# Patient Record
Sex: FEMALE | Race: BLACK OR AFRICAN AMERICAN | Employment: OTHER | URBAN - METROPOLITAN AREA
[De-identification: names, ages, dates, MRNs, and addresses within clinical notes are randomized per-mention and may not be internally consistent; named-entity substitution may affect disease eponyms.]

---

## 2017-02-14 ENCOUNTER — OFFICE VISIT (OUTPATIENT)
Dept: RHEUMATOLOGY | Age: 77
End: 2017-02-14

## 2017-02-14 VITALS
WEIGHT: 157.2 LBS | SYSTOLIC BLOOD PRESSURE: 141 MMHG | DIASTOLIC BLOOD PRESSURE: 88 MMHG | OXYGEN SATURATION: 98 % | HEIGHT: 63 IN | HEART RATE: 69 BPM | TEMPERATURE: 97.7 F | BODY MASS INDEX: 27.85 KG/M2

## 2017-02-14 DIAGNOSIS — I31.39 PERICARDIAL EFFUSION: ICD-10-CM

## 2017-02-14 DIAGNOSIS — Z79.60 LONG-TERM USE OF IMMUNOSUPPRESSANT MEDICATION: ICD-10-CM

## 2017-02-14 DIAGNOSIS — R91.8 PULMONARY NODULES: ICD-10-CM

## 2017-02-14 DIAGNOSIS — M05.9 SEROPOSITIVE RHEUMATOID ARTHRITIS (HCC): Primary | Chronic | ICD-10-CM

## 2017-02-14 DIAGNOSIS — M70.62 TROCHANTERIC BURSITIS OF BOTH HIPS: ICD-10-CM

## 2017-02-14 DIAGNOSIS — M85.80 OSTEOPENIA: ICD-10-CM

## 2017-02-14 DIAGNOSIS — M70.61 TROCHANTERIC BURSITIS OF BOTH HIPS: ICD-10-CM

## 2017-02-14 DIAGNOSIS — R91.1 LUNG NODULE: ICD-10-CM

## 2017-02-14 DIAGNOSIS — M17.0 PRIMARY OSTEOARTHRITIS OF BOTH KNEES: ICD-10-CM

## 2017-02-14 RX ORDER — TRIAMCINOLONE ACETONIDE 40 MG/ML
40 INJECTION, SUSPENSION INTRA-ARTICULAR; INTRAMUSCULAR ONCE
Qty: 1 ML | Refills: 0
Start: 2017-02-14 | End: 2017-02-14

## 2017-02-14 RX ORDER — METHOTREXATE 2.5 MG/1
12.5 TABLET ORAL
Qty: 60 TAB | Refills: 0 | Status: SHIPPED | OUTPATIENT
Start: 2017-02-19 | End: 2017-06-01 | Stop reason: SDUPTHER

## 2017-02-14 RX ORDER — LIDOCAINE HYDROCHLORIDE 10 MG/ML
1 INJECTION, SOLUTION EPIDURAL; INFILTRATION; INTRACAUDAL; PERINEURAL ONCE
Qty: 1 ML | Refills: 0
Start: 2017-02-14 | End: 2017-02-14

## 2017-02-14 NOTE — PATIENT INSTRUCTIONS
Labs today    I injected both your bursas    Please increase your methotrexate to 5 tablets every Sunday.

## 2017-02-14 NOTE — PROGRESS NOTES
REASON FOR VISIT    This is a follow-up visit for Ms. Verlin Koyanagi for Seropositive Rheumatoid Arthritis and Osteopenia. Inflammatory arthritis phenotype includes:  Anti-CCP positive: no  Rheumatoid factor positive: yes (low titer 19.3)  Erosive disease: no  Extra-articular manifestations include: pericardial effusion, pulmonary nodules and GGO    Quantiferon TB: negative  PPD:  Not performed    Therapy History includes:    Current DMARD therapy include: methotrexate 10 mg every Sunday  Prior DMARD therapy include: none  Discontinued DMARDs because of inefficacy: None  Discontinued DMARDs because of side effects: None    Immunizations:   Immunization History   Administered Date(s) Administered    Influenza High Dose Vaccine PF 10/10/2016    Pneumococcal Polysaccharide (PPSV-23) 10/10/2016    Pneumococcal Vaccine (Pcv) 10/31/2007    Tdap 04/21/2014       Active problems include:    Patient Active Problem List   Diagnosis Code    DJD (degenerative joint disease) M19.90    Eczema L30.9    Cyst     Osteopenia M85.80    Hx of mammogram Z92.89    Glucose intolerance (impaired glucose tolerance) R73.02    Lung nodule R91.1    Pericardial effusion I31.3    Seropositive rheumatoid arthritis (HCC) M05.9    Primary osteoarthritis of both hands M19.041, M19.042    Primary osteoarthritis of both knees M17.0    Trochanteric bursitis of both hips M70.61, M70.62    Chronic midline low back pain with right-sided sciatica M54.41, G89.29    Pulmonary nodules R91.8    Abnormal CT scan, chest R93.8    Long-term use of immunosuppressant medication Z79.899       HISTORY OF PRESENT ILLNESS    Ms. Verlin Koyanagi returns for a follow-up visit. On her last visit, I continued methotrexate 10 mg every Sunday and she is tolerating it well. I also injected her knees with good relief. She stumbled and fell since her last visit trying to fix chair. She has has bilateral trochanter pain.  She has morning stiffness in her trochanteric for 45 minutes. She denies pain, swelling, or stiffness in her hands. She has been doing exercise three times per week (MWF) which has helped her feel better. Last blood work was done on 11/16/2016 and did not reveal any significant adverse effects, except for creatinine 0.91 mg/dL, eGFR 72 (previously 1.04 mg/dL, eGFR 62). Most recent inflammatory markers from 11/16/2016 revealed a normal ESR 4 mm/hr (previously 3, 10 mm/hr) and CRP 0.8 mg/L (previously 1.3, 0.7 mg/dL). The patient has not had any interval hospital admissions, infections, or surgeries. REVIEW OF SYSTEMS    A comprehensive review of systems was performed and pertinent results are documented in the HPI, review of systems is otherwise non-contributory. PAST MEDICAL HISTORY    She has a past medical history of Carpal tunnel syndrome; DJD (degenerative joint disease); DJD (degenerative joint disease) of knees (4/9/2013); Eczema; GERD (gastroesophageal reflux disease); Neuropathy; Osteoarthritis; Osteopenia; and Post-menopausal. She also has no past medical history of Difficult intubation; Essential hypertension; Nausea & vomiting; or Unspecified adverse effect of anesthesia. FAMILY HISTORY    Her family history includes Arthritis-osteo in her brother, father, sister, and sister; Elevated Lipids in her sister; Heart Disease (age of onset: 72) in her mother; Hypertension in her brother, maternal aunt, and maternal uncle; Stroke in her maternal aunt and maternal uncle; Thyroid Disease in her sister. SOCIAL HISTORY    She reports that she quit smoking about 2 years ago. Her smoking use included Cigarettes. She smoked 0.10 packs per day. She has never used smokeless tobacco. She reports that she does not drink alcohol or use illicit drugs.     MEDICATIONS    Current Outpatient Prescriptions   Medication Sig Dispense Refill    triamcinolone acetonide (KENALOG) 40 mg/mL injection 1 mL by IntraBURSal route once for 1 dose. 1 mL 0    lidocaine, PF, (XYLOCAINE) 10 mg/mL (1 %) injection 1 mL by IntraBURSal route once for 1 dose. 1 mL 0    triamcinolone acetonide (KENALOG) 40 mg/mL injection 1 mL by IntraBURSal route once for 1 dose. 1 mL 0    lidocaine, PF, (XYLOCAINE) 10 mg/mL (1 %) injection 1 mL by IntraBURSal route once for 1 dose. 1 mL 0    [START ON 2/19/2017] methotrexate (RHEUMATREX) 2.5 mg tablet Take 5 Tabs by mouth every Sunday for 90 days. 60 Tab 0    folic acid (FOLVITE) 1 mg tablet Take  by mouth daily.  ibuprofen (MOTRIN) 400 mg tablet Take  by mouth every six (6) hours as needed for Pain.      B.infantis-B.ani-B.long-B.bifi (PROBIOTIC 4X) 10-15 mg TbEC Take  by mouth.  vitamin E (AQUA GEMS) 400 unit capsule Take  by mouth daily.  methylsulfonylmethane (MSM) 1,000 mg tab Take  by mouth. Indications: Joint Health      OTHER Indications: Calcium and Vitamin D3 1200 mg total      ascorbic acid, vitamin C, (VITAMIN C) 500 mg tablet Take  by mouth. Takes 1 tab daily. ALLERGIES    No Known Allergies    PHYSICAL EXAMINATION    Visit Vitals    /88 (BP 1 Location: Left arm, BP Patient Position: Sitting)    Pulse 69    Temp 97.7 °F (36.5 °C) (Oral)    Ht 5' 3\" (1.6 m)    Wt 157 lb 3.2 oz (71.3 kg)    SpO2 98%    BMI 27.85 kg/m2     Body mass index is 27.85 kg/(m^2). General: Patient is alert, oriented x 3, not in acute distress    HEENT:   Sclerae are not injected and appear moist.  Oral mucous membranes are moist, there are no ulcers present. There is no alopecia. Neck is supple, there is no lymphadenopathy. Cardiovascular:  Heart is regular rate and rhythm, no murmurs. Chest:  Lungs are clear to auscultation bilaterally. No rhonchi, wheezes, or crackles. Abdomen:  Soft, non-tender.     Extremities:  Free of clubbing, cyanosis, edema    Neurological exam:  No focal sensory deficits, muscle strength is full in upper and lower extremities     Skin exam:  There are no rashes, no alopecia, no discoid lesions, no active Raynaud's, no livedo reticularis, no periungual erythema. Musculoskeletal exam:  A comprehensive musculoskeletal exam was performed for all joints of each upper and lower extremity and assessed for swelling, tenderness and range of motion.  Positive results are documented as below:     Bilateral Michael and Heberden nodes  Bilateral knee crepitus without effusion   Bilateral hallux valgus  Hammer toes  Bilateral trochanteric buristis    Joint Count 2/14/2017 11/16/2016 8/12/2016 6/15/2016 5/27/2016   Patient pain (0-100) 0 0 5 10 10   MHAQ 0 0 0 0 0   Left elbow - Tender - - 1 - -   Left elbow - Swollen - - 1 - -   Left wrist- Tender 0 - - - 1   Left wrist- Swollen 1 1 1 1 1   Left 1st MCP - Tender - - - 1 -   Left 1st MCP - Swollen - - 1 1 -   Left 2nd MCP - Tender - - - 1 1   Left 2nd MCP - Swollen - - 1 1 1   Left 3rd MCP - Tender - - - - 1   Left 3rd MCP - Swollen - - - 1 1   Left 5th MCP - Swollen - 1 - - -   Right elbow - Tender - - 1 - -   Right elbow - Swollen - - 1 - -   Right wrist- Tender - - - 1 1   Right wrist- Swollen 1 1 1 1 1   Right 1st MCP - Tender - - 1 1 -   Right 1st MCP - Swollen 1 - 1 1 -   Right 2nd MCP - Tender - - - - 1   Right 2nd MCP - Swollen - 1 - 1 1   Right 3rd MCP - Tender - - 1 - 1   Right 3rd MCP - Swollen - - 1 1 1   Right 5th MCP - Swollen 1 1 - - -   Right 2nd PIP - Tender - - - - 1   Right 2nd PIP - Swollen 1 - - - -   Right 3rd PIP - Tender - - - - 1   Right 4th PIP - Tender - - - - 1   Right 5th PIP - Tender - - - - 1   Tender Joint Count (Total) 0 - - - -   Swollen Joint Count (Total) 5 - - - -   Physician Assessment (0-10) 2 2 2 20 20   Patient Assessment (0-10) 0.5 1 2 25 10   CDAI Total (calculated) 7.5 - - - -     DATA REVIEW    Laboratory     The following laboratory results were reviewed and discussed with the patient:    Office Visit on 11/16/2016   Component Date Value    Color (UA POC) 11/16/2016 Yellow     Clarity (UA POC) 11/16/2016 Clear     Glucose (UA POC) 11/16/2016 Negative     Bilirubin (UA POC) 11/16/2016 Negative     Ketones (UA POC) 11/16/2016 Negative     Specific gravity (UA POC) 11/16/2016 1.025     Blood (UA POC) 11/16/2016 Trace     pH (UA POC) 11/16/2016 6.5     Protein (UA POC) 11/16/2016 Negative     Urobilinogen (UA POC) 11/16/2016 0.2 mg/dL     Nitrites (UA POC) 11/16/2016 Negative     Leukocyte esterase (UA P* 11/16/2016 Negative    Office Visit on 11/16/2016   Component Date Value    WBC 11/16/2016 7.3     RBC 11/16/2016 4.57     HGB 11/16/2016 14.3     HCT 11/16/2016 44.5     MCV 11/16/2016 97     MCH 11/16/2016 31.3     MCHC 11/16/2016 32.1     RDW 11/16/2016 14.7     PLATELET 46/46/7792 868     NEUTROPHILS 11/16/2016 65     Lymphocytes 11/16/2016 24     MONOCYTES 11/16/2016 8     EOSINOPHILS 11/16/2016 2     BASOPHILS 11/16/2016 1     ABS. NEUTROPHILS 11/16/2016 4.7     Abs Lymphocytes 11/16/2016 1.7     ABS. MONOCYTES 11/16/2016 0.6     ABS. EOSINOPHILS 11/16/2016 0.2     ABS. BASOPHILS 11/16/2016 0.0     IMMATURE GRANULOCYTES 11/16/2016 0     ABS. IMM. GRANS. 11/16/2016 0.0     Glucose 11/16/2016 79     BUN 11/16/2016 10     Creatinine 11/16/2016 0.91     GFR est non-AA 11/16/2016 62     GFR est AA 11/16/2016 72     BUN/Creatinine ratio 11/16/2016 11     Sodium 11/16/2016 143     Potassium 11/16/2016 5.0     Chloride 11/16/2016 103     CO2 11/16/2016 27     Calcium 11/16/2016 9.5     Protein, total 11/16/2016 6.6     Albumin 11/16/2016 3.9     GLOBULIN, TOTAL 11/16/2016 2.7     A-G Ratio 11/16/2016 1.4     Bilirubin, total 11/16/2016 0.6     Alk.  phosphatase 11/16/2016 65     AST (SGOT) 11/16/2016 21     ALT (SGPT) 11/16/2016 13     Sed rate (ESR) 11/16/2016 4     C-Reactive Protein, Qt 11/16/2016 0.8      Imaging    Musculoskeletal Ultrasound    Ultrasound of the right wrist. Indication: joint swelling. (5/27/2016)  Using a [employer] with 18 Mhz probe, limited views of the right wrist were obtained. This revealed hypoechoic collection without doppler within the midcarpal joint space. The tendons were normal. Bony contours were regular without erosions seen. There were no soft tissue masses noted. Impression: synovial hypertrophy    Radiographs    Bilateral Hand 5/27/2016: Moderate bilateral triscaphe joint osteoarthritis. Moderate left and mild right first MC joint osteoarthritis. Moderate bilateral first MCP joint osteoarthritis. Multifocal IP joint osteoarthritis is bilateral. There are central erosions in the right first through fourth DIP joints and left third and fifth MP joints. Subtle marginal erosions in the left second DIP joint. No fracture or dislocation on plain film. Alignment is within normal limits. Bone mineralization is decreased. No soft tissue calcification. Bilateral Shoulder 5/27/2016: LEFT: demonstrate mild osteopenia. No acute fracture or dislocation. Age-appropriate AC joint osteoarthritis with marginal osteophytes. Glenohumeral joint is within normal limits for age. No evidence of erosion. RIGHT: demonstrate osteopenia. No acute fracture or dislocation. AC joint osteoarthritis includes marginal osteophytes. Glenohumeral joint osteoarthritis is age-appropriate. Cortical irregularity of the greater tuberosity of the humerus indicates underlying rotator cuff pathology    Left Knee 12/29/2015: no fracture. Joint effusion and prepatellar soft tissue contusion versus bursitis. Increased osteoarthritis. Lumbar Spine 12/29/2015: stable dextroconvex scoliosis without evidence of acute fracture or subluxation. Significant multilevel degenerative disc disease is again noted from L2-S1. The patient is status post laminectomy from L1 through to the sacrum. Multilevel facet degenerative arthritis again noted.     Left Knee 8/03/2010: sclerosis and deformity of the lateral tibial plateau with associated lateral compartment osteoarthritis is suggestive of old injury. No acute fracture is seen. There is moderate to severe patellofemoral     CT Imaging    CT Chest without contrast 7/22/2016: Small lingular and right middle lobe nodules and left lower lobe groundglass opacity all unchanged. This represents a 16 month follow-up examination. The small middle lobe nodule and groundglass opacity in the left lower lobe are essentially unchanged in comparison with 3/9/2011. CT Chest with contrast 1/27/2016: nodules in both lungs including a soft more groundglass appearance nodule in the left lower lobe are stable when compared to the previous examination. No new acute finding or new nodule. Small pericardial effusion. Hiatal hernia. CT Chest without contrast 6/15/2015: stable semisolid nodule anteriorly in left lower lobe. The other nodules are stable. Decreasing pericardial effusion. Stable adenopathy. She underwent a fine needle biopsy which showed abundant benign and reactive bronchial epithelial cells and no malignancy. CT Chest without contrast 5/13/2015: a suspicious semisolid nodule in the anterior segment of the left lower lobe just posterior to the fissure with some retraction of the fissure. This measures 1.2 x 1 x 1.4 cm. PET/CT negative for an 4/10/2015. Moderate stable pericardial effusion. Stable enlarged lymph nodes as described. Several other small pulmonary nodules also stable. PET/CT Scan 4/10/2015: showed the vague semisolid nodule in the left lower lobe demonstrates no increased metabolic activity on PET suggesting benign etiology but is nonspecific and neoplasm is not entirely excluded. There are 2 lymph nodes in the right neck which are normal in size and demonstrate slight increased metabolic activity nonspecific most likely reactive. CT Heart without contrast with calcium 3/12/2015: total calcium score of 0.  A low score suggests a low likelihood of coronary disease but does not exclude the possibility of significant coronary artery narrowing. 3 mm right middle lobe lung nodule unchanged. Pericardial effusion. Hiatal hernia. A CT chest without contrast showed a suspicious semisolid nodule in the anterior segment of the left lower lobe just posterior to the fissure with some retraction of the fissure. This measures 1.2 x 1 x 1.4 cm. This could represent a small bronchogenic carcinoma. Moderate pericardial effusion clinical correlation is recommended. 2 enlarged lymph nodes as described. Several other small pulmonary nodules can be evaluated on followup 23X    CT Heart without contrast with calcium 3/09/2011: total calcium score of 0. A low score suggests a low likelihood of coronary disease but does not exclude the possibility of significant coronary artery narrowing. Small pericardial effusion. 3 mm pulmonary nodule right middle lobe. 7 mm density left lower lobe. MR Imaging    None    DXA    DXA 5/21/2015: lumbar spine L1-L4 T score 1.7 (BMD 1.389 g/cm2), left femoral neck T score: -2.2 (0.728 g/cm2), right femoral neck  T score: -1.4 (0.825 g/cm2), total hip T score: -2.2 (0.728 g/cm2). Echocardiogram    Echocardiogram 6/03/2015: LEFT VENTRICLE: Size was normal. Systolic function was normal. Ejection fraction was estimated in the range of 60 % to 65 %. There were no regional wall motion abnormalities. Wall thickness was mildly increased. DOPPLER: Doppler parameters were consistent with abnormal left ventricular relaxation (grade 1 diastolic dysfunction). RIGHT VENTRICLE: The size was normal. Systolic function was normal. LEFT ATRIUM: Size was normal. LA volume index was 23 ml/m squared. ATRIAL SEPTUM: There was a small atrial septal aneurysm. There was no evidence of shunt on color flow imaging. RIGHT ATRIUM: Size was normal. MITRAL VALVE: Normal valve structure. DOPPLER: There was trivial regurgitation. AORTIC VALVE: Normal valve structure. DOPPLER: Transaortic velocity was within the normal range.  There was no stenosis. There was no significant regurgitation. TRICUSPID VALVE: Normal valve structure. DOPPLER: There was mild regurgitation. Right atrial pressure estimate: 5 mmHg. Pulmonary artery systolic pressure: 25 mmHg. There was no pulmonary hypertension. PULMONIC VALVE: Not well visualized. DOPPLER: The transpulmonic velocity was within the normal range. There was trivial regurgitation. AORTA: The root exhibited normal size. SYSTEMIC VEINS: IVC: The inferior vena cava was normal in size. PERICARDIUM: A small pericardial effusion was identified along the right atrial free wall. There was no evidence of hemodynamic compromise. PATHOLOGY    Fine Need Aspirate of LLL 6/15/2015: Abundant benign and reactive bronchial epithelial cells. No cells diagnostic for malignancy    PROCEDURE     Bilateral Knee Kenalog 40 mg IA (November 16, 2016)      Indications:   Symptom relief from bilateral trochanteric bursitis. (02/14/17)     Procedure:   After consent was obtained, using sterile technique the left trochanteric bursa was prepped using alcohol. Local anesthetic used: Ethyl Chloride. The gluteus was entered and 0 ml's of fluid was withdrawn. Kenalog 40 mg was mixed with 1% lidocaine 1 ml and injected into the trochanteric bursa with a spinal needle withdrawn. The procedure was well tolerated. After consent was obtained, using sterile technique the right trochanteric bursa was prepped using alcohol. Local anesthetic used: Ethyl Chloride. The gluteus was entered and 0 ml's of fluid was withdrawn. Kenalog 40 mg was mixed with 1% lidocaine 1 ml and injected into the trochanteric bursa with a spinal needle withdrawn. The procedure was well tolerated. The patient was asked to continue to rest the bursa for a few more days before resuming regular activities. It may be more painful for the first 1-2 days. Watch for fever, or increased swelling or persistent pain in the joint.  Call or return to clinic prn if such symptoms occur or there is failure to improve as anticipated. ASSESSMENT AND PLAN    This is a follow-up visit for Ms. Dana Connelly. 1) Seropositive Rheumatoid Arthritis. Today, her CDAI 7.5  (previously 8, 16, 15.5, 19), with 0 tender and 5 swollen. She has chronic synovial thickening. She is tolerating methotrexate 10 mg every Sunday. I increased her methotrexate to 12.5 mg every Sunday I ordered labs today and will have her follow up in 4 weeks. 2) Bilateral Hands and Knee Osteoarthritis. I injected both knees last visit with good tolerance and persistent relief. 3) Long Term Use of Immunosuppressants. The patient remains on immunomodulatory medications (methotrexate) and requires frequent toxicity monitoring by blood work. Respective labs were ordered (CBC and CMP). 4) Bilateral Trochanteric Bursitis. This is an active issue today. I injected both bursa with good tolerance. 5) Osteopenia. Her DXA in 5/21/2015 showed lumbar spine L1-L4 T score 1.7 (BMD 1.389 g/cm2), left femoral neck T score: -2.2 (0.728 g/cm2), right femoral neck T score: -1.4 (0.825 g/cm2), total hip T score: -2.2 (0.728 g/cm2). I recommend at least 1200 mg of elemental calcium daily (one 600 mg tablet in the morning and one in the evening) and/or consume dietary calcium in addition to oral to at least 1200 mg. I recommend at least 800 IU of vitamin D daily. 6) Pericardial Effusion. This is chronic. 7) Elevated HCT. Her HCT was 43.8. (previously 48.5%). 8) Chronic Lower Back Pain. She has a spinal cord stimulator. 9) Pulmonary Nodule and Ground Glass Opacities. This has been monitored serially without malignancy seen on tissue sampling. It may be due to #1. I will consider a follow up CT. The patient voiced understanding of the aforementioned assessment and plan. Summary of plan was provided in the After Visit Summary patient instructions.      TODAY'S ORDERS    Orders Placed This Encounter    CBC WITH AUTOMATED DIFF    METABOLIC PANEL, COMPREHENSIVE    C REACTIVE PROTEIN, QT    SED RATE (ESR)    VITAMIN D, 25 HYDROXY    TRIAMCINOLONE ACETONIDE INJ    09739 - DRAIN/INJECT LARGE JOINT/BURSA    TRIAMCINOLONE ACETONIDE INJ    triamcinolone acetonide (KENALOG) 40 mg/mL injection    lidocaine, PF, (XYLOCAINE) 10 mg/mL (1 %) injection    triamcinolone acetonide (KENALOG) 40 mg/mL injection    lidocaine, PF, (XYLOCAINE) 10 mg/mL (1 %) injection    methotrexate (RHEUMATREX) 2.5 mg tablet     Future Appointments  Date Time Provider Franny Alba   3/14/2017 10:00 AM Velma Alfonso MD [address] [facility]   4/24/2017 9:00 AM Jed Peabody, NP PAFP DOREEN Flowers MD, [address] [facility]    Adult Rheumatology   Musculoskeletal Ultrasound Certified  [address] [place] [address]   [id number] [place] [address], [address]   Phone [phone]  Fax [phone]

## 2017-02-14 NOTE — MR AVS SNAPSHOT
Visit Information Date & Time Provider Department Dept. Phone Encounter #  
 2/14/2017 10:00 AM Cely Monique MD [facility] [id number] Follow-up Instructions Return in about 4 weeks (around 3/14/2017). Your Appointments 4/24/2017  9:00 AM  
COMPLETE PHYSICAL with Aftab Son  [facility] ([facility]) Appt Note: CPE  
 [address] [phone]  
[phone]  
  
   
 [address] [address] Upcoming Health Maintenance Date Due  
 MEDICARE YEARLY EXAM 4/22/2017 COLONOSCOPY 2/7/2018 GLAUCOMA SCREENING Q2Y 5/9/2018 DTaP/Tdap/Td series (2 - Td) 4/21/2024 Allergies as of 2/14/2017  Review Complete On: 2/14/2017 By: Cely Monique MD  
 No Known Allergies Current Immunizations  Reviewed on 11/16/2016 Name Date Influenza High Dose Vaccine PF 10/10/2016 Pneumococcal Polysaccharide (PPSV-23) 10/10/2016 Pneumococcal Vaccine (Pcv) 10/31/2007 Tdap 4/21/2014 Not reviewed this visit You Were Diagnosed With   
  
 Codes Comments Seropositive [facility]    -  Primary ICD-10-CM: M05.9 ICD-9-CM: 714.0 Long-term use of immunosuppressant medication     ICD-10-CM: Z79.899 ICD-9-CM: V58.69 Trochanteric bursitis of both hips     ICD-10-CM: M70.61, M70.62 ICD-9-CM: 726.5 Primary osteoarthritis of both knees     ICD-10-CM: M17.0 ICD-9-CM: 715.16 Osteopenia     ICD-10-CM: M85.80 ICD-9-CM: 733.90 Lung nodule     ICD-10-CM: R91.1 ICD-9-CM: 793.11 Vitals BP Pulse Temp Height(growth percentile) Weight(growth percentile) SpO2  
 141/88 (BP 1 Location: Left arm, BP Patient Position: Sitting) 69 97.7 °F (36.5 °C) (Oral) 5' 3\" (1.6 m) 157 lb 3.2 oz (71.3 kg) 98% BMI OB Status Smoking Status 27.85 kg/m2 Hysterectomy Former Smoker BMI and BSA Data  Body Mass Index Body Surface Area  
 27.85 kg/m 2 1.78 m 2 Preferred Pharmacy Pharmacy Name Phone [facility] #[id number]- [place], [address] [facility] [phone] Your Updated Medication List  
  
   
This list is accurate as of: 2/14/17 10:05 AM.  Always use your most recent med list.  
  
  
  
  
 folic acid 1 mg tablet Commonly known as:  Google Take  by mouth daily. ibuprofen 400 mg tablet Commonly known as:  MOTRIN Take  by mouth every six (6) hours as needed for Pain. * lidocaine (PF) 10 mg/mL (1 %) injection Commonly known as:  XYLOCAINE  
1 mL by IntraBURSal route once for 1 dose. * lidocaine (PF) 10 mg/mL (1 %) injection Commonly known as:  XYLOCAINE  
1 mL by IntraBURSal route once for 1 dose. methotrexate 2.5 mg tablet Commonly known as:  Marielos Esquivel Take 5 Tabs by mouth every Sunday for 90 days. Start taking on:  2/19/2017 MSM 1,000 mg Tab Generic drug:  methylsulfonylmethane Take  by mouth. Indications: Joint Health OTHER Indications: Calcium and Vitamin D3 1200 mg total  
  
 PROBIOTIC 4X 10-15 mg Tbec Generic drug:  B.infantis-B.ani-B.long-B.bifi Take  by mouth. * triamcinolone acetonide 40 mg/mL injection Commonly known as:  KENALOG  
1 mL by IntraBURSal route once for 1 dose. * triamcinolone acetonide 40 mg/mL injection Commonly known as:  KENALOG  
1 mL by IntraBURSal route once for 1 dose. VITAMIN C 500 mg tablet Generic drug:  ascorbic acid (vitamin C) Take  by mouth. Takes 1 tab daily. vitamin E 400 unit capsule Commonly known as:  [address] Take  by mouth daily. * Notice: This list has 4 medication(s) that are the same as other medications prescribed for you. Read the directions carefully, and ask your doctor or other care provider to review them with you. Prescriptions Sent to Pharmacy  Refills  
 methotrexate (RHEUMATREX) 2.5 mg tablet 0 Starting on: 2/19/2017 Sig: Take 5 Tabs by mouth every Sunday for 90 days. Class: Normal  
 Pharmacy: [facility]/pharmacy #[id number]- [place], [address] [place] [address] #: [phone] Route: Oral  
  
We Performed the Following C REACTIVE PROTEIN, QT [79239 CPT(R)] CBC WITH AUTOMATED DIFF [22441 CPT(R)] METABOLIC PANEL, COMPREHENSIVE [35189 CPT(R)] WA DRAIN/INJECT LARGE JOINT/BURSA [id number] CPT(R)] SED RATE (ESR) [id number] CPT(R)] TRIAMCINOLONE ACETONIDE INJ [ [facility]] TRIAMCINOLONE ACETONIDE INJ [ [facility]] VITAMIN D, 25 HYDROXY [id number] CPT(R)] Follow-up Instructions Return in about 4 weeks (around 3/14/2017). Patient Instructions Labs today I injected both your bursas Please increase your methotrexate to 5 tablets every Sunday. Introducing [facility]! Mannie Baez introduces The [employer]DEX patient portal. Now you can access parts of your medical record, email your doctor's office, and request medication refills online. 1. In your internet browser, go to [email or web address] 2. Click on the First Time User? Click Here link in the Sign In box. You will see the New Member Sign Up page. 3. Enter your The ADEX Access Code exactly as it appears below. You will not need to use this code after youve completed the sign-up process. If you do not sign up before the expiration date, you must request a new code. · The [employer]DEX Access Code: [email or web address] Expires: 2/14/2017 10:31 AM 
 
4. Enter the last four digits of your Social Security Number (xxxx) and Date of Birth (mm/dd/yyyy) as indicated and click Submit. You will be taken to the next sign-up page. 5. Create a RocketHubt ID. This will be your The ADEX login ID and cannot be changed, so think of one that is secure and easy to remember. 6. Create a RocketHubt password. You can change your password at any time.  
7. Enter your Password Reset Question and Answer. This can be used at a later time if you forget your password. 8. Enter your e-mail address. You will receive e-mail notification when new information is available in [address] 9. Click Sign Up. You can now view and download portions of your medical record. 10. Click the Download Summary menu link to download a portable copy of your medical information. If you have questions, please visit the Frequently Asked Questions section of the CustomMade website. Remember, CustomMade is NOT to be used for urgent needs. For medical emergencies, dial 911. Now available from your iPhone and Android! Please provide this summary of care documentation to your next provider. Your primary care clinician is listed as Brenden Cardenas. If you have any questions after today's visit, please call [phone].

## 2017-03-14 ENCOUNTER — OFFICE VISIT (OUTPATIENT)
Dept: RHEUMATOLOGY | Age: 77
End: 2017-03-14

## 2017-03-14 VITALS
BODY MASS INDEX: 27.64 KG/M2 | WEIGHT: 156 LBS | HEIGHT: 63 IN | TEMPERATURE: 98 F | DIASTOLIC BLOOD PRESSURE: 89 MMHG | RESPIRATION RATE: 18 BRPM | HEART RATE: 69 BPM | SYSTOLIC BLOOD PRESSURE: 150 MMHG | OXYGEN SATURATION: 97 %

## 2017-03-14 DIAGNOSIS — M17.0 PRIMARY OSTEOARTHRITIS OF BOTH KNEES: ICD-10-CM

## 2017-03-14 DIAGNOSIS — R91.8 PULMONARY NODULES: ICD-10-CM

## 2017-03-14 DIAGNOSIS — M05.9 SEROPOSITIVE RHEUMATOID ARTHRITIS (HCC): Primary | Chronic | ICD-10-CM

## 2017-03-14 DIAGNOSIS — M19.041 PRIMARY OSTEOARTHRITIS OF BOTH HANDS: ICD-10-CM

## 2017-03-14 DIAGNOSIS — M70.61 TROCHANTERIC BURSITIS OF BOTH HIPS: ICD-10-CM

## 2017-03-14 DIAGNOSIS — R91.1 LUNG NODULE: ICD-10-CM

## 2017-03-14 DIAGNOSIS — Z79.60 LONG-TERM USE OF IMMUNOSUPPRESSANT MEDICATION: ICD-10-CM

## 2017-03-14 DIAGNOSIS — M54.41 CHRONIC MIDLINE LOW BACK PAIN WITH RIGHT-SIDED SCIATICA: ICD-10-CM

## 2017-03-14 DIAGNOSIS — R93.89 ABNORMAL CT SCAN, CHEST: ICD-10-CM

## 2017-03-14 DIAGNOSIS — M70.62 TROCHANTERIC BURSITIS OF BOTH HIPS: ICD-10-CM

## 2017-03-14 DIAGNOSIS — M19.042 PRIMARY OSTEOARTHRITIS OF BOTH HANDS: ICD-10-CM

## 2017-03-14 DIAGNOSIS — I31.39 PERICARDIAL EFFUSION: ICD-10-CM

## 2017-03-14 DIAGNOSIS — G89.29 CHRONIC MIDLINE LOW BACK PAIN WITH RIGHT-SIDED SCIATICA: ICD-10-CM

## 2017-03-14 DIAGNOSIS — M85.80 OSTEOPENIA: ICD-10-CM

## 2017-03-14 NOTE — PROGRESS NOTES
REASON FOR VISIT    This is a follow-up visit for Ms. Marta Solorio for Seropositive Rheumatoid Arthritis and Osteopenia. Inflammatory arthritis phenotype includes:  Anti-CCP positive: no  Rheumatoid factor positive: yes (low titer 19.3)  Erosive disease: no  Extra-articular manifestations include: pericardial effusion, pulmonary nodules and GGO    Quantiferon TB: negative  PPD:  Not performed    Therapy History includes:    Current DMARD therapy include: methotrexate 12.5 mg every Sunday  Prior DMARD therapy include: none  Discontinued DMARDs because of inefficacy: None  Discontinued DMARDs because of side effects: None    Immunizations:   Immunization History   Administered Date(s) Administered    Influenza High Dose Vaccine PF 10/10/2016    Pneumococcal Polysaccharide (PPSV-23) 10/10/2016    Pneumococcal Vaccine (Pcv) 10/31/2007    Tdap 04/21/2014       Active problems include:    Patient Active Problem List   Diagnosis Code    DJD (degenerative joint disease) M19.90    Cyst     Osteopenia M85.80    Hx of mammogram Z92.89    Glucose intolerance (impaired glucose tolerance) R73.02    Pericardial effusion I31.3    Seropositive rheumatoid arthritis (HCC) M05.9    Primary osteoarthritis of both hands M19.041, M19.042    Primary osteoarthritis of both knees M17.0    Trochanteric bursitis of both hips M70.61, M70.62    Chronic midline low back pain with right-sided sciatica M54.41, G89.29    Pulmonary nodules R91.8    Abnormal CT scan, chest R93.8    Long-term use of immunosuppressant medication Z79.899       HISTORY OF PRESENT ILLNESS    Ms. Marta Solorio returns for a follow-up visit. On her last visit, I increased her methotrexate to 12.5 mg every Sunday and she is tolerating it well. She wants to get off therapy. Today, she denies pain, swelling, or stiffness. She denies chest discomfort. She continues to exercise regularly.      Last blood work was done on 11/16/2016 and did not reveal any significant adverse effects, except for creatinine 0.91 mg/dL, eGFR 72 (previously 1.04 mg/dL, eGFR 62). Most recent inflammatory markers from 11/16/2016 revealed a normal ESR 4 mm/hr (previously 3, 10 mm/hr) and CRP 0.8 mg/L (previously 1.3, 0.7 mg/dL). The patient has not had any interval hospital admissions, infections, or surgeries. REVIEW OF SYSTEMS    A comprehensive review of systems was performed and pertinent results are documented in the HPI, review of systems is otherwise non-contributory. PAST MEDICAL HISTORY    She has a past medical history of Carpal tunnel syndrome; DJD (degenerative joint disease); DJD (degenerative joint disease) of knees (4/9/2013); Eczema; GERD (gastroesophageal reflux disease); Neuropathy; Osteoarthritis; Osteopenia; and Post-menopausal. She also has no past medical history of Difficult intubation; Essential hypertension; Nausea & vomiting; or Unspecified adverse effect of anesthesia. FAMILY HISTORY    Her family history includes Arthritis-osteo in her brother, father, sister, and sister; Elevated Lipids in her sister; Heart Disease (age of onset: 72) in her mother; Hypertension in her brother, maternal aunt, and maternal uncle; Stroke in her maternal aunt and maternal uncle; Thyroid Disease in her sister. SOCIAL HISTORY    She reports that she quit smoking about 2 years ago. Her smoking use included Cigarettes. She smoked 0.10 packs per day. She has never used smokeless tobacco. She reports that she does not drink alcohol or use illicit drugs. MEDICATIONS    Current Outpatient Prescriptions   Medication Sig Dispense Refill    methotrexate (RHEUMATREX) 2.5 mg tablet Take 5 Tabs by mouth every Sunday for 90 days. 60 Tab 0    folic acid (FOLVITE) 1 mg tablet Take  by mouth daily.  ibuprofen (MOTRIN) 400 mg tablet Take  by mouth every six (6) hours as needed for Pain.      B.infantis-B.ani-B.long-B.bifi (PROBIOTIC 4X) 10-15 mg TbEC Take  by mouth.       vitamin E (AQUA GEMS) 400 unit capsule Take  by mouth daily.  methylsulfonylmethane (MSM) 1,000 mg tab Take  by mouth. Indications: Joint Health      OTHER Indications: Calcium and Vitamin D3 1200 mg total      ascorbic acid, vitamin C, (VITAMIN C) 500 mg tablet Take  by mouth. Takes 1 tab daily. ALLERGIES    No Known Allergies    PHYSICAL EXAMINATION    Visit Vitals    /89 (BP 1 Location: Right arm, BP Patient Position: Sitting)    Pulse 69    Temp 98 °F (36.7 °C)    Resp 18    Ht 5' 3\" (1.6 m)    Wt 156 lb (70.8 kg)    SpO2 97%    BMI 27.63 kg/m2     Body mass index is 27.63 kg/(m^2). General: Patient is alert, oriented x 3, not in acute distress    HEENT:   Sclerae are not injected and appear moist.  Oral mucous membranes are moist, there are no ulcers present. There is no alopecia. Neck is supple     Cardiovascular:  Heart is regular rate and rhythm, no murmurs. Chest:  Lungs are clear to auscultation bilaterally. No rhonchi, wheezes, or crackles. Extremities:  Free of clubbing, cyanosis, edema    Neurological exam:  No focal sensory deficits, muscle strength is full in upper and lower extremities     Skin exam:  There are no rashes, no alopecia, no discoid lesions, no active Raynaud's, no livedo reticularis, no periungual erythema. Musculoskeletal exam:  A comprehensive musculoskeletal exam was performed for all joints of each upper and lower extremity and assessed for swelling, tenderness and range of motion.  Positive results are documented as below:     Bilateral Michael and Heberden nodes  Bilateral knee crepitus without effusion   Bilateral hallux valgus  Hammer toes  Bilateral trochanteric buristis    Joint Count 3/14/2017 2/14/2017 11/16/2016 8/12/2016 6/15/2016 5/27/2016   Patient pain (0-100) 0 0 0 5 10 10   MHAQ 0 0 0 0 0 0   Left elbow - Tender - - - 1 - -   Left elbow - Swollen - - - 1 - -   Left wrist- Tender 0 0 - - - 1   Left wrist- Swollen 1 1 1 1 1 1 Left 1st MCP - Tender - - - - 1 -   Left 1st MCP - Swollen - - - 1 1 -   Left 2nd MCP - Tender - - - - 1 1   Left 2nd MCP - Swollen - - - 1 1 1   Left 3rd MCP - Tender - - - - - 1   Left 3rd MCP - Swollen - - - - 1 1   Left 5th MCP - Swollen - - 1 - - -   Left 3rd PIP - Swollen 1 - - - - -   Right elbow - Tender - - - 1 - -   Right elbow - Swollen - - - 1 - -   Right wrist- Tender - - - - 1 1   Right wrist- Swollen - 1 1 1 1 1   Right 1st MCP - Tender - - - 1 1 -   Right 1st MCP - Swollen - 1 - 1 1 -   Right 2nd MCP - Tender - - - - - 1   Right 2nd MCP - Swollen - - 1 - 1 1   Right 3rd MCP - Tender - - - 1 - 1   Right 3rd MCP - Swollen - - - 1 1 1   Right 5th MCP - Swollen - 1 1 - - -   Right 2nd PIP - Tender - - - - - 1   Right 2nd PIP - Swollen - 1 - - - -   Right 3rd PIP - Tender - - - - - 1   Right 3rd PIP - Swollen 1 - - - - -   Right 4th PIP - Tender - - - - - 1   Right 5th PIP - Tender - - - - - 1   Tender Joint Count (Total) 0 0 - - - -   Swollen Joint Count (Total) 3 5 - - - -   Physician Assessment (0-10) 1 2 2 2 20 20   Patient Assessment (0-10) 0.5 0.5 1 2 25 10   CDAI Total (calculated) 4.5 7.5 - - - -     DATA REVIEW    Laboratory     The following laboratory results were reviewed and discussed with the patient:    No visits with results within 16 Week(s) from this visit.   Latest known visit with results is:    Office Visit on 11/16/2016   Component Date Value    Color (UA POC) 11/16/2016 Yellow     Clarity (UA POC) 11/16/2016 Clear     Glucose (UA POC) 11/16/2016 Negative     Bilirubin (UA POC) 11/16/2016 Negative     Ketones (UA POC) 11/16/2016 Negative     Specific gravity (UA POC) 11/16/2016 1.025     Blood (UA POC) 11/16/2016 Trace     pH (UA POC) 11/16/2016 6.5     Protein (UA POC) 11/16/2016 Negative     Urobilinogen (UA POC) 11/16/2016 0.2 mg/dL     Nitrites (UA POC) 11/16/2016 Negative     Leukocyte esterase (UA P* 11/16/2016 Negative      Imaging    Musculoskeletal Ultrasound    Ultrasound of the right wrist. Indication: joint swelling. (5/27/2016)  Using a One Codexiq e with 18 Mhz probe, limited views of the right wrist were obtained. This revealed hypoechoic collection without doppler within the midcarpal joint space. The tendons were normal. Bony contours were regular without erosions seen. There were no soft tissue masses noted. Impression: synovial hypertrophy    Radiographs    Bilateral Hand 5/27/2016: Moderate bilateral triscaphe joint osteoarthritis. Moderate left and mild right first MC joint osteoarthritis. Moderate bilateral first MCP joint osteoarthritis. Multifocal IP joint osteoarthritis is bilateral. There are central erosions in the right first through fourth DIP joints and left third and fifth MP joints. Subtle marginal erosions in the left second DIP joint. No fracture or dislocation on plain film. Alignment is within normal limits. Bone mineralization is decreased. No soft tissue calcification. Bilateral Shoulder 5/27/2016: LEFT: demonstrate mild osteopenia. No acute fracture or dislocation. Age-appropriate AC joint osteoarthritis with marginal osteophytes. Glenohumeral joint is within normal limits for age. No evidence of erosion. RIGHT: demonstrate osteopenia. No acute fracture or dislocation. AC joint osteoarthritis includes marginal osteophytes. Glenohumeral joint osteoarthritis is age-appropriate. Cortical irregularity of the greater tuberosity of the humerus indicates underlying rotator cuff pathology    Left Knee 12/29/2015: no fracture. Joint effusion and prepatellar soft tissue contusion versus bursitis. Increased osteoarthritis. Lumbar Spine 12/29/2015: stable dextroconvex scoliosis without evidence of acute fracture or subluxation. Significant multilevel degenerative disc disease is again noted from L2-S1. The patient is status post laminectomy from L1 through to the sacrum. Multilevel facet degenerative arthritis again noted.     Left Knee 8/03/2010: sclerosis and deformity of the lateral tibial plateau with associated lateral compartment osteoarthritis is suggestive of old injury. No acute fracture is seen. There is moderate to severe patellofemoral     CT Imaging    CT Chest without contrast 7/22/2016: Small lingular and right middle lobe nodules and left lower lobe groundglass opacity all unchanged. This represents a 16 month follow-up examination. The small middle lobe nodule and groundglass opacity in the left lower lobe are essentially unchanged in comparison with 3/9/2011. CT Chest with contrast 1/27/2016: nodules in both lungs including a soft more groundglass appearance nodule in the left lower lobe are stable when compared to the previous examination. No new acute finding or new nodule. Small pericardial effusion. Hiatal hernia. CT Chest without contrast 6/15/2015: stable semisolid nodule anteriorly in left lower lobe. The other nodules are stable. Decreasing pericardial effusion. Stable adenopathy. She underwent a fine needle biopsy which showed abundant benign and reactive bronchial epithelial cells and no malignancy. CT Chest without contrast 5/13/2015: a suspicious semisolid nodule in the anterior segment of the left lower lobe just posterior to the fissure with some retraction of the fissure. This measures 1.2 x 1 x 1.4 cm. PET/CT negative for an 4/10/2015. Moderate stable pericardial effusion. Stable enlarged lymph nodes as described. Several other small pulmonary nodules also stable. PET/CT Scan 4/10/2015: showed the vague semisolid nodule in the left lower lobe demonstrates no increased metabolic activity on PET suggesting benign etiology but is nonspecific and neoplasm is not entirely excluded. There are 2 lymph nodes in the right neck which are normal in size and demonstrate slight increased metabolic activity nonspecific most likely reactive.     CT Heart without contrast with calcium 3/12/2015: total calcium score of 0. A low score suggests a low likelihood of coronary disease but does not exclude the possibility of significant coronary artery narrowing. 3 mm right middle lobe lung nodule unchanged. Pericardial effusion. Hiatal hernia. A CT chest without contrast showed a suspicious semisolid nodule in the anterior segment of the left lower lobe just posterior to the fissure with some retraction of the fissure. This measures 1.2 x 1 x 1.4 cm. This could represent a small bronchogenic carcinoma. Moderate pericardial effusion clinical correlation is recommended. 2 enlarged lymph nodes as described. Several other small pulmonary nodules can be evaluated on followup 23X    CT Heart without contrast with calcium 3/09/2011: total calcium score of 0. A low score suggests a low likelihood of coronary disease but does not exclude the possibility of significant coronary artery narrowing. Small pericardial effusion. 3 mm pulmonary nodule right middle lobe. 7 mm density left lower lobe. MR Imaging    None    DXA    DXA 5/21/2015: lumbar spine L1-L4 T score 1.7 (BMD 1.389 g/cm2), left femoral neck T score: -2.2 (0.728 g/cm2), right femoral neck  T score: -1.4 (0.825 g/cm2), total hip T score: -2.2 (0.728 g/cm2). Echocardiogram    Echocardiogram 6/03/2015: LEFT VENTRICLE: Size was normal. Systolic function was normal. Ejection fraction was estimated in the range of 60 % to 65 %. There were no regional wall motion abnormalities. Wall thickness was mildly increased. DOPPLER: Doppler parameters were consistent with abnormal left ventricular relaxation (grade 1 diastolic dysfunction). RIGHT VENTRICLE: The size was normal. Systolic function was normal. LEFT ATRIUM: Size was normal. LA volume index was 23 ml/m squared. ATRIAL SEPTUM: There was a small atrial septal aneurysm. There was no evidence of shunt on color flow imaging. RIGHT ATRIUM: Size was normal. MITRAL VALVE: Normal valve structure. DOPPLER: There was trivial regurgitation. AORTIC VALVE: Normal valve structure. DOPPLER: Transaortic velocity was within the normal range. There was no stenosis. There was no significant regurgitation. TRICUSPID VALVE: Normal valve structure. DOPPLER: There was mild regurgitation. Right atrial pressure estimate: 5 mmHg. Pulmonary artery systolic pressure: 25 mmHg. There was no pulmonary hypertension. PULMONIC VALVE: Not well visualized. DOPPLER: The transpulmonic velocity was within the normal range. There was trivial regurgitation. AORTA: The root exhibited normal size. SYSTEMIC VEINS: IVC: The inferior vena cava was normal in size. PERICARDIUM: A small pericardial effusion was identified along the right atrial free wall. There was no evidence of hemodynamic compromise. PATHOLOGY    Fine Need Aspirate of LLL 6/15/2015: Abundant benign and reactive bronchial epithelial cells. No cells diagnostic for malignancy    PROCEDURE     Bilateral trochanteric bursitis Kenalog 40 mg. (02/14/17)     Bilateral Knee Kenalog 40 mg. (11/16/2016)    ASSESSMENT AND PLAN    This is a follow-up visit for Ms. Catie Baez. 1) Seropositive Rheumatoid Arthritis. Today, her CDAI 4.5  (previously 7.5, 8, 16, 15.5, 19), with 0 tender and 3 swollen. She is tolerating methotrexate 12.5 mg every Sunday. I discussed with her about treatment plans and if she wants to discontinue, what she may experience. I ordered labs today and will have her follow up in 3 months. 2) Bilateral Hands and Knee Osteoarthritis. I injected both knees last visit with good tolerance and persistent relief. 3) Long Term Use of Immunosuppressants. The patient remains on immunomodulatory medications (methotrexate) and requires frequent toxicity monitoring by blood work. Respective labs were ordered (CBC and CMP). 4) Bilateral Trochanteric Bursitis. This is an active issue today. I injected both bursa with good tolerance. 5) Osteopenia.  Her DXA in 5/21/2015 showed lumbar spine L1-L4 T score 1.7 (BMD 1.389 g/cm2), left femoral neck T score: -2.2 (0.728 g/cm2), right femoral neck T score: -1.4 (0.825 g/cm2), total hip T score: -2.2 (0.728 g/cm2). 6) Pericardial Effusion. This is chronic and asymptomatic. 7) Pulmonary Nodule and Ground Glass Opacities. This has been monitored serially without malignancy seen on tissue sampling. It may be due to #1. I will order a CT on follow up. 8) Chronic Lower Back Pain. She has a spinal cord stimulator. This was not an active issue today. I performed the history and physical examination of the patient and discussed the management with the nurse practioner fellow and then with the patient. The patient voiced understanding of the aforementioned assessment and plan. Summary of plan was provided in the After Visit Summary patient instructions.      TODAY'S ORDERS    Orders Placed This Encounter    CBC WITH AUTOMATED DIFF    METABOLIC PANEL, COMPREHENSIVE    C REACTIVE PROTEIN, QT    SED RATE (ESR)     Future Appointments  Date Time Provider Franny Willis   4/24/2017 9:00 AM Rose Jolley NP [address], [place], [address] [facility]    Adult Rheumatology   Musculoskeletal Ultrasound Certified  [address] [place], [place] [address]chalino Rivera   [address] [facility], [address] [place] [address]   Phone [phone]  Fax [phone]

## 2017-04-24 ENCOUNTER — OFFICE VISIT (OUTPATIENT)
Dept: FAMILY MEDICINE CLINIC | Age: 77
End: 2017-04-24

## 2017-04-24 VITALS
HEART RATE: 61 BPM | OXYGEN SATURATION: 98 % | TEMPERATURE: 97.9 F | DIASTOLIC BLOOD PRESSURE: 94 MMHG | RESPIRATION RATE: 18 BRPM | SYSTOLIC BLOOD PRESSURE: 150 MMHG | BODY MASS INDEX: 28.7 KG/M2 | HEIGHT: 63 IN | WEIGHT: 162 LBS

## 2017-04-24 DIAGNOSIS — Z00.00 ENCOUNTER FOR INITIAL PREVENTIVE PHYSICAL EXAMINATION COVERED BY MEDICARE: Primary | ICD-10-CM

## 2017-04-24 DIAGNOSIS — R10.13 EPIGASTRIC PAIN: ICD-10-CM

## 2017-04-24 DIAGNOSIS — Z12.31 SCREENING MAMMOGRAM, ENCOUNTER FOR: ICD-10-CM

## 2017-04-24 DIAGNOSIS — M79.605 PAIN OF LEFT LOWER EXTREMITY: ICD-10-CM

## 2017-04-24 DIAGNOSIS — K21.9 GASTROESOPHAGEAL REFLUX DISEASE WITHOUT ESOPHAGITIS: ICD-10-CM

## 2017-04-24 RX ORDER — OMEPRAZOLE 40 MG/1
40 CAPSULE, DELAYED RELEASE ORAL DAILY
Qty: 30 CAP | Refills: 0 | Status: SHIPPED | OUTPATIENT
Start: 2017-04-24 | End: 2017-05-21 | Stop reason: SDUPTHER

## 2017-04-24 RX ORDER — CLOBETASOL PROPIONATE 0.5 MG/G
OINTMENT TOPICAL
Refills: 1 | COMMUNITY
Start: 2017-02-04 | End: 2017-11-14

## 2017-04-24 NOTE — PROGRESS NOTES
Paz Lamb is a 78 y.o. female and presents for annual Medicare Wellness Visit. Problem List: Reviewed with patient and discussed risk factors. Patient Active Problem List   Diagnosis Code    DJD (degenerative joint disease) M19.90    Cyst     Osteopenia M85.80    Hx of mammogram Z92.89    Glucose intolerance (impaired glucose tolerance) R73.02    Pericardial effusion I31.3    Seropositive rheumatoid arthritis (HCC) M05.9    Primary osteoarthritis of both hands M19.041, M19.042    Primary osteoarthritis of both knees M17.0    Trochanteric bursitis of both hips M70.61, M70.62    Chronic midline low back pain with right-sided sciatica M54.41, G89.29    Pulmonary nodules R91.8    Abnormal CT scan, chest R93.8    Long-term use of immunosuppressant medication Z79.899       Current medical providers:  Patient Care Team:  Ainsley Blackwood DO as PCP - General (Family Practice)  River Goss MD (Cardiology)  Air Russo MD (Orthopedic Surgery)  Mamta Rajan MD (Ophthalmology)    PSH: Reviewed with patient  Past Surgical History:   Procedure Laterality Date    ENDOSCOPY, COLON, DIAGNOSTIC  2007    -repeat in 5 years    HX CATARACT REMOVAL Right 10/2015    HX HYSTERECTOMY  1970's    w/partial ovariectomy r/t endometriosis by Dr. Stockton Blood  12/2003    bone stimulator Dr. Chichi Moon at [facility]  10/2004        SH: Reviewed with patient  Social History   Substance Use Topics    Smoking status: Former Smoker     Packs/day: 0.10     Types: Cigarettes     Quit date: 4/1/2014    Smokeless tobacco: Never Used      Comment: 40 years of .5 pack/day prior to quit 1/2012, relapsed.     Alcohol use No       FH: Reviewed with patient  Family History   Problem Relation Age of Onset    Heart Disease Mother 72     PTCA    Elevated Lipids Sister     Hypertension Brother     Arthritis-osteo Brother     Hypertension Maternal Aunt     Stroke Maternal Aunt     Stroke Maternal Uncle     Hypertension Maternal Uncle     Arthritis-osteo Sister     Arthritis-osteo Sister     Thyroid Disease Sister     Arthritis-osteo Father      knees       Medications/Allergies: Reviewed with patient  Current Outpatient Prescriptions on File Prior to Visit   Medication Sig Dispense Refill    methotrexate (RHEUMATREX) 2.5 mg tablet Take 5 Tabs by mouth every Sunday for 90 days. 60 Tab 0    folic acid (FOLVITE) 1 mg tablet Take  by mouth daily.  ibuprofen (MOTRIN) 400 mg tablet Take  by mouth every six (6) hours as needed for Pain.      B.infantis-B.ani-B.long-B.bifi (PROBIOTIC 4X) 10-15 mg TbEC Take  by mouth.  vitamin E (AQUA GEMS) 400 unit capsule Take  by mouth daily.  methylsulfonylmethane (MSM) 1,000 mg tab Take  by mouth. Indications: Joint Health      OTHER Indications: Calcium and Vitamin D3 1200 mg total      ascorbic acid, vitamin C, (VITAMIN C) 500 mg tablet Take  by mouth. Takes 1 tab daily. No current facility-administered medications on file prior to visit. No Known Allergies    Objective:  Visit Vitals    BP (!) 150/94 (BP 1 Location: Left arm, BP Patient Position: Sitting)    Pulse 61    Temp 97.9 °F (36.6 °C) (Oral)    Resp 18    Ht 5' 3\" (1.6 m)    Wt 162 lb (73.5 kg)    SpO2 98%    BMI 28.7 kg/m2    Body mass index is 28.7 kg/(m^2). Assessment of cognitive impairment: Alert and oriented x 3    Depression Screen:   PHQ 2 / 9, over the last two weeks 4/24/2017   Little interest or pleasure in doing things Not at all   Feeling down, depressed or hopeless Not at all   Total Score PHQ 2 0       Fall Risk Assessment:    Fall Risk Assessment, last 12 mths 4/24/2017   Able to walk? Yes   Fall in past 12 months? No       Functional Ability:   Does the patient exhibit a steady gait? yes   How long did it take the patient to get up and walk from a sitting position?  One second   Is the patient self reliant?  (ie can do own laundry, meals, household chores)  yes     Does the patient handle his/her own medications? yes     Does the patient handle his/her own money? yes     Is the patients home safe (ie good lighting, handrails on stairs and bath, etc.)? yes     Did you notice or did patient express any hearing difficulties? no     Did you notice or did patient express any vision difficulties?   no     Were distance and reading eye charts used? no       Advance Care Planning:   Patient was offered the opportunity to discuss advance care planning:  yes     Does patient have an Advance Directive:  yes   If no, did you provide information on Caring Connections? Bring in the copy       Plan:      Orders Placed This Encounter    CBC W/O DIFF    METABOLIC PANEL, COMPREHENSIVE    LIPID PANEL    AMB POC EKG ROUTINE W/ 12 LEADS, INTER & REP    clobetasol (TEMOVATE) 0.05 % ointment       Health Maintenance   Topic Date Due    MEDICARE YEARLY EXAM  04/22/2017    COLONOSCOPY  02/07/2018    GLAUCOMA SCREENING Q2Y  05/09/2018    DTaP/Tdap/Td series (2 - Td) 04/21/2024    OSTEOPOROSIS SCREENING (DEXA)  Completed    ZOSTER VACCINE AGE 60>  Completed    Pneumococcal 65+ Low/Medium Risk  Completed    INFLUENZA AGE 9 TO ADULT  Addressed       *Patient verbalized understanding and agreement with the plan. A copy of the After Visit Summary with personalized health plan was given to the patient today.

## 2017-04-24 NOTE — PROGRESS NOTES
\"Reviewed record in preparation for visit and have obtained the necessary documentation\"  Chief Complaint   Patient presents with    Annual Wellness Visit    Leg Pain     color change in skin of left side and swelling     Chest Pain     cramping, chronic           Patient presents in the office today for an annual wellness visit     Patient also has complaints of leg pain,swelling,and change in color to skin of left leg and ankle swelling    Patient also has complaints of cramping in chest area chronically, relieved with belching     1. Have you been to the ER, urgent care clinic since your last visit? Hospitalized since your last visit? No    2. Have you seen or consulted any other health care providers outside of the [address] [place] [address] since your last visit? Include any pap smears or colon screening. No     PHQ 2 / 9, over the last two weeks 4/24/2017   Little interest or pleasure in doing things Not at all   Feeling down, depressed or hopeless Not at all   Total Score PHQ 2 0     Fall Risk Assessment, last 12 mths 4/24/2017   Able to walk? Yes   Fall in past 12 months?  No Lymphoma, large-cell, diffuse

## 2017-04-24 NOTE — PROGRESS NOTES
HISTORY OF PRESENT ILLNESS  Fifi Jain is a 78 y.o. female. HPI: Patient comes in for medicare physical also reports having intermittent epigastric pain for a year. Pain usually occurs after eating, lasts for 10-15  minutes, it feels like spasm, gets better after taking Pepcid and burping. She reports she saw DR Kwaku Hoffman in the past for similar problem and and was told her heart is fine. Denies heart palpitation, dizziness cough or chest congestion. She also reports pain in left ankle and has skin discoloration in both knee. She has arthritis and sees rheumatologist for joint pain. Has appointment next month.     Past Medical History:   Diagnosis Date    Carpal tunnel syndrome     DJD (degenerative joint disease)     hip/spine lumbar spine with spinal stenosis    DJD (degenerative joint disease) of knees 4/9/2013    Ortho, [facility] Dr Orlando Chew GERD (gastroesophageal reflux disease)     Neuropathy     R LE with radiculopaty    Osteoarthritis     both knees and shoulder, hands     Osteopenia     hip    Post-menopausal      Past Medical History:   Diagnosis Date    Carpal tunnel syndrome     DJD (degenerative joint disease)     hip/spine lumbar spine with spinal stenosis    DJD (degenerative joint disease) of knees 4/9/2013    Ortho, [facility] Dr Orlando Chew GERD (gastroesophageal reflux disease)     Neuropathy     R LE with radiculopaty    Osteoarthritis     both knees and shoulder, hands     Osteopenia     hip    Post-menopausal      Past Surgical History:   Procedure Laterality Date    ENDOSCOPY, COLON, DIAGNOSTIC  2007    -repeat in 5 years    HX CATARACT REMOVAL Right 10/2015    HX HYSTERECTOMY  1970's    w/partial ovariectomy r/t endometriosis by Dr. aWylon Shanks  12/2003    bone stimulator Dr. Krishna Mtz at [address]  10/2004   No Known Allergies    Current Outpatient Prescriptions:     clobetasol (TEMOVATE) 0.05 % ointment, APPLY TO AFFECTED AREA ON SCALP TWICE A DAY AS NEEDED, Disp: , Rfl: 1    omeprazole (PRILOSEC) 40 mg capsule, Take 1 Cap by mouth daily. , Disp: 30 Cap, Rfl: 0    methotrexate (RHEUMATREX) 2.5 mg tablet, Take 5 Tabs by mouth every Sunday for 90 days. , Disp: 60 Tab, Rfl: 0    folic acid (FOLVITE) 1 mg tablet, Take  by mouth daily. , Disp: , Rfl:     ibuprofen (MOTRIN) 400 mg tablet, Take  by mouth every six (6) hours as needed for Pain., Disp: , Rfl:     B.infantis-B.ani-B.long-B.bifi (PROBIOTIC 4X) 10-15 mg TbEC, Take  by mouth., Disp: , Rfl:     vitamin E (AQUA GEMS) 400 unit capsule, Take  by mouth daily. , Disp: , Rfl:     methylsulfonylmethane (MSM) 1,000 mg tab, Take  by mouth. Indications: Joint Health, Disp: , Rfl:     OTHER, Indications: Calcium and Vitamin D3 1200 mg total, Disp: , Rfl:     ascorbic acid, vitamin C, (VITAMIN C) 500 mg tablet, Take  by mouth. Takes 1 tab daily. , Disp: , Rfl:   Review of Systems   Constitutional: Negative. Respiratory: Negative. Cardiovascular: Positive for chest pain. Gastrointestinal: Negative. Genitourinary: Negative. Musculoskeletal: Positive for joint pain. Neurological: Negative. Blood pressure (!) 150/94, pulse 61, temperature 97.9 °F (36.6 °C), temperature source Oral, resp. rate 18, height 5' 3\" (1.6 m), weight 162 lb (73.5 kg), SpO2 98 %. Physical Exam   Constitutional: She is oriented to person, place, and time. No distress. HENT:   Mouth/Throat: Oropharynx is clear and moist.   Neck: Neck supple. Cardiovascular: Normal rate and regular rhythm. No murmur heard. EKG, normal   Double check /90   Pulmonary/Chest: Effort normal and breath sounds normal.   Abdominal: Soft. Bowel sounds are normal.   Musculoskeletal: Normal range of motion. She exhibits tenderness. Left ankle pain, no swelling noted, NROM   Neurological: She is alert and oriented to person, place, and time. Skin: Skin is warm and dry.    Nursing note and vitals reviewed. ASSESSMENT and PLAN    ICD-10-CM ICD-9-CM    1. Encounter for initial preventive physical examination covered by Medicare Z00.00 V70.0 AMB POC EKG ROUTINE W/ 12 LEADS, INTER & REP   2. Epigastric pain R10.13 789.06 CBC W/O DIFF      METABOLIC PANEL, COMPREHENSIVE      LIPID PANEL   3. Gastroesophageal reflux disease without esophagitis K21.9 530.81 omeprazole (PRILOSEC) 40 mg capsule   4. Screening mammogram, encounter for Z12.31 V76.12 JESE MAMMOGRAM DIAG BILAT SAME DAY INCL CAD   5.  Pain of left lower extremity M79.605 729.5    start on Prilosec 40 mg to take one 30 minutes before breakfast  Follow up in two weeks,   Go to ER if pain didn't resolve  Pt was given an after visit summary which includes diagnosis, current medicines and vital and voiced understanding of treatment plan

## 2017-04-25 LAB
ALBUMIN SERPL-MCNC: 3.9 G/DL (ref 3.5–4.8)
ALBUMIN/GLOB SERPL: 1.5 {RATIO} (ref 1.2–2.2)
ALP SERPL-CCNC: 60 IU/L (ref 39–117)
ALT SERPL-CCNC: 28 IU/L (ref 0–32)
AST SERPL-CCNC: 27 IU/L (ref 0–40)
BILIRUB SERPL-MCNC: 0.8 MG/DL (ref 0–1.2)
BUN SERPL-MCNC: 12 MG/DL (ref 8–27)
BUN/CREAT SERPL: 14 (ref 12–28)
CALCIUM SERPL-MCNC: 9.3 MG/DL (ref 8.7–10.3)
CHLORIDE SERPL-SCNC: 100 MMOL/L (ref 96–106)
CHOLEST SERPL-MCNC: 171 MG/DL (ref 100–199)
CO2 SERPL-SCNC: 26 MMOL/L (ref 18–29)
CREAT SERPL-MCNC: 0.88 MG/DL (ref 0.57–1)
ERYTHROCYTE [DISTWIDTH] IN BLOOD BY AUTOMATED COUNT: 15.2 % (ref 12.3–15.4)
GLOBULIN SER CALC-MCNC: 2.6 G/DL (ref 1.5–4.5)
GLUCOSE SERPL-MCNC: 86 MG/DL (ref 65–99)
HCT VFR BLD AUTO: 44.7 % (ref 34–46.6)
HDLC SERPL-MCNC: 85 MG/DL
HGB BLD-MCNC: 14.7 G/DL (ref 11.1–15.9)
INTERPRETATION, 910389: NORMAL
LDLC SERPL CALC-MCNC: 70 MG/DL (ref 0–99)
MCH RBC QN AUTO: 32.2 PG (ref 26.6–33)
MCHC RBC AUTO-ENTMCNC: 32.9 G/DL (ref 31.5–35.7)
MCV RBC AUTO: 98 FL (ref 79–97)
PLATELET # BLD AUTO: 260 X10E3/UL (ref 150–379)
POTASSIUM SERPL-SCNC: 5 MMOL/L (ref 3.5–5.2)
PROT SERPL-MCNC: 6.5 G/DL (ref 6–8.5)
RBC # BLD AUTO: 4.56 X10E6/UL (ref 3.77–5.28)
SODIUM SERPL-SCNC: 141 MMOL/L (ref 134–144)
TRIGL SERPL-MCNC: 81 MG/DL (ref 0–149)
VLDLC SERPL CALC-MCNC: 16 MG/DL (ref 5–40)
WBC # BLD AUTO: 7.5 X10E3/UL (ref 3.4–10.8)

## 2017-05-21 DIAGNOSIS — K21.9 GASTROESOPHAGEAL REFLUX DISEASE WITHOUT ESOPHAGITIS: ICD-10-CM

## 2017-05-22 RX ORDER — OMEPRAZOLE 40 MG/1
CAPSULE, DELAYED RELEASE ORAL
Qty: 30 CAP | Refills: 0 | Status: SHIPPED | OUTPATIENT
Start: 2017-05-22 | End: 2018-01-30 | Stop reason: SDUPTHER

## 2017-05-30 DIAGNOSIS — R92.8 ABNORMAL MAMMOGRAM: Primary | ICD-10-CM

## 2017-06-01 DIAGNOSIS — M05.9 SEROPOSITIVE RHEUMATOID ARTHRITIS (HCC): Chronic | ICD-10-CM

## 2017-06-02 RX ORDER — METHOTREXATE 2.5 MG/1
TABLET ORAL
Qty: 60 TAB | Refills: 0 | Status: SHIPPED | OUTPATIENT
Start: 2017-06-02 | End: 2017-06-14 | Stop reason: SDUPTHER

## 2017-06-06 ENCOUNTER — TELEPHONE (OUTPATIENT)
Dept: FAMILY MEDICINE CLINIC | Age: 77
End: 2017-06-06

## 2017-06-12 DIAGNOSIS — Z12.31 SCREENING MAMMOGRAM, ENCOUNTER FOR: ICD-10-CM

## 2017-06-14 ENCOUNTER — OFFICE VISIT (OUTPATIENT)
Dept: RHEUMATOLOGY | Age: 77
End: 2017-06-14

## 2017-06-14 VITALS
WEIGHT: 160 LBS | RESPIRATION RATE: 18 BRPM | SYSTOLIC BLOOD PRESSURE: 143 MMHG | TEMPERATURE: 96.3 F | BODY MASS INDEX: 28.35 KG/M2 | DIASTOLIC BLOOD PRESSURE: 102 MMHG | HEART RATE: 66 BPM | OXYGEN SATURATION: 100 % | HEIGHT: 63 IN

## 2017-06-14 DIAGNOSIS — M05.9 SEROPOSITIVE RHEUMATOID ARTHRITIS (HCC): Primary | Chronic | ICD-10-CM

## 2017-06-14 DIAGNOSIS — M17.0 PRIMARY OSTEOARTHRITIS OF BOTH KNEES: ICD-10-CM

## 2017-06-14 DIAGNOSIS — Z79.60 LONG-TERM USE OF IMMUNOSUPPRESSANT MEDICATION: ICD-10-CM

## 2017-06-14 DIAGNOSIS — M19.042 PRIMARY OSTEOARTHRITIS OF BOTH HANDS: ICD-10-CM

## 2017-06-14 DIAGNOSIS — J84.9 ILD (INTERSTITIAL LUNG DISEASE) (HCC): ICD-10-CM

## 2017-06-14 DIAGNOSIS — R91.8 PULMONARY NODULES: ICD-10-CM

## 2017-06-14 DIAGNOSIS — R93.89 ABNORMAL CT SCAN, CHEST: ICD-10-CM

## 2017-06-14 DIAGNOSIS — M19.041 PRIMARY OSTEOARTHRITIS OF BOTH HANDS: ICD-10-CM

## 2017-06-14 RX ORDER — METHOTREXATE 2.5 MG/1
12.5 TABLET ORAL
Qty: 60 TAB | Refills: 0 | Status: SHIPPED | OUTPATIENT
Start: 2017-06-18 | End: 2017-09-14 | Stop reason: SDUPTHER

## 2017-06-14 RX ORDER — FOLIC ACID 1 MG/1
1 TABLET ORAL DAILY
Qty: 90 TAB | Refills: 0 | Status: SHIPPED | OUTPATIENT
Start: 2017-06-14 | End: 2017-08-27 | Stop reason: SDUPTHER

## 2017-06-14 NOTE — PATIENT INSTRUCTIONS
Back Stretches: Exercises  Your Care Instructions  Here are some examples of exercises for stretching your back. Start each exercise slowly. Ease off the exercise if you start to have pain. Your doctor or physical therapist will tell you when you can start these exercises and which ones will work best for you. How to do the exercises  Overhead stretch    1. Stand comfortably with your feet shoulder-width apart. 2. Looking straight ahead, raise both arms over your head and reach toward the ceiling. Do not allow your head to tilt back. 3. Hold for 15 to 30 seconds, then lower your arms to your sides. 4. Repeat 2 to 4 times. Side stretch    1. Stand comfortably with your feet shoulder-width apart. 2. Raise one arm over your head, and then lean to the other side. 3. Slide your hand down your leg as you let the weight of your arm gently stretch your side muscles. Hold for 15 to 30 seconds. 4. Repeat 2 to 4 times on each side. Press-up    1. Lie on your stomach, supporting your body with your forearms. 2. Press your elbows down into the floor to raise your upper back. As you do this, relax your stomach muscles and allow your back to arch without using your back muscles. As your press up, do not let your hips or pelvis come off the floor. 3. Hold for 15 to 30 seconds, then relax. 4. Repeat 2 to 4 times. Relax and rest    1. Lie on your back with a rolled towel under your neck and a pillow under your knees. Extend your arms comfortably to your sides. 2. Relax and breathe normally. 3. Remain in this position for about 10 minutes. 4. If you can, do this 2 or 3 times each day. Follow-up care is a key part of your treatment and safety. Be sure to make and go to all appointments, and call your doctor if you are having problems. It's also a good idea to know your test results and keep a list of the medicines you take. Where can you learn more? Go to http://jimmie-geronimo.info/.   Enter I739 in the search box to learn more about \"Back Stretches: Exercises. \"  Current as of: May 23, 2016  Content Version: 11.2  © 9076-7730 [facility]. Care instructions adapted under license by [employer] (which disclaims liability or warranty for this information). If you have questions about a medical condition or this instruction, always ask your healthcare professional. [place] [address] any warranty or liability for your use of this information.

## 2017-06-14 NOTE — MR AVS SNAPSHOT
Visit Information Date & Time Provider Department Dept. Phone Encounter #  
 6/14/2017 10:00 AM Osito Barlow MD [facility] [id number] Follow-up Instructions Return in about 3 months (around 9/14/2017). Upcoming Health Maintenance Date Due INFLUENZA AGE 9 TO ADULT 8/1/2017 COLONOSCOPY 2/7/2018 MEDICARE YEARLY EXAM 4/25/2018 GLAUCOMA SCREENING Q2Y 5/9/2018 DTaP/Tdap/Td series (2 - Td) 4/21/2024 Allergies as of 6/14/2017  Review Complete On: 6/14/2017 By: Nery Gottlieb RN No Known Allergies Current Immunizations  Reviewed on 11/16/2016 Name Date Influenza High Dose Vaccine PF 10/10/2016 Pneumococcal Polysaccharide (PPSV-23) 10/10/2016 Pneumococcal Vaccine (Pcv) 10/31/2007 Tdap 4/21/2014 Not reviewed this visit You Were Diagnosed With   
  
 Codes Comments Seropositive rheumatoid arthritis ([facility]    -  Primary ICD-10-CM: M05.9 ICD-9-CM: 714.0 Long-term use of immunosuppressant medication     ICD-10-CM: Z79.899 ICD-9-CM: V58.69 Primary osteoarthritis of both hands     ICD-10-CM: M19.041, G13.430 ICD-9-CM: 715.14 Primary osteoarthritis of both knees     ICD-10-CM: M17.0 ICD-9-CM: 715.16 Pulmonary nodules     ICD-10-CM: R91.8 ICD-9-CM: 793.19 Abnormal CT scan, chest     ICD-10-CM: R93.8 ICD-9-CM: 793.2 ILD (interstitial lung disease) ([facility]     ICD-10-CM: J84.9 ICD-9-CM: 688 Vitals BP Pulse Temp Resp Height(growth percentile) Weight(growth percentile) (!) 143/102 (BP 1 Location: Right arm, BP Patient Position: Sitting) 66 96.3 °F (35.7 °C) 18 5' 3\" (1.6 m) 160 lb (72.6 kg) SpO2 BMI OB Status Smoking Status 100% 28.34 kg/m2 Hysterectomy Former Smoker BMI and BSA Data Body Mass Index Body Surface Area  
 28.34 kg/m 2 1.8 m 2 Preferred Pharmacy Pharmacy Name Phone  [id number] [place], [place] - [address] [facility] [address] [phone] Your Updated Medication List  
  
   
This list is accurate as of: 6/14/17 10:14 AM.  Always use your most recent med list.  
  
  
  
  
 clobetasol 0.05 % ointment Commonly known as:  TEMOVATE  
APPLY TO AFFECTED AREA ON SCALP TWICE A DAY AS NEEDED  
  
 folic acid 1 mg tablet Commonly known as:  Karron Crumb Take 1 Tab by mouth daily for 90 days. ibuprofen 400 mg tablet Commonly known as:  MOTRIN Take  by mouth every six (6) hours as needed for Pain. methotrexate 2.5 mg tablet Commonly known as:  Luz Halls Take 5 Tabs by mouth every Sunday for 90 days. Start taking on:  6/18/2017 MSM 1,000 mg Tab Generic drug:  methylsulfonylmethane Take  by mouth. Indications: Joint Health  
  
 omeprazole 40 mg capsule Commonly known as:  PRILOSEC  
TAKE ONE CAPSULE BY MOUTH EVERY DAY  
  
 OTHER Indications: Calcium and Vitamin D3 1200 mg total  
  
 PROBIOTIC 4X 10-15 mg Tbec Generic drug:  B.infantis-B.ani-B.long-B.bifi Take  by mouth. VITAMIN C 500 mg tablet Generic drug:  ascorbic acid (vitamin C) Take  by mouth. Takes 1 tab daily. vitamin E 400 unit capsule Commonly known as:  [address] Take  by mouth daily. Prescriptions Sent to Pharmacy Refills  
 methotrexate (RHEUMATREX) 2.5 mg tablet 0 Starting on: 6/18/2017 Sig: Take 5 Tabs by mouth every Sunday for 90 days. Class: Normal  
 Pharmacy: [id number] [id number] [email or web address] Ph #: [phone] Route: Oral  
 folic acid (FOLVITE) 1 mg tablet 0 Sig: Take 1 Tab by mouth daily for 90 days. Class: Normal  
 Pharmacy: [facility] #[id number]- [place] [email or web address] Ph #: [phone] Route: Oral  
  
We Performed the Following C REACTIVE PROTEIN, QT [66597 CPT(R)] CBC WITH AUTOMATED DIFF [44298 CPT(R)]  METABOLIC PANEL, COMPREHENSIVE [id number] CPT(R)] SED RATE (ESR) [id number] CPT(R)] Follow-up Instructions Return in about 3 months (around 9/14/2017). To-Do List   
 06/14/2017 Imaging:  CT CHEST WO CONT   
  
 06/14/2017 PFT:  PFT DLCO   
  
 06/14/2017 PFT:  PULMONARY FUNCTION TEST Patient Instructions Back Stretches: Exercises Your Care Instructions Here are some examples of exercises for stretching your back. Start each exercise slowly. Ease off the exercise if you start to have pain. Your doctor or physical therapist will tell you when you can start these exercises and which ones will work best for you. How to do the exercises Overhead stretch 1. Stand comfortably with your feet shoulder-width apart. 2. Looking straight ahead, raise both arms over your head and reach toward the ceiling. Do not allow your head to tilt back. 3. Hold for 15 to 30 seconds, then lower your arms to your sides. 4. Repeat 2 to 4 times. Side stretch 1. Stand comfortably with your feet shoulder-width apart. 2. Raise one arm over your head, and then lean to the other side. 3. Slide your hand down your leg as you let the weight of your arm gently stretch your side muscles. Hold for 15 to 30 seconds. 4. Repeat 2 to 4 times on each side. Press-up 1. Lie on your stomach, supporting your body with your forearms. 2. Press your elbows down into the floor to raise your upper back. As you do this, relax your stomach muscles and allow your back to arch without using your back muscles. As your press up, do not let your hips or pelvis come off the floor. 3. Hold for 15 to 30 seconds, then relax. 4. Repeat 2 to 4 times. Relax and rest 
 
1. Lie on your back with a rolled towel under your neck and a pillow under your knees. Extend your arms comfortably to your sides. 2. Relax and breathe normally. 3. Remain in this position for about 10 minutes. 4. If you can, do this 2 or 3 times each day. Follow-up care is a key part of your treatment and safety. Be sure to make and go to all appointments, and call your doctor if you are having problems. It's also a good idea to know your test results and keep a list of the medicines you take. Where can you learn more? Go to http://jimmie-geronimo.info/. Enter G137 in the search box to learn more about \"Back Stretches: Exercises. \" Current as of: May 23, 2016 Content Version: 11.2 © 5128-4973 [employer]. Care instructions adapted under license by [employer] (which disclaims liability or warranty for this information). Introducing [facility]! [facility] introduces Sprinklr patient portal. Now you can access parts of your medical record, email your doctor's office, and request medication refills online. 1. In your internet browser, go to [email or web address] 2. Click on the First Time User? Click Here link in the Sign In box. You will see the New Member Sign Up page. 3. Enter your Sprinklr Access Code exactly as it appears below. You will not need to use this code after youve completed the sign-up process. If you do not sign up before the expiration date, you must request a new code. · Sprinklr Access Code: [email or web address] Expires: 9/12/2017 10:12 AM 
 
4. Enter the last four digits of your Social Security Number (xxxx) and Date of Birth (mm/dd/yyyy) as indicated and click Submit. You will be taken to the next sign-up page. 5. Create a Sprinklr ID. This will be your Sprinklr login ID and cannot be changed, so think of one that is secure and easy to remember. 6. Create a Sprinklr password. You can change your password at any time. 7. Enter your Password Reset Question and Answer. This can be used at a later time if you forget your password. 8. Enter your e-mail address. You will receive e-mail notification when new information is available in [address] 9. Click Sign Up. You can now view and download portions of your medical record. 10. Click the Download Summary menu link to download a portable copy of your medical information. If you have questions, please visit the Frequently Asked Questions section of the [employer] website. Remember, [employer] is NOT to be used for urgent needs. For medical emergencies, dial 911. Now available from your iPhone and Android! Please provide this summary of care documentation to your next provider. Your primary care clinician is listed as Rachael Gonzales. If you have any questions after today's visit, please call [phone].

## 2017-06-14 NOTE — PROGRESS NOTES
REASON FOR VISIT    This is a follow-up visit for Ms. Sujatha Neil for Seropositive Rheumatoid Arthritis and Osteopenia. Inflammatory arthritis phenotype includes:  Anti-CCP positive: no  Rheumatoid factor positive: yes (low titer 19.3)  Erosive disease: no  Extra-articular manifestations include: pericardial effusion, pulmonary nodules and GGO    Immunosuppression Screening (5/27/2016): Quantiferon TB: negative  PPD:  Not performed  Hepatitis B: negative  Hepatitis C: negative    Therapy History includes:    Current DMARD therapy include: methotrexate 12.5 mg every Sunday (off one month)  Prior DMARD therapy include: methotrexate    Discontinued DMARDs because of inefficacy: None  Discontinued DMARDs because of side effects: None    Immunizations:   Immunization History   Administered Date(s) Administered    Influenza High Dose Vaccine PF 10/10/2016    Pneumococcal Polysaccharide (PPSV-23) 10/10/2016    Pneumococcal Vaccine (Pcv) 10/31/2007    Tdap 04/21/2014       Active problems include:    Patient Active Problem List   Diagnosis Code    DJD (degenerative joint disease) M19.90    Cyst     Osteopenia M85.80    Hx of mammogram Z92.89    Glucose intolerance (impaired glucose tolerance) R73.02    Pericardial effusion I31.3    Seropositive rheumatoid arthritis (HCC) M05.9    Primary osteoarthritis of both hands M19.041, M19.042    Primary osteoarthritis of both knees M17.0    Trochanteric bursitis of both hips M70.61, M70.62    Chronic midline low back pain with right-sided sciatica M54.41, G89.29    Pulmonary nodules R91.8    Abnormal CT scan, chest R93.8    Long-term use of immunosuppressant medication Z79.899    ILD (interstitial lung disease) ([facility]) J84.9       HISTORY OF PRESENT ILLNESS    Ms. Sujatha Neil returns for a follow-up visit. On her last visit, I continued her methotrexate to 12.5 mg every Sunday. She has been off for a month therapy because she forgot to refill it.     She has been more active. Today, she denies pain, swelling, but has stiffness lasting 20 minutes. She denies chest discomfort or dyspnea but she endorse a dry cough. Last blood work was done on 4/24/2017 and did not reveal any significant adverse effects. Most recent inflammatory markers from 11/16/2016 revealed a normal ESR 4 mm/hr (previously 3, 10 mm/hr) and CRP 0.8 mg/L (previously 1.3, 0.7 mg/dL). The patient has not had any interval hospital admissions, infections, or surgeries. REVIEW OF SYSTEMS    A comprehensive review of systems was performed and pertinent results are documented in the HPI, review of systems is otherwise non-contributory. PAST MEDICAL HISTORY    She has a past medical history of Carpal tunnel syndrome; DJD (degenerative joint disease); DJD (degenerative joint disease) of knees (4/9/2013); Eczema; GERD (gastroesophageal reflux disease); Neuropathy; Osteoarthritis; Osteopenia; and Post-menopausal. She also has no past medical history of Difficult intubation; Essential hypertension; Nausea & vomiting; or Unspecified adverse effect of anesthesia. FAMILY HISTORY    Her family history includes Arthritis-osteo in her brother, father, sister, and sister; Elevated Lipids in her sister; Heart Disease (age of onset: 72) in her mother; Hypertension in her brother, maternal aunt, and maternal uncle; Stroke in her maternal aunt and maternal uncle; Thyroid Disease in her sister. SOCIAL HISTORY    She reports that she quit smoking about 3 years ago. Her smoking use included Cigarettes. She smoked 0.10 packs per day. She has never used smokeless tobacco. She reports that she does not drink alcohol or use illicit drugs. MEDICATIONS    Current Outpatient Prescriptions   Medication Sig Dispense Refill    [START ON 6/18/2017] methotrexate (RHEUMATREX) 2.5 mg tablet Take 5 Tabs by mouth every Sunday for 90 days.  60 Tab 0    folic acid (FOLVITE) 1 mg tablet Take 1 Tab by mouth daily for 90 days. 90 Tab 0    ibuprofen (MOTRIN) 400 mg tablet Take  by mouth every six (6) hours as needed for Pain.      B.infantis-B.ani-B.long-B.bifi (PROBIOTIC 4X) 10-15 mg TbEC Take  by mouth.  vitamin E (AQUA GEMS) 400 unit capsule Take  by mouth daily.  methylsulfonylmethane (MSM) 1,000 mg tab Take  by mouth. Indications: Joint Health      OTHER Indications: Calcium and Vitamin D3 1200 mg total      ascorbic acid, vitamin C, (VITAMIN C) 500 mg tablet Take  by mouth. Takes 1 tab daily.  omeprazole (PRILOSEC) 40 mg capsule TAKE ONE CAPSULE BY MOUTH EVERY DAY 30 Cap 0    clobetasol (TEMOVATE) 0.05 % ointment APPLY TO AFFECTED AREA ON SCALP TWICE A DAY AS NEEDED  1        ALLERGIES    No Known Allergies    PHYSICAL EXAMINATION    Visit Vitals    BP (!) 143/102 (BP 1 Location: Right arm, BP Patient Position: Sitting)    Pulse 66    Temp 96.3 °F (35.7 °C)    Resp 18    Ht 5' 3\" (1.6 m)    Wt 160 lb (72.6 kg)    SpO2 100%    BMI 28.34 kg/m2     Body mass index is 28.34 kg/(m^2). General: Patient is alert, oriented x 3, not in acute distress    HEENT:   Sclerae are not injected and appear moist.  Oral mucous membranes are moist, there are no ulcers present. There is no alopecia. Neck is supple     Cardiovascular:  Heart is regular rate and rhythm, no murmurs. Chest:  Lungs are clear to auscultation bilaterally. No rhonchi, wheezes, or crackles. Extremities:  Free of clubbing, cyanosis, edema    Neurological exam:  No focal sensory deficits, muscle strength is full in upper and lower extremities     Skin exam:  There are no rashes, no alopecia, no discoid lesions, no active Raynaud's, no livedo reticularis, no periungual erythema. Musculoskeletal exam:  A comprehensive musculoskeletal exam was performed for all joints of each upper and lower extremity and assessed for swelling, tenderness and range of motion.  Positive results are documented as below:     Bilateral Michael and Heberden nodes  Bilateral knee crepitus without effusion   Bilateral hallux valgus  Hammer toes  Bilateral trochanteric buristis    Joint Count 6/14/2017 3/14/2017 2/14/2017 11/16/2016 8/12/2016 6/15/2016 5/27/2016   Patient pain (0-100) 0 0 0 0 5 10 10   MHAQ 0 0 0 0 0 0 0   Left elbow - Tender - - - - 1 - -   Left elbow - Swollen - - - - 1 - -   Left wrist- Tender 0 0 0 - - - 1   Left wrist- Swollen 1 1 1 1 1 1 1   Left 1st MCP - Tender - - - - - 1 -   Left 1st MCP - Swollen 1 - - - 1 1 -   Left 2nd MCP - Tender - - - - - 1 1   Left 2nd MCP - Swollen 1 - - - 1 1 1   Left 3rd MCP - Tender - - - - - - 1   Left 3rd MCP - Swollen 1 - - - - 1 1   Left 5th MCP - Swollen 1 - - 1 - - -   Left thumb IP - Swollen 1 - - - - - -   Left 2nd PIP - Swollen 1 - - - - - -   Left 3rd PIP - Swollen 1 1 - - - - -   Right elbow - Tender - - - - 1 - -   Right elbow - Swollen - - - - 1 - -   Right wrist- Tender - - - - - 1 1   Right wrist- Swollen 1 - 1 1 1 1 1   Right 1st MCP - Tender - - - - 1 1 -   Right 1st MCP - Swollen 1 - 1 - 1 1 -   Right 2nd MCP - Tender - - - - - - 1   Right 2nd MCP - Swollen 1 - - 1 - 1 1   Right 3rd MCP - Tender - - - - 1 - 1   Right 3rd MCP - Swollen 1 - - - 1 1 1   Right 5th MCP - Swollen 1 - 1 1 - - -   Right 2nd PIP - Tender - - - - - - 1   Right 2nd PIP - Swollen 1 - 1 - - - -   Right 3rd PIP - Tender - - - - - - 1   Right 3rd PIP - Swollen 1 1 - - - - -   Right 4th PIP - Tender - - - - - - 1   Right 5th PIP - Tender - - - - - - 1   Tender Joint Count (Total) 0 0 0 - - - -   Swollen Joint Count (Total) 15 3 5 - - - -   Physician Assessment (0-10) 2 1 2 2 2 20 20   Patient Assessment (0-10) 0 0.5 0.5 1 2 25 10   CDAI Total (calculated) 17 4.5 7.5 - - - -     DATA REVIEW    Laboratory     The following laboratory results were reviewed and discussed with the patient:    Office Visit on 04/24/2017   Component Date Value    WBC 04/24/2017 7.5     RBC 04/24/2017 4.56     HGB 04/24/2017 14.7     HCT 04/24/2017 44.7  MCV 04/24/2017 98*    MCH 04/24/2017 32.2     MCHC 04/24/2017 32.9     RDW 04/24/2017 15.2     PLATELET 94/15/6606 086     Glucose 04/24/2017 86     BUN 04/24/2017 12     Creatinine 04/24/2017 0.88     GFR est non-AA 04/24/2017 65     GFR est AA 04/24/2017 75     BUN/Creatinine ratio 04/24/2017 14     Sodium 04/24/2017 141     Potassium 04/24/2017 5.0     Chloride 04/24/2017 100     CO2 04/24/2017 26     Calcium 04/24/2017 9.3     Protein, total 04/24/2017 6.5     Albumin 04/24/2017 3.9     GLOBULIN, TOTAL 04/24/2017 2.6     A-G Ratio 04/24/2017 1.5     Bilirubin, total 04/24/2017 0.8     Alk. phosphatase 04/24/2017 60     AST (SGOT) 04/24/2017 27     ALT (SGPT) 04/24/2017 28     Cholesterol, total 04/24/2017 171     Triglyceride 04/24/2017 81     HDL Cholesterol 04/24/2017 85     VLDL, calculated 04/24/2017 16     LDL, calculated 04/24/2017 70     INTERPRETATION 04/24/2017 Note      Imaging    Musculoskeletal Ultrasound    Ultrasound of the right wrist. Indication: joint swelling. (5/27/2016)  Using a Maraquiaiq e with 18 Mhz probe, limited views of the right wrist were obtained. This revealed hypoechoic collection without doppler within the midcarpal joint space. The tendons were normal. Bony contours were regular without erosions seen. There were no soft tissue masses noted. Impression: synovial hypertrophy    Radiographs    Bilateral Hand 5/27/2016: Moderate bilateral triscaphe joint osteoarthritis. Moderate left and mild right first MC joint osteoarthritis. Moderate bilateral first MCP joint osteoarthritis. Multifocal IP joint osteoarthritis is bilateral. There are central erosions in the right first through fourth DIP joints and left third and fifth MP joints. Subtle marginal erosions in the left second DIP joint. No fracture or dislocation on plain film. Alignment is within normal limits. Bone mineralization is decreased. No soft tissue calcification.     Bilateral Shoulder 5/27/2016: LEFT: demonstrate mild osteopenia. No acute fracture or dislocation. Age-appropriate AC joint osteoarthritis with marginal osteophytes. Glenohumeral joint is within normal limits for age. No evidence of erosion. RIGHT: demonstrate osteopenia. No acute fracture or dislocation. AC joint osteoarthritis includes marginal osteophytes. Glenohumeral joint osteoarthritis is age-appropriate. Cortical irregularity of the greater tuberosity of the humerus indicates underlying rotator cuff pathology    Left Knee 12/29/2015: no fracture. Joint effusion and prepatellar soft tissue contusion versus bursitis. Increased osteoarthritis. Lumbar Spine 12/29/2015: stable dextroconvex scoliosis without evidence of acute fracture or subluxation. Significant multilevel degenerative disc disease is again noted from L2-S1. The patient is status post laminectomy from L1 through to the sacrum. Multilevel facet degenerative arthritis again noted. Left Knee 8/03/2010: sclerosis and deformity of the lateral tibial plateau with associated lateral compartment osteoarthritis is suggestive of old injury. No acute fracture is seen. There is moderate to severe patellofemoral     CT Imaging    CT Chest without contrast 7/22/2016: Small lingular and right middle lobe nodules and left lower lobe groundglass opacity all unchanged. This represents a 16 month follow-up examination. The small middle lobe nodule and groundglass opacity in the left lower lobe are essentially unchanged in comparison with 3/9/2011. CT Chest with contrast 1/27/2016: nodules in both lungs including a soft more groundglass appearance nodule in the left lower lobe are stable when compared to the previous examination. No new acute finding or new nodule. Small pericardial effusion. Hiatal hernia. CT Chest without contrast 6/15/2015: stable semisolid nodule anteriorly in left lower lobe. The other nodules are stable. Decreasing pericardial effusion.  Stable adenopathy. She underwent a fine needle biopsy which showed abundant benign and reactive bronchial epithelial cells and no malignancy. CT Chest without contrast 5/13/2015: a suspicious semisolid nodule in the anterior segment of the left lower lobe just posterior to the fissure with some retraction of the fissure. This measures 1.2 x 1 x 1.4 cm. PET/CT negative for an 4/10/2015. Moderate stable pericardial effusion. Stable enlarged lymph nodes as described. Several other small pulmonary nodules also stable. PET/CT Scan 4/10/2015: showed the vague semisolid nodule in the left lower lobe demonstrates no increased metabolic activity on PET suggesting benign etiology but is nonspecific and neoplasm is not entirely excluded. There are 2 lymph nodes in the right neck which are normal in size and demonstrate slight increased metabolic activity nonspecific most likely reactive. CT Heart without contrast with calcium 3/12/2015: total calcium score of 0. A low score suggests a low likelihood of coronary disease but does not exclude the possibility of significant coronary artery narrowing. 3 mm right middle lobe lung nodule unchanged. Pericardial effusion. Hiatal hernia. A CT chest without contrast showed a suspicious semisolid nodule in the anterior segment of the left lower lobe just posterior to the fissure with some retraction of the fissure. This measures 1.2 x 1 x 1.4 cm. This could represent a small bronchogenic carcinoma. Moderate pericardial effusion clinical correlation is recommended. 2 enlarged lymph nodes as described. Several other small pulmonary nodules can be evaluated on followup 23X    CT Heart without contrast with calcium 3/09/2011: total calcium score of 0. A low score suggests a low likelihood of coronary disease but does not exclude the possibility of significant coronary artery narrowing. Small pericardial effusion. 3 mm pulmonary nodule right middle lobe. 7 mm density left lower lobe.     MR Imaging    None    DXA    DXA 5/21/2015: lumbar spine L1-L4 T score 1.7 (BMD 1.389 g/cm2), left femoral neck T score: -2.2 (0.728 g/cm2), right femoral neck  T score: -1.4 (0.825 g/cm2), total hip T score: -2.2 (0.728 g/cm2). Echocardiogram    Echocardiogram 6/03/2015: LEFT VENTRICLE: Size was normal. Systolic function was normal. Ejection fraction was estimated in the range of 60 % to 65 %. There were no regional wall motion abnormalities. Wall thickness was mildly increased. DOPPLER: Doppler parameters were consistent with abnormal left ventricular relaxation (grade 1 diastolic dysfunction). RIGHT VENTRICLE: The size was normal. Systolic function was normal. LEFT ATRIUM: Size was normal. LA volume index was 23 ml/m squared. ATRIAL SEPTUM: There was a small atrial septal aneurysm. There was no evidence of shunt on color flow imaging. RIGHT ATRIUM: Size was normal. MITRAL VALVE: Normal valve structure. DOPPLER: There was trivial regurgitation. AORTIC VALVE: Normal valve structure. DOPPLER: Transaortic velocity was within the normal range. There was no stenosis. There was no significant regurgitation. TRICUSPID VALVE: Normal valve structure. DOPPLER: There was mild regurgitation. Right atrial pressure estimate: 5 mmHg. Pulmonary artery systolic pressure: 25 mmHg. There was no pulmonary hypertension. PULMONIC VALVE: Not well visualized. DOPPLER: The transpulmonic velocity was within the normal range. There was trivial regurgitation. AORTA: The root exhibited normal size. SYSTEMIC VEINS: IVC: The inferior vena cava was normal in size. PERICARDIUM: A small pericardial effusion was identified along the right atrial free wall. There was no evidence of hemodynamic compromise. PATHOLOGY    Fine Need Aspirate of LLL 6/15/2015: Abundant benign and reactive bronchial epithelial cells.  No cells diagnostic for malignancy    PROCEDURE     Bilateral trochanteric bursitis Kenalog 40 mg. (02/14/17)     Bilateral Knee Kenalog 40 mg. (11/16/2016)    ASSESSMENT AND PLAN    This is a follow-up visit for Ms. Elvia Haywood. 1) Seropositive Rheumatoid Arthritis with Interstitial Lung Disease. Today, her CDAI 17  (previously 4.5, 7.5, 8, 16, 15.5, 19), with 0 tender and 15 swollen. She has been off methotrexate for 4 weeks so I refilled it today. I ordered CT chest and pulmonary function tests today which she will do at an outside hospital. Labs today. 2) Long Term Use of Immunosuppressants. The patient remains on immunomodulatory medications (methotrexate) and requires frequent toxicity monitoring by blood work. Respective labs were ordered (CBC and CMP). 3) Bilateral Hands and Knee Osteoarthritis. This was not an active issue today. 4) Bilateral Trochanteric Bursitis. This is an active issue today. I injected both bursa with good tolerance. 5) Osteopenia. Her DXA in 5/21/2015 showed lumbar spine L1-L4 T score 1.7 (BMD 1.389 g/cm2), left femoral neck T score: -2.2 (0.728 g/cm2), right femoral neck T score: -1.4 (0.825 g/cm2), total hip T score: -2.2 (0.728 g/cm2). She is due for a repeat study. I will order on follow up. 6) Pericardial Effusion. This is chronic and asymptomatic. 7) Pulmonary Nodule and Ground Glass Opacities. She endorses of a chrThis has been monitored serially without malignancy seen on tissue sampling. It may be due to #1. I ordered a CT chest and pulmonary function tests today. She will do at local outside hospital.     8) Chronic Lower Back Pain. She has a spinal cord stimulator. This was not an active issue today. The patient voiced understanding of the aforementioned assessment and plan. Summary of plan was provided in the After Visit Summary patient instructions.      TODAY'S ORDERS    Orders Placed This Encounter    CT CHEST WO CONT    CBC WITH AUTOMATED DIFF    METABOLIC PANEL, COMPREHENSIVE    C REACTIVE PROTEIN, QT    SED RATE (ESR)    PULMONARY FUNCTION TEST    PFT DLCO    methotrexate (RHEUMATREX) 2.5 mg tablet    folic acid (FOLVITE) 1 mg tablet     Future Appointments  Date Time Provider Franny Willis   9/14/2017 10:00 AM MD Ranjit Daigle MD, [address] [facility]    Adult Rheumatology   Musculoskeletal Ultrasound Certified  [address] [place] [address] Av   [address] [address]   Phone [phone]  Fax [phone]

## 2017-06-15 LAB
ALBUMIN SERPL-MCNC: 3.9 G/DL (ref 3.5–4.8)
ALBUMIN/GLOB SERPL: 1.4 {RATIO} (ref 1.2–2.2)
ALP SERPL-CCNC: 66 IU/L (ref 39–117)
ALT SERPL-CCNC: 22 IU/L (ref 0–32)
AST SERPL-CCNC: 26 IU/L (ref 0–40)
BASOPHILS # BLD AUTO: 0 X10E3/UL (ref 0–0.2)
BASOPHILS NFR BLD AUTO: 1 %
BILIRUB SERPL-MCNC: 0.7 MG/DL (ref 0–1.2)
BUN SERPL-MCNC: 17 MG/DL (ref 8–27)
BUN/CREAT SERPL: 19 (ref 12–28)
CALCIUM SERPL-MCNC: 9.4 MG/DL (ref 8.7–10.3)
CHLORIDE SERPL-SCNC: 103 MMOL/L (ref 96–106)
CO2 SERPL-SCNC: 22 MMOL/L (ref 18–29)
CREAT SERPL-MCNC: 0.88 MG/DL (ref 0.57–1)
CRP SERPL-MCNC: 1 MG/L (ref 0–4.9)
EOSINOPHIL # BLD AUTO: 0.2 X10E3/UL (ref 0–0.4)
EOSINOPHIL NFR BLD AUTO: 2 %
ERYTHROCYTE [DISTWIDTH] IN BLOOD BY AUTOMATED COUNT: 14 % (ref 12.3–15.4)
ERYTHROCYTE [SEDIMENTATION RATE] IN BLOOD BY WESTERGREN METHOD: 9 MM/HR (ref 0–40)
GLOBULIN SER CALC-MCNC: 2.8 G/DL (ref 1.5–4.5)
GLUCOSE SERPL-MCNC: 84 MG/DL (ref 65–99)
HCT VFR BLD AUTO: 45.1 % (ref 34–46.6)
HGB BLD-MCNC: 14.8 G/DL (ref 11.1–15.9)
IMM GRANULOCYTES # BLD: 0 X10E3/UL (ref 0–0.1)
IMM GRANULOCYTES NFR BLD: 0 %
LYMPHOCYTES # BLD AUTO: 2.2 X10E3/UL (ref 0.7–3.1)
LYMPHOCYTES NFR BLD AUTO: 28 %
MCH RBC QN AUTO: 31.4 PG (ref 26.6–33)
MCHC RBC AUTO-ENTMCNC: 32.8 G/DL (ref 31.5–35.7)
MCV RBC AUTO: 96 FL (ref 79–97)
MONOCYTES # BLD AUTO: 0.7 X10E3/UL (ref 0.1–0.9)
MONOCYTES NFR BLD AUTO: 9 %
NEUTROPHILS # BLD AUTO: 4.8 X10E3/UL (ref 1.4–7)
NEUTROPHILS NFR BLD AUTO: 60 %
PLATELET # BLD AUTO: 241 X10E3/UL (ref 150–379)
POTASSIUM SERPL-SCNC: 4.7 MMOL/L (ref 3.5–5.2)
PROT SERPL-MCNC: 6.7 G/DL (ref 6–8.5)
RBC # BLD AUTO: 4.71 X10E6/UL (ref 3.77–5.28)
SODIUM SERPL-SCNC: 143 MMOL/L (ref 134–144)
WBC # BLD AUTO: 7.9 X10E3/UL (ref 3.4–10.8)

## 2017-06-20 ENCOUNTER — HOSPITAL ENCOUNTER (OUTPATIENT)
Dept: PULMONOLOGY | Age: 77
Discharge: HOME | End: 2017-06-20
Attending: INTERNAL MEDICINE
Payer: MEDICARE

## 2017-06-20 ENCOUNTER — HOSPITAL ENCOUNTER (OUTPATIENT)
Dept: CT IMAGING | Age: 77
Discharge: HOME | End: 2017-06-20
Attending: INTERNAL MEDICINE
Payer: MEDICARE

## 2017-06-20 DIAGNOSIS — M05.9 SEROPOSITIVE RHEUMATOID ARTHRITIS (HCC): Chronic | ICD-10-CM

## 2017-06-20 DIAGNOSIS — J84.9 ILD (INTERSTITIAL LUNG DISEASE) (HCC): ICD-10-CM

## 2017-06-20 PROCEDURE — 94010 BREATHING CAPACITY TEST: CPT

## 2017-06-20 PROCEDURE — 71250 CT THORAX DX C-: CPT

## 2017-06-20 PROCEDURE — 94729 DIFFUSING CAPACITY: CPT

## 2017-06-21 NOTE — PROCEDURES
[address]   [address] [place] [address], [address]   PULMONARY FUNCTION       Name:  Elise Bishop   MR#:  [id number]   :  1940   Account #:  [de-identified]    Date of Procedure:  2017   Date of Adm:  2017       SPIROMETRY AND DIFFUSION    REQUESTING PHYSICIAN: Eva Post MD    INDICATIONS: M05.9, J84.9. Spirometry reveals normal vital capacity, normal FEV1, but moderate   reduction in mid-expiratory flow and peak expiratory flow. It is   consistent with some early restrictive physiology, presumably,   however, with repeat testing, lung volumes would be helpful in   discriminating this. Lung volumes not ordered.      Diffusion is normal and when alveolar ventilation is considered, it is   supranormal.         MD SHELDON Dela Cruz / MARY   D:  2017   16:38   T:  2017   06:51   Job #:  [id number]

## 2017-08-26 DIAGNOSIS — M05.9 SEROPOSITIVE RHEUMATOID ARTHRITIS (HCC): Chronic | ICD-10-CM

## 2017-08-27 RX ORDER — FOLIC ACID 1 MG/1
TABLET ORAL
Qty: 90 TAB | Refills: 2 | Status: SHIPPED | OUTPATIENT
Start: 2017-08-27 | End: 2017-11-17 | Stop reason: ALTCHOICE

## 2017-09-14 ENCOUNTER — OFFICE VISIT (OUTPATIENT)
Dept: RHEUMATOLOGY | Age: 77
End: 2017-09-14

## 2017-09-14 VITALS
HEIGHT: 63 IN | HEART RATE: 69 BPM | BODY MASS INDEX: 27.64 KG/M2 | SYSTOLIC BLOOD PRESSURE: 152 MMHG | TEMPERATURE: 97.8 F | WEIGHT: 156 LBS | DIASTOLIC BLOOD PRESSURE: 91 MMHG | RESPIRATION RATE: 18 BRPM

## 2017-09-14 DIAGNOSIS — I31.39 PERICARDIAL EFFUSION: ICD-10-CM

## 2017-09-14 DIAGNOSIS — J84.9 ILD (INTERSTITIAL LUNG DISEASE) (HCC): ICD-10-CM

## 2017-09-14 DIAGNOSIS — M05.9 SEROPOSITIVE RHEUMATOID ARTHRITIS (HCC): Primary | Chronic | ICD-10-CM

## 2017-09-14 DIAGNOSIS — M85.89 OSTEOPENIA OF MULTIPLE SITES: ICD-10-CM

## 2017-09-14 DIAGNOSIS — Z79.60 LONG-TERM USE OF IMMUNOSUPPRESSANT MEDICATION: ICD-10-CM

## 2017-09-14 RX ORDER — PREDNISONE 5 MG/1
TABLET ORAL
Qty: 70 TAB | Refills: 0 | Status: SHIPPED | OUTPATIENT
Start: 2017-09-14 | End: 2017-11-14 | Stop reason: ALTCHOICE

## 2017-09-14 RX ORDER — METHOTREXATE 2.5 MG/1
17.5 TABLET ORAL
Qty: 84 TAB | Refills: 0 | Status: SHIPPED | OUTPATIENT
Start: 2017-09-17 | End: 2017-11-17

## 2017-09-14 NOTE — PATIENT INSTRUCTIONS
Please call the Patient Care Scheduling Team [phone] to schedule your test.    PLEASE INCREASE YOUR METHOTREXATE SUNDAY TO 6 tablets (15 mg) AND THEN TO 7 tablets (17.50 mg) THE NEXT SUNDAY AND CONTINUE 7 TABLETS EVERY SUNDAY WITH DAILY FOLIC ACID 1 MG. CONTACT ME IF YOU HAVE ANY SIDE EFFECTS OR HAVE AN INFECTION    FOLLOW UP IN 2 MONTHS. I will start you on a short course of prednisone, called a prednisone taper, with 5 mg tablets.      This regimen is to be taken as follows:      - 4 tablets (20 mg), all at once, daily for 7 days   - 3 tablets (15 mg), all at once, daily for 7 days   - 2 tablets (10 mg), all at once, daily for 7 days   - 1 tablet (5 mg), daily for 7 days   - then STOP

## 2017-09-14 NOTE — PROGRESS NOTES
REASON FOR VISIT    This is a follow-up visit for Ms. Aaron Kennedy for Seropositive Rheumatoid Arthritis and Osteopenia. Inflammatory arthritis phenotype includes:  Anti-CCP positive: no  Rheumatoid factor positive: yes (low titer 19.3)  Erosive disease: no  Extra-articular manifestations include: pericardial effusion, pulmonary nodules and GGO    Immunosuppression Screening (5/27/2016): Quantiferon TB: negative  PPD:  Not performed  Hepatitis B: negative  Hepatitis C: negative    Therapy History includes:    Current DMARD therapy include: methotrexate 12.5 mg every Sunday   Prior DMARD therapy include: methotrexate    Discontinued DMARDs because of inefficacy: None  Discontinued DMARDs because of side effects: None    Immunizations:   Immunization History   Administered Date(s) Administered    Influenza High Dose Vaccine PF 10/10/2016    Pneumococcal Polysaccharide (PPSV-23) 10/10/2016    Pneumococcal Vaccine (Pcv) 10/31/2007    Tdap 04/21/2014       Active problems include:    Patient Active Problem List   Diagnosis Code    DJD (degenerative joint disease) M19.90    Cyst     Osteopenia M85.80    Hx of mammogram Z92.89    Glucose intolerance (impaired glucose tolerance) R73.02    Pericardial effusion I31.3    Seropositive rheumatoid arthritis (HCC) M05.9    Primary osteoarthritis of both hands M19.041, M19.042    Primary osteoarthritis of both knees M17.0    Trochanteric bursitis of both hips M70.61, M70.62    Chronic midline low back pain with right-sided sciatica M54.41, G89.29    Pulmonary nodules R91.8    Abnormal CT scan, chest R93.8    Long-term use of immunosuppressant medication Z79.899    ILD (interstitial lung disease) ([facility]) J84.9       HISTORY OF PRESENT ILLNESS    Ms. Aaron Kennedy returns for a follow-up visit. On her last visit, I asked her to resume methotrexate to 12.5 mg every Sunday and ordered a CT Chest which has not changed much since her previous study    Today, she feels bad. She feels stiffness lasting hours. Her left knee swells on and off and her left ankle swells as well which is worse as the day goes. Her fingers ache on and off and so does her elbow    Last blood work was done on 6/14/2017 and did not reveal any significant adverse effects, except creatinine 0.88 mg/dL and eGFR 75    Most recent inflammatory markers from 6/14/2017 revealed a ESR 9 mm/hr (previously 4, 3, 10 mm/hr) and CRP 1.0 mg/L (previously 0.8, 1.3, 0.7 mg/dL). The patient has not had any interval hospital admissions, infections, or surgeries. REVIEW OF SYSTEMS    A comprehensive review of systems was performed and pertinent results are documented in the HPI, review of systems is otherwise non-contributory. PAST MEDICAL HISTORY    She has a past medical history of Carpal tunnel syndrome; DJD (degenerative joint disease); DJD (degenerative joint disease) of knees (4/9/2013); Eczema; GERD (gastroesophageal reflux disease); Neuropathy ([address] Osteoarthritis; Osteopenia; and Post-menopausal. She also has no past medical history of Difficult intubation; Essential hypertension; Nausea & vomiting; or Unspecified adverse effect of anesthesia. FAMILY HISTORY    Her family history includes Arthritis-osteo in her brother, father, sister, and sister; Elevated Lipids in her sister; Heart Disease (age of onset: 72) in her mother; Hypertension in her brother, maternal aunt, and maternal uncle; Stroke in her maternal aunt and maternal uncle; Thyroid Disease in her sister. SOCIAL HISTORY    She reports that she quit smoking about 3 years ago. Her smoking use included Cigarettes. She smoked 0.10 packs per day. She has never used smokeless tobacco. She reports that she does not drink alcohol or use illicit drugs. MEDICATIONS    Current Outpatient Prescriptions   Medication Sig Dispense Refill    [START ON 9/17/2017] methotrexate (RHEUMATREX) 2.5 mg tablet Take 7 Tabs by mouth every Sunday for 90 days.  84 Tab 0  predniSONE (DELTASONE) 5 mg tablet 4 tabs daily for 7 days, 3 tabs for 7 days, 2 tabs for 7 days, 1 tab for 7 days 70 Tab 0    folic acid (FOLVITE) 1 mg tablet TAKE 1 TABLET BY MOUTH EVERY DAY 90 Tab 2    B.infantis-B.ani-B.long-B.bifi (PROBIOTIC 4X) 10-15 mg TbEC Take  by mouth.  vitamin E (AQUA GEMS) 400 unit capsule Take  by mouth daily.  methylsulfonylmethane (MSM) 1,000 mg tab Take  by mouth. Indications: Joint Health      OTHER Indications: Calcium and Vitamin D3 1200 mg total      ascorbic acid, vitamin C, (VITAMIN C) 500 mg tablet Take  by mouth. Takes 1 tab daily.  omeprazole (PRILOSEC) 40 mg capsule TAKE ONE CAPSULE BY MOUTH EVERY DAY 30 Cap 0    clobetasol (TEMOVATE) 0.05 % ointment APPLY TO AFFECTED AREA ON SCALP TWICE A DAY AS NEEDED  1        ALLERGIES    No Known Allergies    PHYSICAL EXAMINATION    Visit Vitals    BP (!) 152/91 (BP 1 Location: Left arm, BP Patient Position: Sitting)    Pulse 69    Temp 97.8 °F (36.6 °C)    Resp 18    Ht 5' 3\" (1.6 m)    Wt 156 lb (70.8 kg)    BMI 27.63 kg/m2     Body mass index is 27.63 kg/(m^2). General: Patient is alert, oriented x 3, not in acute distress    HEENT:   Sclerae are not injected and appear moist.  Oral mucous membranes are moist, there are no ulcers present. There is no alopecia. Neck is supple     Cardiovascular:  Heart is regular rate and rhythm, no murmurs. Chest:  Lungs are clear to auscultation bilaterally. No rhonchi, wheezes, or crackles. Extremities:  Free of clubbing, cyanosis, edema    Neurological exam:  No focal sensory deficits, muscle strength is full in upper and lower extremities     Skin exam:  There are no rashes, no alopecia, no discoid lesions, no active Raynaud's, no livedo reticularis, no periungual erythema.     Musculoskeletal exam:  A comprehensive musculoskeletal exam was performed for all joints of each upper and lower extremity and assessed for swelling, tenderness and range of motion.  Positive results are documented as below:     Bilateral Michael and Heberden nodes  Bilateral knee crepitus without effusion but active synovitis (left more than right)  Bilateral hallux valgus  Hammer toes  Left ankle swelling and pain    Joint Count 9/14/2017 6/14/2017 3/14/2017 2/14/2017 11/16/2016 8/12/2016 6/15/2016   Patient pain (0-100) 0 0 0 0 0 5 10   MHAQ 0 0 0 0 0 0 0   Left elbow - Tender 1 - - - - 1 -   Left elbow - Swollen - - - - - 1 -   Left wrist- Tender - 0 0 0 - - -   Left wrist- Swollen 1 1 1 1 1 1 1   Left 1st MCP - Tender 1 - - - - - 1   Left 1st MCP - Swollen 1 1 - - - 1 1   Left 2nd MCP - Tender - - - - - - 1   Left 2nd MCP - Swollen 1 1 - - - 1 1   Left 3rd MCP - Tender - - - - - - -   Left 3rd MCP - Swollen - 1 - - - - 1   Left 5th MCP - Swollen - 1 - - 1 - -   Left thumb IP - Swollen - 1 - - - - -   Left 2nd PIP - Swollen - 1 - - - - -   Left 3rd PIP - Swollen - 1 1 - - - -   Left knee - Tender 1 - - - - - -   Left knee - Swollen 1 - - - - - -   Right elbow - Tender 1 - - - - 1 -   Right elbow - Swollen - - - - - 1 -   Right wrist- Tender - - - - - - 1   Right wrist- Swollen 1 1 - 1 1 1 1   Right 1st MCP - Tender 1 - - - - 1 1   Right 1st MCP - Swollen 1 1 - 1 - 1 1   Right 2nd MCP - Tender - - - - - - -   Right 2nd MCP - Swollen - 1 - - 1 - 1   Right 3rd MCP - Tender - - - - - 1 -   Right 3rd MCP - Swollen - 1 - - - 1 1   Right 5th MCP - Swollen - 1 - 1 1 - -   Right 2nd PIP - Tender 1 - - - - - -   Right 2nd PIP - Swollen 1 1 - 1 - - -   Right 3rd PIP - Tender - - - - - - -   Right 3rd PIP - Swollen 1 1 1 - - - -   Right 4th PIP - Tender - - - - - - -   Right 5th PIP - Tender - - - - - - -   Right knee - Swollen 1 - - - - - -   Tender Joint Count (Total) 6 0 0 0 - - -   Swollen Joint Count (Total) 9 15 3 5 - - -   Physician Assessment (0-10) - 2 1 2 2 2 20   Patient Assessment (0-10) 0 0 0.5 0.5 1 2 25   CDAI Total (calculated) - 17 4.5 7.5 - - -     DATA REVIEW    Laboratory The following laboratory results were reviewed and discussed with the patient:    Office Visit on 06/14/2017   Component Date Value    WBC 06/14/2017 7.9     RBC 06/14/2017 4.71     HGB 06/14/2017 14.8     HCT 06/14/2017 45.1     MCV 06/14/2017 96     MCH 06/14/2017 31.4     MCHC 06/14/2017 32.8     RDW 06/14/2017 14.0     PLATELET 80/32/0109 374     NEUTROPHILS 06/14/2017 60     Lymphocytes 06/14/2017 28     MONOCYTES 06/14/2017 9     EOSINOPHILS 06/14/2017 2     BASOPHILS 06/14/2017 1     ABS. NEUTROPHILS 06/14/2017 4.8     Abs Lymphocytes 06/14/2017 2.2     ABS. MONOCYTES 06/14/2017 0.7     ABS. EOSINOPHILS 06/14/2017 0.2     ABS. BASOPHILS 06/14/2017 0.0     IMMATURE GRANULOCYTES 06/14/2017 0     ABS. IMM. GRANS. 06/14/2017 0.0     Glucose 06/14/2017 84     BUN 06/14/2017 17     Creatinine 06/14/2017 0.88     GFR est non-AA 06/14/2017 65     GFR est AA 06/14/2017 75     BUN/Creatinine ratio 06/14/2017 19     Sodium 06/14/2017 143     Potassium 06/14/2017 4.7     Chloride 06/14/2017 103     CO2 06/14/2017 22     Calcium 06/14/2017 9.4     Protein, total 06/14/2017 6.7     Albumin 06/14/2017 3.9     GLOBULIN, TOTAL 06/14/2017 2.8     A-G Ratio 06/14/2017 1.4     Bilirubin, total 06/14/2017 0.7     Alk. phosphatase 06/14/2017 66     AST (SGOT) 06/14/2017 26     ALT (SGPT) 06/14/2017 22     C-Reactive Protein, Qt 06/14/2017 1.0     Sed rate (ESR) 06/14/2017 9      Imaging    Musculoskeletal Ultrasound    Ultrasound of the right wrist. Indication: joint swelling. (5/27/2016)  Using a Playboox e with 18 Mhz probe, limited views of the right wrist were obtained. This revealed hypoechoic collection without doppler within the midcarpal joint space. The tendons were normal. Bony contours were regular without erosions seen. There were no soft tissue masses noted. Impression: synovial hypertrophy    Radiographs    Bilateral Hand 5/27/2016:  Moderate bilateral triscaphe joint osteoarthritis. Moderate left and mild right first MC joint osteoarthritis. Moderate bilateral first MCP joint osteoarthritis. Multifocal IP joint osteoarthritis is bilateral. There are central erosions in the right first through fourth DIP joints and left third and fifth MP joints. Subtle marginal erosions in the left second DIP joint. No fracture or dislocation on plain film. Alignment is within normal limits. Bone mineralization is decreased. No soft tissue calcification. Bilateral Shoulder 5/27/2016: LEFT: demonstrate mild osteopenia. No acute fracture or dislocation. Age-appropriate AC joint osteoarthritis with marginal osteophytes. Glenohumeral joint is within normal limits for age. No evidence of erosion. RIGHT: demonstrate osteopenia. No acute fracture or dislocation. AC joint osteoarthritis includes marginal osteophytes. Glenohumeral joint osteoarthritis is age-appropriate. Cortical irregularity of the greater tuberosity of the humerus indicates underlying rotator cuff pathology    Left Knee 12/29/2015: no fracture. Joint effusion and prepatellar soft tissue contusion versus bursitis. Increased osteoarthritis. Lumbar Spine 12/29/2015: stable dextroconvex scoliosis without evidence of acute fracture or subluxation. Significant multilevel degenerative disc disease is again noted from L2-S1. The patient is status post laminectomy from L1 through to the sacrum. Multilevel facet degenerative arthritis again noted. Left Knee 8/03/2010: sclerosis and deformity of the lateral tibial plateau with associated lateral compartment osteoarthritis is suggestive of old injury. No acute fracture is seen. There is moderate to severe patellofemoral     CT Imaging    CT Chest without contrast 6/20/2017: The style windows demonstrate no adenopathy. There continues to be a moderate  pericardial effusion which is unchanged when compared with 7/22/2016. No thyroid nodules. No axillary adenopathy. No adrenal lesions. There is a small moderate hiatal hernia. Review of bone windows demonstrates no destructive lesions. Lung windows reveal a stable small solid nodule in the right middle lobe laterally. The ill-defined semisolid groundglass opacity anteriorly in left lower lobe is unchanged when compared with the CT of 3/12/2015. This should be evaluated on  continue follow-up. Follow-up exam in one year is suggested. No new nodules are noted. The high-resolution images demonstrate mild changes of centrilobular emphysema. There is no evidence for fibrosis or bronchiectasis. No significant interstitial lung disease. CT Chest without contrast 7/22/2016: Small lingular and right middle lobe nodules and left lower lobe groundglass opacity all unchanged. This represents a 16 month follow-up examination. The small middle lobe nodule and groundglass opacity in the left lower lobe are essentially unchanged in comparison with 3/9/2011. CT Chest with contrast 1/27/2016: nodules in both lungs including a soft more groundglass appearance nodule in the left lower lobe are stable when compared to the previous examination. No new acute finding or new nodule. Small pericardial effusion. Hiatal hernia. CT Chest without contrast 6/15/2015: stable semisolid nodule anteriorly in left lower lobe. The other nodules are stable. Decreasing pericardial effusion. Stable adenopathy. She underwent a fine needle biopsy which showed abundant benign and reactive bronchial epithelial cells and no malignancy. CT Chest without contrast 5/13/2015: a suspicious semisolid nodule in the anterior segment of the left lower lobe just posterior to the fissure with some retraction of the fissure. This measures 1.2 x 1 x 1.4 cm. PET/CT negative for an 4/10/2015. Moderate stable pericardial effusion. Stable enlarged lymph nodes as described. Several other small pulmonary nodules also stable.     PET/CT Scan 4/10/2015: showed the vague semisolid nodule in the left lower lobe demonstrates no increased metabolic activity on PET suggesting benign etiology but is nonspecific and neoplasm is not entirely excluded. There are 2 lymph nodes in the right neck which are normal in size and demonstrate slight increased metabolic activity nonspecific most likely reactive. CT Heart without contrast with calcium 3/12/2015: total calcium score of 0. A low score suggests a low likelihood of coronary disease but does not exclude the possibility of significant coronary artery narrowing. 3 mm right middle lobe lung nodule unchanged. Pericardial effusion. Hiatal hernia. A CT chest without contrast showed a suspicious semisolid nodule in the anterior segment of the left lower lobe just posterior to the fissure with some retraction of the fissure. This measures 1.2 x 1 x 1.4 cm. This could represent a small bronchogenic carcinoma. Moderate pericardial effusion clinical correlation is recommended. 2 enlarged lymph nodes as described. Several other small pulmonary nodules can be evaluated on followup 23X    CT Heart without contrast with calcium 3/09/2011: total calcium score of 0. A low score suggests a low likelihood of coronary disease but does not exclude the possibility of significant coronary artery narrowing. Small pericardial effusion. 3 mm pulmonary nodule right middle lobe. 7 mm density left lower lobe. MR Imaging    None    DXA    DXA 5/21/2015: lumbar spine L1-L4 T score 1.7 (BMD 1.389 g/cm2), left femoral neck T score: -2.2 (0.728 g/cm2), right femoral neck  T score: -1.4 (0.825 g/cm2), total hip T score: -2.2 (0.728 g/cm2). Echocardiogram    Echocardiogram 6/03/2015: LEFT VENTRICLE: Size was normal. Systolic function was normal. Ejection fraction was estimated in the range of 60 % to 65 %. There were no regional wall motion abnormalities. Wall thickness was mildly increased.  DOPPLER: Doppler parameters were consistent with abnormal left ventricular relaxation (grade 1 diastolic dysfunction). RIGHT VENTRICLE: The size was normal. Systolic function was normal. LEFT ATRIUM: Size was normal. LA volume index was 23 ml/m squared. ATRIAL SEPTUM: There was a small atrial septal aneurysm. There was no evidence of shunt on color flow imaging. RIGHT ATRIUM: Size was normal. MITRAL VALVE: Normal valve structure. DOPPLER: There was trivial regurgitation. AORTIC VALVE: Normal valve structure. DOPPLER: Transaortic velocity was within the normal range. There was no stenosis. There was no significant regurgitation. TRICUSPID VALVE: Normal valve structure. DOPPLER: There was mild regurgitation. Right atrial pressure estimate: 5 mmHg. Pulmonary artery systolic pressure: 25 mmHg. There was no pulmonary hypertension. PULMONIC VALVE: Not well visualized. DOPPLER: The transpulmonic velocity was within the normal range. There was trivial regurgitation. AORTA: The root exhibited normal size. SYSTEMIC VEINS: IVC: The inferior vena cava was normal in size. PERICARDIUM: A small pericardial effusion was identified along the right atrial free wall. There was no evidence of hemodynamic compromise. PFT    PFT 6/20/2017: FVC of 1.78 (>80%), FEV1 of 1.31 (85%), FEV1/FVC of 73.82% and a DLCO of 19.65 (116%). PATHOLOGY    Fine Need Aspirate of LLL 6/15/2015: Abundant benign and reactive bronchial epithelial cells. No cells diagnostic for malignancy    PROCEDURE     Bilateral Trochanteric Bursitis Kenalog 40 mg. (02/14/17)     Bilateral Knee Kenalog 40 mg. (11/16/2016)    ASSESSMENT AND PLAN    This is a follow-up visit for Ms. Lily Carballo. 1) Seropositive Rheumatoid Arthritis with Interstitial Lung Disease. Today, her CDAI N/A (previously 17, 4.5, 7.5, 8, 16, 15.5, 19), with 6 tender and 9 swollen. She has been on methotrexate 12.5 mg every Sunday since her last visit. She has more active disease, I asked her to increase her methotrexate incrementally over the next two doses to 17.5 mg weekly.  She has CKD stage 2, so I will reassess on follow up. I reviewed her CT with her which did not show active disease. Labs today. I will start her on a short course of prednisone, called a prednisone taper, with 5 mg tablets. This regimen is to be taken as follows: 4 tabs (20 mg) for 7 days, 3 tabs (15 mg) for 7 days, 2 tabs (10 mg) for 7 days and then 1 tab (5 mg) for 7 days,    2) Long Term Use of Immunosuppressants. The patient remains on immunomodulatory medications (methotrexate) and requires frequent toxicity monitoring by blood work. Respective labs were ordered (CBC and CMP). 3) Bilateral Hands and Knee Osteoarthritis. This was not an active issue today. 4) Bilateral Trochanteric Bursitis. This was not an active issue today. 5) Osteopenia. Her DXA in 5/21/2015 showed lumbar spine L1-L4 T score 1.7 (BMD 1.389 g/cm2), left femoral neck T score: -2.2 (0.728 g/cm2), right femoral neck T score: -1.4 (0.825 g/cm2), total hip T score: -2.2 (0.728 g/cm2). I ordered a DXA today. 6) Pericardial Effusion. This is chronic and asymptomatic. 7) Pulmonary Nodule and Ground Glass Opacities. She endorses of a chrThis has been monitored serially without malignancy seen on tissue sampling. It may be due to #1. CT Chest and PFTs did not show active disease or pulmonary compromise. 8) Chronic Lower Back Pain. She has a spinal cord stimulator. This was not an active issue today. 9) CKD Stage 2. Her creatinine was 0.88 mg/dL and eGFR 75. The patient voiced understanding of the aforementioned assessment and plan. Summary of plan was provided in the After Visit Summary patient instructions.      TODAY'S ORDERS    Orders Placed This Encounter    DEXA BONE DENSITY STUDY AXIAL    CBC WITH AUTOMATED DIFF    METABOLIC PANEL, COMPREHENSIVE    C REACTIVE PROTEIN, QT    SED RATE (ESR)    methotrexate (RHEUMATREX) 2.5 mg tablet    predniSONE (DELTASONE) 5 mg tablet     Future Appointments  Date Time Provider Franny Willis 11/14/2017 10:20 AM Lydia Slade MD [address]   6[facility]20/2018 10:00 [facility] [place] [facility] [address]       Brittney Mcmahon MD, [facility]    Adult Rheumatology   Musculoskeletal Ultrasound Certified  [address] [place] [address], [address] [place] [address] Road   Phone [phone]  Fax [phone]

## 2017-09-14 NOTE — MR AVS SNAPSHOT
Visit Information Date & Time Provider Department Dept. Phone Encounter #  
 9/14/2017 10:00 AM Kelly Pimentel, [address] [facility] [id number] Follow-up Instructions Return in about 2 months (around 11/14/2017). Upcoming Health Maintenance Date Due INFLUENZA AGE 9 TO ADULT 8/1/2017 COLONOSCOPY 2/7/2018 MEDICARE YEARLY EXAM 4/25/2018 GLAUCOMA SCREENING Q2Y 5/9/2018 DTaP/Tdap/Td series (2 - Td) 4/21/2024 Allergies as of 9/14/2017  Review Complete On: 9/14/2017 By: Eron Hampton RN No Known Allergies Current Immunizations  Reviewed on 11/16/2016 Name Date Influenza High Dose Vaccine PF 10/10/2016 Pneumococcal Polysaccharide (PPSV-23) 10/10/2016 Pneumococcal Vaccine (Pcv) 10/31/2007 Tdap 4/21/2014 Not reviewed this visit You Were Diagnosed With   
  
 Codes Comments Seropositive rheumatoid arthritis ([facility]    -  Primary ICD-10-CM: M05.9 ICD-9-CM: 714.0 Long-term use of immunosuppressant medication     ICD-10-CM: Z79.899 ICD-9-CM: V58.69 Pericardial effusion     ICD-10-CM: I31.3 ICD-9-CM: 423.9 ILD (interstitial lung disease) ([facility].)     ICD-10-CM: J84.9 ICD-9-CM: 062 Osteopenia of multiple sites     ICD-10-CM: M85.89 ICD-9-CM: 733.90 Vitals BP Pulse Temp Resp Height(growth percentile) Weight(growth percentile) (!) 152/91 (BP 1 Location: Left arm, BP Patient Position: Sitting) 69 97.8 °F (36.6 °C) 18 5' 3\" (1.6 m) 156 lb (70.8 kg) BMI OB Status Smoking Status 27.63 kg/m2 Hysterectomy Former Smoker BMI and BSA Data Body Mass Index Body Surface Area  
 27.63 kg/m 2 1.77 m 2 Preferred Pharmacy Pharmacy Name Phone [id number] [address] [phone] Your Updated Medication List  
  
   
This list is accurate as of: 9/14/17 10:25 AM.  Always use your most recent med list.  
  
  
  
  
 clobetasol 0.05 % ointment Commonly known as:  TEMOVATE  
APPLY TO AFFECTED AREA ON SCALP TWICE A DAY AS NEEDED  
  
 folic acid 1 mg tablet Commonly known as:  FOLVITE  
TAKE 1 TABLET BY MOUTH EVERY DAY  
  
 methotrexate 2.5 mg tablet Commonly known as:  Christian January Take 7 Tabs by mouth every  for 90 days. Start taking on:  2017 MSM 1,000 mg Tab Generic drug:  methylsulfonylmethane Take  by mouth. Indications: Joint Health  
  
 omeprazole 40 mg capsule Commonly known as:  PRILOSEC  
TAKE ONE CAPSULE BY MOUTH EVERY DAY  
  
 OTHER Indications: Calcium and Vitamin D3 1200 mg total  
  
 predniSONE 5 mg tablet Commonly known as:  DELTASONE  
4 tabs daily for 7 days, 3 tabs for 7 days, 2 tabs for 7 days, 1 tab for 7 days PROBIOTIC 4X 10-15 mg Tbec Generic drug:  B.infantis-B.ani-B.long-B.bifi Take  by mouth. VITAMIN C 500 mg tablet Generic drug:  ascorbic acid (vitamin C) Take  by mouth. Takes 1 tab daily. vitamin E 400 unit capsule Commonly known as:  [address] Take  by mouth daily. Prescriptions Sent to Pharmacy Refills  
 methotrexate (RHEUMATREX) 2.5 mg tablet 0 Starting on: 2017 Sig: Take 7 Tabs by mouth every  for 90 days. Class: Normal  
 Pharmacy: [id number] [address] [facility] [phone] Route: Oral  
 predniSONE (DELTASONE) 5 mg tablet 0 Si tabs daily for 7 days, 3 tabs for 7 days, 2 tabs for 7 days, 1 tab for 7 days Class: Normal  
 Pharmacy: [id number] [address] [facility] #: [phone] We Performed the Following C REACTIVE PROTEIN, QT [79026 CPT(R)] CBC WITH AUTOMATED DIFF [55188 CPT(R)] METABOLIC PANEL, COMPREHENSIVE [63394 CPT(R)] SED RATE (ESR) [id number] CPT(R)] Follow-up Instructions  Return in about 2 months (around 11/14/2017). To-Do List   
 09/14/2017 Imaging:  DEXA BONE DENSITY STUDY AXIAL   
  
 06/20/2018 10:00 AM  
  Appointment with [facility] CT ER 2 at [facility] RAD [address] ([phone]) NON-CONTRAST STUDY: 1. Bring any non [facility] facility films/images pertaining to the area of interest with you on the day of appointment. 2. Check in at registration at least 30 minutes before appt time unless you were instructed to do otherwise. 3. If you have to drink oral contrast please pick it up any weekday prior to your appointment, if you cannot please check in 2 hrs  before appt time. Patient Instructions Please call the Patient Care Scheduling Team [phone] to schedule your test. 
 
PLEASE INCREASE YOUR METHOTREXATE SUNDAY TO 6 tablets (15 mg) AND THEN TO 7 tablets (17.50 mg) THE NEXT SUNDAY AND CONTINUE 7 TABLETS EVERY SUNDAY WITH DAILY FOLIC ACID 1 MG. CONTACT ME IF YOU HAVE ANY SIDE EFFECTS OR HAVE AN INFECTION 
 
FOLLOW UP IN 2 MONTHS. I will start you on a short course of prednisone, called a prednisone taper, with 5 mg tablets. This regimen is to be taken as follows:  
 
 - 4 tablets (20 mg), all at once, daily for 7 days - 3 tablets (15 mg), all at once, daily for 7 days - 2 tablets (10 mg), all at once, daily for 7 days - 1 tablet (5 mg), daily for 7 days - then STOP Introducing [facility]! [facility] introduces docBeat patient portal. Now you can access parts of your medical record, email your doctor's office, and request medication refills online. 1. In your internet browser, go to [email or web address] 2. Click on the First Time User? Click Here link in the Sign In box. You will see the New Member Sign Up page. 3. Enter your docBeat Access Code exactly as it appears below. You will not need to use this code after youve completed the sign-up process.  If you do not sign up before the expiration date, you must request a new code. 
 
· Swipe.to Access Code: [email or web address] Expires: 12/12/2017  1:59 PM 
 
4. Enter the last four digits of your Social Security Number (xxxx) and Date of Birth (mm/dd/yyyy) as indicated and click Submit. You will be taken to the next sign-up page. 5. Create a Swipe.to ID. This will be your Swipe.to login ID and cannot be changed, so think of one that is secure and easy to remember. 6. Create a Swipe.to password. You can change your password at any time. 7. Enter your Password Reset Question and Answer. This can be used at a later time if you forget your password. 8. Enter your e-mail address. You will receive e-mail notification when new information is available in [address] 9. Click Sign Up. You can now view and download portions of your medical record. 10. Click the Download Summary menu link to download a portable copy of your medical information. If you have questions, please visit the Frequently Asked Questions section of the Swipe.to website. Remember, Swipe.to is NOT to be used for urgent needs. For medical emergencies, dial 911. Now available from your iPhone and Android! Please provide this summary of care documentation to your next provider. Your primary care clinician is listed as Gabriella FLORES. If you have any questions after today's visit, please call [phone].

## 2017-09-15 DIAGNOSIS — R92.8 ABNORMAL MAMMOGRAM: ICD-10-CM

## 2017-09-15 LAB
ALBUMIN SERPL-MCNC: 4.1 G/DL (ref 3.5–4.8)
ALBUMIN/GLOB SERPL: 1.5 {RATIO} (ref 1.2–2.2)
ALP SERPL-CCNC: 67 IU/L (ref 39–117)
ALT SERPL-CCNC: 22 IU/L (ref 0–32)
AST SERPL-CCNC: 27 IU/L (ref 0–40)
BASOPHILS # BLD AUTO: 0 X10E3/UL (ref 0–0.2)
BASOPHILS NFR BLD AUTO: 1 %
BILIRUB SERPL-MCNC: 0.7 MG/DL (ref 0–1.2)
BUN SERPL-MCNC: 16 MG/DL (ref 8–27)
BUN/CREAT SERPL: 16 (ref 12–28)
CALCIUM SERPL-MCNC: 9.6 MG/DL (ref 8.7–10.3)
CHLORIDE SERPL-SCNC: 103 MMOL/L (ref 96–106)
CO2 SERPL-SCNC: 23 MMOL/L (ref 18–29)
CREAT SERPL-MCNC: 1.02 MG/DL (ref 0.57–1)
CRP SERPL-MCNC: 0.9 MG/L (ref 0–4.9)
EOSINOPHIL # BLD AUTO: 0.1 X10E3/UL (ref 0–0.4)
EOSINOPHIL NFR BLD AUTO: 2 %
ERYTHROCYTE [DISTWIDTH] IN BLOOD BY AUTOMATED COUNT: 14.9 % (ref 12.3–15.4)
ERYTHROCYTE [SEDIMENTATION RATE] IN BLOOD BY WESTERGREN METHOD: 20 MM/HR (ref 0–40)
GLOBULIN SER CALC-MCNC: 2.8 G/DL (ref 1.5–4.5)
GLUCOSE SERPL-MCNC: 90 MG/DL (ref 65–99)
HCT VFR BLD AUTO: 44.5 % (ref 34–46.6)
HGB BLD-MCNC: 14.5 G/DL (ref 11.1–15.9)
IMM GRANULOCYTES # BLD: 0 X10E3/UL (ref 0–0.1)
IMM GRANULOCYTES NFR BLD: 0 %
LYMPHOCYTES # BLD AUTO: 1.6 X10E3/UL (ref 0.7–3.1)
LYMPHOCYTES NFR BLD AUTO: 26 %
MCH RBC QN AUTO: 30.9 PG (ref 26.6–33)
MCHC RBC AUTO-ENTMCNC: 32.6 G/DL (ref 31.5–35.7)
MCV RBC AUTO: 95 FL (ref 79–97)
MONOCYTES # BLD AUTO: 0.4 X10E3/UL (ref 0.1–0.9)
MONOCYTES NFR BLD AUTO: 7 %
NEUTROPHILS # BLD AUTO: 4 X10E3/UL (ref 1.4–7)
NEUTROPHILS NFR BLD AUTO: 64 %
PLATELET # BLD AUTO: 224 X10E3/UL (ref 150–379)
POTASSIUM SERPL-SCNC: 4.7 MMOL/L (ref 3.5–5.2)
PROT SERPL-MCNC: 6.9 G/DL (ref 6–8.5)
RBC # BLD AUTO: 4.7 X10E6/UL (ref 3.77–5.28)
SODIUM SERPL-SCNC: 143 MMOL/L (ref 134–144)
WBC # BLD AUTO: 6.3 X10E3/UL (ref 3.4–10.8)

## 2017-09-15 NOTE — PROGRESS NOTES
The results were reviewed and a letter was sent. Slightly worsening renal function. Otherwise, normal labs.

## 2017-09-18 DIAGNOSIS — K21.9 GASTROESOPHAGEAL REFLUX DISEASE WITHOUT ESOPHAGITIS: ICD-10-CM

## 2017-09-18 RX ORDER — OMEPRAZOLE 40 MG/1
CAPSULE, DELAYED RELEASE ORAL
Qty: 30 CAP | Refills: 0 | Status: SHIPPED | OUTPATIENT
Start: 2017-09-18 | End: 2017-10-23 | Stop reason: SDUPTHER

## 2017-10-09 ENCOUNTER — DOCUMENTATION ONLY (OUTPATIENT)
Dept: RHEUMATOLOGY | Age: 77
End: 2017-10-09

## 2017-10-09 NOTE — PROGRESS NOTES
DXA    DXA 10/06/2017: (excluded None) showed lumbar spine L1-L4 T score 1.2 (BMD 1.324 g/cm2), left femoral neck T score: -2.1 (0.748 g/cm2), left total hip T score: N/A (N/A g/cm2), right femoral neck T score: -1.4 (0.834 g/cm2), right total hip T score: N/A (N/A g/cm2), and distal one third left radius T score N/A (BMD N/A g/cm2). FRAX score N/A % probability in 10 years for major osteoporotic fracture and N/A % 10 year probability of hip fracture.

## 2017-10-23 DIAGNOSIS — K21.9 GASTROESOPHAGEAL REFLUX DISEASE WITHOUT ESOPHAGITIS: ICD-10-CM

## 2017-10-23 RX ORDER — OMEPRAZOLE 40 MG/1
CAPSULE, DELAYED RELEASE ORAL
Qty: 30 CAP | Refills: 0 | Status: SHIPPED | OUTPATIENT
Start: 2017-10-23 | End: 2017-11-14 | Stop reason: SDUPTHER

## 2017-10-25 ENCOUNTER — DOCUMENTATION ONLY (OUTPATIENT)
Dept: RHEUMATOLOGY | Age: 77
End: 2017-10-25

## 2017-10-25 NOTE — PROGRESS NOTES
DXA    DXA  10/06/2017: (excluded None) showed lumbar spine L1-L4 T score 1.2 (BMD 1.324 g/cm2), left femoral neck T score: -2.3 (0.721 g/cm2), left total hip T score: -2.1 (0.748 g/cm2), right femoral neck T score: -2.1 (0.749g/cm2), right total hip T score: -1.4 (0.834 g/cm2), and distal one third left radius T score N/A (BMD N/A g/cm2). FRAX score N/A % probability in 10 years for major osteoporotic fracture and N/A % 10 year probability of hip fracture.

## 2017-11-01 DIAGNOSIS — M85.89 OSTEOPENIA OF MULTIPLE SITES: ICD-10-CM

## 2017-11-14 ENCOUNTER — OFFICE VISIT (OUTPATIENT)
Dept: RHEUMATOLOGY | Age: 77
End: 2017-11-14

## 2017-11-14 VITALS
HEIGHT: 63 IN | SYSTOLIC BLOOD PRESSURE: 164 MMHG | RESPIRATION RATE: 18 BRPM | TEMPERATURE: 97.8 F | WEIGHT: 165 LBS | DIASTOLIC BLOOD PRESSURE: 96 MMHG | BODY MASS INDEX: 29.23 KG/M2 | HEART RATE: 75 BPM

## 2017-11-14 DIAGNOSIS — M19.042 PRIMARY OSTEOARTHRITIS OF BOTH HANDS: ICD-10-CM

## 2017-11-14 DIAGNOSIS — M17.0 PRIMARY OSTEOARTHRITIS OF BOTH KNEES: ICD-10-CM

## 2017-11-14 DIAGNOSIS — N18.2 CKD (CHRONIC KIDNEY DISEASE) STAGE 2, GFR 60-89 ML/MIN: ICD-10-CM

## 2017-11-14 DIAGNOSIS — M19.041 PRIMARY OSTEOARTHRITIS OF BOTH HANDS: ICD-10-CM

## 2017-11-14 DIAGNOSIS — R91.8 PULMONARY NODULES: ICD-10-CM

## 2017-11-14 DIAGNOSIS — G89.29 CHRONIC RIGHT SHOULDER PAIN: ICD-10-CM

## 2017-11-14 DIAGNOSIS — M05.9 SEROPOSITIVE RHEUMATOID ARTHRITIS (HCC): Primary | Chronic | ICD-10-CM

## 2017-11-14 DIAGNOSIS — I31.39 PERICARDIAL EFFUSION: ICD-10-CM

## 2017-11-14 DIAGNOSIS — M85.89 OSTEOPENIA OF MULTIPLE SITES: ICD-10-CM

## 2017-11-14 DIAGNOSIS — R93.89 ABNORMAL CT SCAN, CHEST: ICD-10-CM

## 2017-11-14 DIAGNOSIS — M25.511 CHRONIC RIGHT SHOULDER PAIN: ICD-10-CM

## 2017-11-14 DIAGNOSIS — Z79.60 LONG-TERM USE OF IMMUNOSUPPRESSANT MEDICATION: ICD-10-CM

## 2017-11-14 RX ORDER — TRIAMCINOLONE ACETONIDE 40 MG/ML
40 INJECTION, SUSPENSION INTRA-ARTICULAR; INTRAMUSCULAR ONCE
Qty: 1 ML | Refills: 0
Start: 2017-11-14 | End: 2017-11-14

## 2017-11-14 RX ORDER — LIDOCAINE HYDROCHLORIDE 10 MG/ML
1 INJECTION, SOLUTION EPIDURAL; INFILTRATION; INTRACAUDAL; PERINEURAL ONCE
Qty: 1 ML | Refills: 0
Start: 2017-11-14 | End: 2017-11-14

## 2017-11-14 NOTE — MR AVS SNAPSHOT
Visit Information Date & Time Provider Department Dept. Phone Encounter #  
 11/14/2017 10:20 AM Jaun Santana MD [address] [place] [address] [id number] Follow-up Instructions Return in about 3 months (around 2/14/2018). Upcoming Health Maintenance Date Due Influenza Age 5 to Adult 8/1/2017 COLONOSCOPY 2/7/2018 MEDICARE YEARLY EXAM 4/25/2018 GLAUCOMA SCREENING Q2Y 5/9/2018 DTaP/Tdap/Td series (2 - Td) 4/21/2024 Allergies as of 11/14/2017  Review Complete On: 11/14/2017 By: Jaun Santana MD  
 No Known Allergies Current Immunizations  Reviewed on 11/16/2016 Name Date Influenza High Dose Vaccine PF 10/10/2016 Pneumococcal Polysaccharide (PPSV-23) 10/10/2016 Pneumococcal Vaccine (Pcv) 10/31/2007 Tdap 4/21/2014 Zoster Vaccine, Live 10/10/2016 Not reviewed this visit You Were Diagnosed With   
  
 Codes Comments Seropositive rheumatoid arthritis ([facility]    -  Primary ICD-10-CM: M05.9 ICD-9-CM: 714.0 Chronic right shoulder pain     ICD-10-CM: M25.511, G89.29 ICD-9-CM: 719.41, 338.29 Long-term use of immunosuppressant medication     ICD-10-CM: Z79.899 ICD-9-CM: V58.69 CKD (chronic kidney disease) stage 2, GFR 60-89 ml/min     ICD-10-CM: N18.2 ICD-9-CM: 529. 2 Vitals BP Pulse Temp Resp Height(growth percentile) Weight(growth percentile) (!) 164/96 (BP 1 Location: Left arm, BP Patient Position: Sitting) 75 97.8 °F (36.6 °C) 18 5' 3\" (1.6 m) 165 lb (74.8 kg) BMI OB Status Smoking Status 29.23 kg/m2 Hysterectomy Former Smoker BMI and BSA Data Body Mass Index Body Surface Area  
 29.23 kg/m 2 1.82 m 2 Preferred Pharmacy Pharmacy Name Phone [id number] [place], [address] [facility] [phone] Your Updated Medication List  
  
   
This list is accurate as of: 11/14/17 10:48 AM. Always use your most recent med list.  
  
  
  
  
 folic acid 1 mg tablet Commonly known as:  FOLVITE  
TAKE 1 TABLET BY MOUTH EVERY DAY  
  
 lidocaine (PF) 10 mg/mL (1 %) injection Commonly known as:  XYLOCAINE  
1 mL by Intra artICUlar route once for 1 dose. methotrexate 2.5 mg tablet Commonly known as:  Kurt Koenig Take 7 Tabs by mouth every Sunday for 90 days. MSM 1,000 mg Tab Generic drug:  methylsulfonylmethane Take  by mouth. Indications: Joint Health  
  
 omeprazole 40 mg capsule Commonly known as:  PRILOSEC  
TAKE ONE CAPSULE BY MOUTH EVERY DAY  
  
 OTHER Indications: Calcium and Vitamin D3 1200 mg total  
  
 * tofacitinib 11 mg Tb24 Commonly known as:  XELJANZ XR Take 11 mg by mouth daily for 30 days. Indications: Rheumatoid Arthritis * tofacitinib 11 mg Tb24 Commonly known as:  XELJANZ XR Take 11 mg by mouth daily for 30 days. Indications: Rheumatoid Arthritis  
  
 triamcinolone acetonide 40 mg/mL injection Commonly known as:  KENALOG  
1 mL by Intra artICUlar route once for 1 dose. VITAMIN C 500 mg tablet Generic drug:  ascorbic acid (vitamin C) Take  by mouth. Takes 1 tab daily. vitamin E 400 unit capsule Commonly known as:  [address] Take  by mouth daily. * Notice: This list has 2 medication(s) that are the same as other medications prescribed for you. Read the directions carefully, and ask your doctor or other care provider to review them with you. Prescriptions Sent to Pharmacy Refills  
 tofacitinib (XELJANZ XR) 11 mg Tb24 11 Sig: Take 11 mg by mouth daily for 30 days. Indications: Rheumatoid Arthritis Class: Normal  
 Pharmacy: [facility]/pharmacy #[id number] #: [phone] Route: Oral  
  
We Performed the Following C REACTIVE PROTEIN, QT [19465 CPT(R)] CBC WITH AUTOMATED DIFF [96321 CPT(R)]  CHRONIC HEPATITIS PANEL [[id number] Custom] METABOLIC PANEL, COMPREHENSIVE [70512 CPT(R)] CT DRAIN/INJECT LARGE JOINT/BURSA [id number] CPT(R)] QUANTIFERON TB GOLD [[id number] Custom] SED RATE (ESR) [id number] CPT(R)] TRIAMCINOLONE ACETONIDE INJ [ [facility]] Follow-up Instructions Return in about 3 months (around 2/14/2018). To-Do List   
 06/20/2018 10:00 AM  
  Appointment with [facility] CT ER 2 at [facility] RAD [address] ([phone]) NON-CONTRAST STUDY: 1. [facility] films/images pertaining to the area of interest with you on the day of appointment. 2. Check in at registration at least 30 minutes before appt time unless you were instructed to do otherwise. 3. If you have to drink oral contrast please pick it up any weekday prior to your appointment, if you cannot please check in 2 hrs  before appt time. Patient Instructions Please continue to rest the joint for a few more days before resuming regular activities. It may be more painful for the first 1-2 days. Watch for fever, or increased swelling or persistent pain in the joint. Call or return to clinic prn if such symptoms occur or there is failure to improve as anticipated.  
  
CONTINUE METHOTREXATE Tofacitinib (By mouth) Tofacitinib (toe-fa-SYE-ti-nib) Treats rheumatoid arthritis. Brand Name(s): Rafa Alexander XR There may be other brand names for this medicine. When This Medicine Should Not Be Used: This medicine is not right for everyone. Do not use it if you had an allergic reaction to tofacitinib. How to Use This Medicine:  
Tablet, Long Acting Tablet · Your doctor will tell you how much medicine to use. Do not use more than directed. · Swallow the extended-release tablet whole. Do not crush, break, or chew it. · If you take the extended-release tablet, part of the tablet may pass into your stools. This is normal and is nothing to worry about. · This medicine should come with a Medication Guide.  Ask your pharmacist for a copy if you do not have one. · Missed dose: Take a dose as soon as you remember. If it is almost time for your next dose, wait until then and take a regular dose. Do not take extra medicine to make up for a missed dose. · Store the medicine in a closed container at room temperature, away from heat, moisture, and direct light. Drugs and Foods to Avoid: Ask your doctor or pharmacist before using any other medicine, including over-the-counter medicines, vitamins, and herbal products. · Some medicines can affect how tofacitinib works. Tell your doctor if you are using any of the following: ¨ Medicines that weaken your immune system (including azathioprine, cyclosporine, methotrexate, tacrolimus) ¨ Medicine to treat infection (including fluconazole, ketoconazole, rifampin) ¨ NSAID medicine (including aspirin, diclofenac, ibuprofen, naproxen) ¨ Steroid medicine (including dexamethasone, hydrocortisone, methylprednisolone, prednisolone, prednisone) · This medicine may interfere with vaccines. Ask your doctor before you get a flu shot or any other vaccines. Warnings While Using This Medicine: · It is not safe to take this medicine during pregnancy. It could harm an unborn baby. Tell your doctor right away if you become pregnant. Use an effective form of birth control during treatment with this medicine and for at least 4 weeks after your last dose. · Do not breastfeed while you are using this medicine. · Tell your doctor if you have kidney disease, liver disease (including hepatitis B or C), lung disease (including interstitial lung disease), HIV, cancer or a history of cancer, blood problems, diabetes, high cholesterol, stomach or bowel problems (including blockage, diverticulitis, ulcers), or a history of tuberculosis. Also tell your doctor if you have a current infection or an infection that keeps coming back.  
· This medicine may cause the following problems: 
¨ Increased risk for serious infections, including herpes infection or shingles ¨ Increased risk for certain cancers, including nonmelanoma skin cancer or lymphoma ¨ Stomach or bowel perforation (tear or hole) ¨ High cholesterol in the blood · Talk with your doctor before using this medicine if you plan to have children. Some women who use this medicine have become infertile (unable to have children). · This medicine may make you bleed, bruise, or get infections more easily. Take precautions to prevent illness and injury. Wash your hands often. · Your doctor will do lab tests at regular visits to check on the effects of this medicine. Keep all appointments. · Keep all medicine out of the reach of children. Never share your medicine with anyone. Possible Side Effects While Using This Medicine:  
Call your doctor right away if you notice any of these side effects: · Allergic reaction: Itching or hives, swelling in your face or hands, swelling or tingling in your mouth or throat, chest tightness, trouble breathing · Change in how much or how often you urinate, difficult or painful urination · Dark urine or pale stools, nausea, vomiting, loss of appetite, stomach pain, yellow skin or eyes · Fever, chills, cough, difficulty breathing, runny or stuffy nose, sore throat, night sweats, body aches · Skin or mole changes, sores that do not heal 
· Stomach pain, cramping, bloody stools · Swollen glands in your neck, armpits, or groin · Unusual bleeding, bruising, or weakness If you notice other side effects that you think are caused by this medicine, tell your doctor. Call your doctor for medical advice about side effects. You may report side effects to FDA at 1-990-FDA-6575 © 2017 [address] Information is for End User's use only and may not be sold, redistributed or otherwise used for commercial purposes. The above information is an  only.  It is not intended as medical advice for individual conditions or treatments. Talk to your doctor, nurse or pharmacist before following any medical regimen to see if it is safe and effective for you. Introducing [facility]! Nirav Gutierrez introduces Cards Off patient portal. Now you can access parts of your medical record, email your doctor's office, and request medication refills online. 1. In your internet browser, go to [email or web address] 2. Click on the First Time User? Click Here link in the Sign In box. You will see the New Member Sign Up page. 3. Enter your Cards Off Access Code exactly as it appears below. You will not need to use this code after youve completed the sign-up process. If you do not sign up before the expiration date, you must request a new code. · Cards Off Access Code: [email or web address] Expires: 12/12/2017 12:59 PM 
 
4. Enter the last four digits of your Social Security Number (xxxx) and Date of Birth (mm/dd/yyyy) as indicated and click Submit. You will be taken to the next sign-up page. 5. Create a Cards Off ID. This will be your Cards Off login ID and cannot be changed, so think of one that is secure and easy to remember. 6. Create a Cards Off password. You can change your password at any time. 7. Enter your Password Reset Question and Answer. This can be used at a later time if you forget your password. 8. Enter your e-mail address. You will receive e-mail notification when new information is available in [address] 9. Click Sign Up. You can now view and download portions of your medical record. 10. Click the Download Summary menu link to download a portable copy of your medical information. If you have questions, please visit the Frequently Asked Questions section of the Cards Off website. Remember, Cards Off is NOT to be used for urgent needs. For medical emergencies, dial 911. Now available from your iPhone and Android! Please provide this summary of care documentation to your next provider. Your primary care clinician is listed as Lyndsey FLORES. If you have any questions after today's visit, please call [phone].

## 2017-11-14 NOTE — PROGRESS NOTES
REASON FOR VISIT This is a follow-up visit for Ms. Catie Baez for Seropositive Rheumatoid Arthritis and Osteopenia. Inflammatory arthritis phenotype includes: Anti-CCP positive: no 
Rheumatoid factor positive: yes (low titer 19.3) Erosive disease: no 
Extra-articular manifestations include: pericardial effusion, pulmonary nodules and GGO Immunosuppression Screening (5/27/2016): Quantiferon TB: negative PPD:  Not performed Hepatitis B: negative Hepatitis C: negative Therapy History includes: 
 
Current DMARD therapy include: methotrexate 17.5 mg every Sunday Prior DMARD therapy include: methotrexate Discontinued DMARDs because of inefficacy: None Discontinued DMARDs because of side effects: None Immunizations:  
Immunization History Administered Date(s) Administered  Influenza High Dose Vaccine PF 10/10/2016  Pneumococcal Polysaccharide (PPSV-23) 10/10/2016  Pneumococcal Vaccine (Pcv) 10/31/2007  Tdap 04/21/2014  Zoster Vaccine, Live 10/10/2016 Active problems include: 
 
Patient Active Problem List  
Diagnosis Code  DJD (degenerative joint disease) M19.90  Cyst   
 Osteopenia M85.80  Hx of mammogram Z92.89  Glucose intolerance (impaired glucose tolerance) R73.02  
 Pericardial effusion I31.3  Seropositive rheumatoid arthritis ([facility] [address] M05.9  Primary osteoarthritis of both hands M19.041, M19.042  
 Primary osteoarthritis of both knees M17.0  Trochanteric bursitis of both hips M70.61, M70.62  Chronic midline low back pain with right-sided sciatica M54.41, G89.29  
 Pulmonary nodules R91.8  Abnormal CT scan, chest R93.8  Long-term use of immunosuppressant medication Z79.899  ILD (interstitial lung disease) ([facility] [address] J84.9 HISTORY OF PRESENT ILLNESS 
 
Ms. Catie Baez returns for a follow-up visit. On her last visit, I increased her methotrexate incrementally to 17.5 mg every Sunday with good tolerance. Today, she feels bad.  She feels pain in her right shoulder that radiates to her neck and her hand. She cannot mobilize her pain. Her left shoulder is starting to hurt. She has left knee pain and swelling that is worse at night that radiates down to her feet. Her right knee is not as bad as her left knee. Her hands are aching at night. She feels well when she is active. She feels stiffness in her arms, hands, and knees lasting hours that improves with movement. She exercises which helps. She has been using NSAIDs. She endorses blurred vision and ankle swelling. She denies  fever, weight loss, vision loss, oral ulcers, dry cough, dyspnea, nausea, vomiting, dysphagia, abdominal pain, black or bloody stool, fall since last visit, rash, easy bruising and increased thirst. 
 
Last blood work was done on 9/14/2017 and did not reveal any significant adverse effects, except creatinine 1.02 mg/dL and eGFR 63 (previously 0.88 mg/dL and eGFR 75). Most recent inflammatory markers from 9/14/2017 revealed a ESR 20 mm/hr (previously 9, 4, 3, 10 mm/hr) and CRP 0.9 mg/L (previously 1.0, 0.8, 1.3, 0.7 mg/dL). The patient has not had any interval hospital admissions, infections, or surgeries. REVIEW OF SYSTEMS A comprehensive review of systems was performed and pertinent results are documented in the HPI, review of systems is otherwise non-contributory. PAST MEDICAL HISTORY She has a past medical history of Carpal tunnel syndrome; DJD (degenerative joint disease); DJD (degenerative joint disease) of knees (4/9/2013); Eczema; GERD (gastroesophageal reflux disease); Neuropathy; Osteoarthritis; Osteopenia; and Post-menopausal. She also has no past medical history of Difficult intubation; Essential hypertension; Nausea & vomiting; or Unspecified adverse effect of anesthesia. FAMILY HISTORY Her family history includes Arthritis-osteo in her brother, father, sister, and sister; Elevated Lipids in her sister;  Heart Disease (age of onset: 72) in her mother; Hypertension in her brother, maternal aunt, and maternal uncle; Stroke in her maternal aunt and maternal uncle; Thyroid Disease in her sister. SOCIAL HISTORY She reports that she quit smoking about 3 years ago. Her smoking use included Cigarettes. She smoked 0.10 packs per day. She has never used smokeless tobacco. She reports that she does not drink alcohol or use illicit drugs. MEDICATIONS Current Outpatient Prescriptions Medication Sig Dispense Refill  triamcinolone acetonide (KENALOG) 40 mg/mL injection 1 mL by Intra artICUlar route once for 1 dose. 1 mL 0  
 lidocaine, PF, (XYLOCAINE) 10 mg/mL (1 %) injection 1 mL by Intra artICUlar route once for 1 dose. 1 mL 0  
 tofacitinib (XELJANZ XR) 11 mg Tb24 Take 11 mg by mouth daily for 30 days. Indications: Rheumatoid Arthritis 30 Tab -  
 tofacitinib (XELJANZ XR) 11 mg Tb24 Take 11 mg by mouth daily for 30 days. Indications: Rheumatoid Arthritis 30 Tab 11  
 methotrexate (RHEUMATREX) 2.5 mg tablet Take 7 Tabs by mouth every Sunday for 90 days. 84 Tab 0  
 folic acid (FOLVITE) 1 mg tablet TAKE 1 TABLET BY MOUTH EVERY DAY 90 Tab 2  
 omeprazole (PRILOSEC) 40 mg capsule TAKE ONE CAPSULE BY MOUTH EVERY DAY (Patient taking differently: TAKE ONE CAPSULE BY MOUTH AS NEEDED) 30 Cap 0  
 vitamin E (AQUA GEMS) 400 unit capsule Take  by mouth daily.  methylsulfonylmethane (MSM) 1,000 mg tab Take  by mouth. Indications: Joint Health    
 OTHER Indications: Calcium and Vitamin D3 1200 mg total    
 ascorbic acid, vitamin C, (VITAMIN C) 500 mg tablet Take  by mouth. Takes 1 tab daily. ALLERGIES No Known Allergies PHYSICAL EXAMINATION Visit Vitals  BP (!) 164/96 (BP 1 Location: Left arm, BP Patient Position: Sitting)  Pulse 75  Temp 97.8 °F (36.6 °C)  Resp 18  Ht 5' 3\" (1.6 m)  Wt 165 lb (74.8 kg)  BMI 29.23 kg/m2 Body mass index is 29.23 kg/(m^2). General: Patient is alert, oriented x 3, not in acute distress HEENT:  
Sclerae are not injected and appear moist. 
Oral mucous membranes are moist, there are no ulcers present. There is no alopecia. Neck is supple Cardiovascular: 
Heart is regular rate and rhythm, no murmurs. Chest: 
Lungs are clear to auscultation bilaterally. No rhonchi, wheezes, or crackles. Extremities: 
Free of clubbing, cyanosis, edema Neurological exam: No focal sensory deficits, muscle strength is full in upper and lower extremities Skin exam: 
There are no rashes, no alopecia, no discoid lesions, no active Raynaud's, no livedo reticularis, no periungual erythema. Musculoskeletal exam: A comprehensive musculoskeletal exam was performed for all joints of each upper and lower extremity and assessed for swelling, tenderness and range of motion. Positive results are documented as below: 
  
Bilateral Michael and Heberden nodes Bilateral knee crepitus without effusion but active synovitis (left more than right) Bilateral hallux valgus Hammer toes Left ankle swelling and pain Joint Count 11/14/2017 9/14/2017 6/14/2017 3/14/2017 2/14/2017 11/16/2016 8/12/2016 Patient pain (0-100) 5 (No Data) 0 0 0 0 5 MHAQ 0.5 0 0 0 0 0 0 Left elbow - Tender 1 1 - - - - 1 Left elbow - Swollen 1 - - - - - 1 Left wrist- Tender - - 0 0 0 - - Left wrist- Swollen 1 1 1 1 1 1 1 Left 1st MCP - Tender 1 1 - - - - - Left 1st MCP - Swollen 1 1 1 - - - 1 Left 2nd MCP - Tender 1 - - - - - - Left 2nd MCP - Swollen 1 1 1 - - - 1 Left 3rd MCP - Tender 1 - - - - - - Left 3rd MCP - Swollen 1 - 1 - - - - Left 5th MCP - Tender 1 - - - - - - Left 5th MCP - Swollen 1 - 1 - - 1 - Left thumb IP - Tender 1 - - - - - - Left thumb IP - Swollen - - 1 - - - - Left 2nd PIP - Tender 1 - - - - - - Left 2nd PIP - Swollen 1 - 1 - - - - Left 3rd PIP - Tender 1 - - - - - - Left 3rd PIP - Swollen 1 - 1 1 - - - Left knee - Tender 1 1 - - - - - Left knee - Swollen 1 1 - - - - - Right shoulder - Tender 1 - - - - - - Right elbow - Tender 1 1 - - - - 1 Right elbow - Swollen 1 - - - - - 1 Right wrist- Tender 1 - - - - - - Right wrist- Swollen 1 1 1 - 1 1 1 Right 1st MCP - Tender 1 1 - - - - 1 Right 1st MCP - Swollen 1 1 1 - 1 - 1 Right 2nd MCP - Tender 1 - - - - - - Right 2nd MCP - Swollen 1 - 1 - - 1 - Right 3rd MCP - Tender 1 - - - - - 1 Right 3rd MCP - Swollen - - 1 - - - 1 Right 5th MCP - Tender 1 - - - - - - Right 5th MCP - Swollen 1 - 1 - 1 1 - Right thumb IP - Tender 1 - - - - - - Right 2nd PIP - Tender 1 1 - - - - - Right 2nd PIP - Swollen 1 1 1 - 1 - - Right 3rd PIP - Tender 1 - - - - - - Right 3rd PIP - Swollen 1 1 1 1 - - - Right 4th PIP - Tender - - - - - - - Right 4th PIP - Swollen 1 - - - - - - Right 5th PIP - Tender 1 - - - - - - Right knee - Tender 1 - - - - - - Right knee - Swollen - 1 - - - - - Tender Joint Count (Total) 21 6 0 0 0 - - Swollen Joint Count (Total) 17 9 15 3 5 - - Physician Assessment (0-10) 4 3 2 1 2 2 2 Patient Assessment (0-10) 0.5 (No Data) 0 0.5 0.5 1 2 CDAI Total (calculated) 42.5 - 17 4.5 7.5 - -  
 
DATA REVIEW Laboratory The following laboratory results were reviewed and discussed with the patient: 
 
Office Visit on 09/14/2017 Component Date Value  WBC 09/14/2017 6.3  RBC 09/14/2017 4.70   
 HGB 09/14/2017 14.5  HCT 09/14/2017 44.5  MCV 09/14/2017 95  [facility] 09/14/2017 30.9  MCHC 09/14/2017 32.6  RDW 09/14/2017 14.9  PLATELET 55/82/1951 233   
 NEUTROPHILS 09/14/2017 64  Lymphocytes 09/14/2017 26   
 MONOCYTES 09/14/2017 7   
 EOSINOPHILS 09/14/2017 2   
 BASOPHILS 09/14/2017 1  ABS. NEUTROPHILS 09/14/2017 4.0  Abs Lymphocytes 09/14/2017 1.6  ABS. MONOCYTES 09/14/2017 0.4  ABS. EOSINOPHILS 09/14/2017 0.1  ABS. BASOPHILS 09/14/2017 0.0   
 IMMATURE GRANULOCYTES 09/14/2017 0  ABS. IMM. GRANS. 09/14/2017 0.0  Glucose 09/14/2017 90   
 BUN 09/14/2017 16   
 Creatinine 09/14/2017 1.02*  GFR est non-AA 09/14/2017 54*  GFR est AA 09/14/2017 63   
 BUN/Creatinine ratio 09/14/2017 16  Sodium 09/14/2017 143  Potassium 09/14/2017 4.7  Chloride 09/14/2017 103   
 CO2 09/14/2017 23   
 Calcium 09/14/2017 9.6  Protein, total 09/14/2017 6.9  Albumin 09/14/2017 4.1  GLOBULIN, TOTAL 09/14/2017 2.8  A-G Ratio 09/14/2017 1.5  Bilirubin, total 09/14/2017 0.7  Alk. phosphatase 09/14/2017 67   
 AST (SGOT) 09/14/2017 27   
 ALT (SGPT) 09/14/2017 22  C-Reactive Protein, Qt 09/14/2017 0.9  Sed rate (ESR) 09/14/2017 20 Imaging Musculoskeletal Ultrasound Ultrasound of the right wrist. Indication: joint swelling. (5/27/2016) Using a [employer] e with 18 Mhz probe, limited views of the right wrist were obtained. This revealed hypoechoic collection without doppler within the midcarpal joint space. The tendons were normal. Bony contours were regular without erosions seen. There were no soft tissue masses noted. Impression: synovial hypertrophy Radiographs Bilateral Hand 5/27/2016: Moderate bilateral triscaphe joint osteoarthritis. Moderate left and mild right first MC joint osteoarthritis. Moderate bilateral first MCP joint osteoarthritis. Multifocal IP joint osteoarthritis is bilateral. There are central erosions in the right first through fourth DIP joints and left third and fifth MP joints. Subtle marginal erosions in the left second DIP joint. No fracture or dislocation on plain film. Alignment is within normal limits. Bone mineralization is decreased. No soft tissue calcification. Bilateral Shoulder 5/27/2016: LEFT: demonstrate mild osteopenia. No acute fracture or dislocation. Age-appropriate AC joint osteoarthritis with marginal osteophytes. Glenohumeral joint is within normal limits for age. No evidence of erosion. RIGHT: demonstrate osteopenia.  No acute fracture or dislocation. AC joint osteoarthritis includes marginal osteophytes. Glenohumeral joint osteoarthritis is age-appropriate. Cortical irregularity of the greater tuberosity of the humerus indicates underlying rotator cuff pathology Left Knee 12/29/2015: no fracture. Joint effusion and prepatellar soft tissue contusion versus bursitis. Increased osteoarthritis. Lumbar Spine 12/29/2015: stable dextroconvex scoliosis without evidence of acute fracture or subluxation. Significant multilevel degenerative disc disease is again noted from L2-S1. The patient is status post laminectomy from L1 through to the sacrum. Multilevel facet degenerative arthritis again noted. Left Knee 8/03/2010: sclerosis and deformity of the lateral tibial plateau with associated lateral compartment osteoarthritis is suggestive of old injury. No acute fracture is seen. There is moderate to severe patellofemoral  
 
CT Imaging CT Chest without contrast 6/20/2017: The style windows demonstrate no adenopathy. There continues to be a moderate pericardial effusion which is unchanged when compared with 7/22/2016. No thyroid nodules. No axillary adenopathy. No adrenal lesions. There is a small moderate hiatal hernia. Review of bone windows demonstrates no destructive lesions. Lung windows reveal a stable small solid nodule in the right middle lobe laterally. The ill-defined semisolid groundglass opacity anteriorly in left lower lobe is unchanged when compared with the CT of 3/12/2015. This should be evaluated on continue follow-up. Follow-up exam in one year is suggested. No new nodules are noted. The high-resolution images demonstrate mild changes of centrilobular emphysema. There is no evidence for fibrosis or bronchiectasis. No significant interstitial lung disease. CT Chest without contrast 7/22/2016: Small lingular and right middle lobe nodules and left lower lobe groundglass opacity all unchanged.  This represents a 16 month follow-up examination. The small middle lobe nodule and groundglass opacity in the left lower lobe are essentially unchanged in comparison with 3/9/2011. CT Chest with contrast 1/27/2016: nodules in both lungs including a soft more groundglass appearance nodule in the left lower lobe are stable when compared to the previous examination. No new acute finding or new nodule. Small pericardial effusion. Hiatal hernia. CT Chest without contrast 6/15/2015: stable semisolid nodule anteriorly in left lower lobe. The other nodules are stable. Decreasing pericardial effusion. Stable adenopathy. She underwent a fine needle biopsy which showed abundant benign and reactive bronchial epithelial cells and no malignancy. CT Chest without contrast 5/13/2015: a suspicious semisolid nodule in the anterior segment of the left lower lobe just posterior to the fissure with some retraction of the fissure. This measures 1.2 x 1 x 1.4 cm. PET/CT negative for an 4/10/2015. Moderate stable pericardial effusion. Stable enlarged lymph nodes as described. Several other small pulmonary nodules also stable. PET/CT Scan 4/10/2015: showed the vague semisolid nodule in the left lower lobe demonstrates no increased metabolic activity on PET suggesting benign etiology but is nonspecific and neoplasm is not entirely excluded. There are 2 lymph nodes in the right neck which are normal in size and demonstrate slight increased metabolic activity nonspecific most likely reactive. CT Heart without contrast with calcium 3/12/2015: total calcium score of 0. A low score suggests a low likelihood of coronary disease but does not exclude the possibility of significant coronary artery narrowing. 3 mm right middle lobe lung nodule unchanged. Pericardial effusion. Hiatal hernia. A CT chest without contrast showed a suspicious semisolid nodule in the anterior segment of the left lower lobe just posterior to the fissure with some retraction of the fissure.  This measures 1.2 x 1 x 1.4 cm. This could represent a small bronchogenic carcinoma. Moderate pericardial effusion clinical correlation is recommended. 2 enlarged lymph nodes as described. Several other small pulmonary nodules can be evaluated on followup 23X 
 
CT Heart without contrast with calcium 3/09/2011: total calcium score of 0. A low score suggests a low likelihood of coronary disease but does not exclude the possibility of significant coronary artery narrowing. Small pericardial effusion. 3 mm pulmonary nodule right middle lobe. 7 mm density left lower lobe. MR Imaging None DXA DXA  10/06/2017: (excluded None) showed lumbar spine L1-L4 T score 1.2 (BMD 1.324 g/cm2), left femoral neck T score: -2.3 (0.721 g/cm2), left total hip T score: -2.1 (0.748 g/cm2), right femoral neck T score: -2.1 (0.749g/cm2), right total hip T score: -1.4 (0.834 g/cm2), and distal one third left radius T score N/A (BMD N/A g/cm2). FRAX score N/A % probability in 10 years for major osteoporotic fracture and N/A % 10 year probability of hip fracture. DXA 5/21/2015: lumbar spine L1-L4 T score 1.7 (BMD 1.389 g/cm2), left femoral neck T score: -2.2 (0.728 g/cm2), right femoral neck  T score: -1.4 (0.825 g/cm2), total hip T score: -2.2 (0.728 g/cm2). Echocardiogram 
 
Echocardiogram 6/03/2015: LEFT VENTRICLE: Size was normal. Systolic function was normal. Ejection fraction was estimated in the range of 60 % to 65 %. There were no regional wall motion abnormalities. Wall thickness was mildly increased. DOPPLER: Doppler parameters were consistent with abnormal left ventricular relaxation (grade 1 diastolic dysfunction). RIGHT VENTRICLE: The size was normal. Systolic function was normal. LEFT ATRIUM: Size was normal. LA volume index was 23 ml/m squared. ATRIAL SEPTUM: There was a small atrial septal aneurysm. There was no evidence of shunt on color flow imaging.  RIGHT ATRIUM: Size was normal. MITRAL VALVE: Normal valve structure. DOPPLER: There was trivial regurgitation. AORTIC VALVE: Normal valve structure. DOPPLER: Transaortic velocity was within the normal range. There was no stenosis. There was no significant regurgitation. TRICUSPID VALVE: Normal valve structure. DOPPLER: There was mild regurgitation. Right atrial pressure estimate: 5 mmHg. Pulmonary artery systolic pressure: 25 mmHg. There was no pulmonary hypertension. PULMONIC VALVE: Not well visualized. DOPPLER: The transpulmonic velocity was within the normal range. There was trivial regurgitation. AORTA: The root exhibited normal size. SYSTEMIC VEINS: IVC: The inferior vena cava was normal in size. PERICARDIUM: A small pericardial effusion was identified along the right atrial free wall. There was no evidence of hemodynamic compromise. PFT 
 
PFT 6/20/2017: FVC of 1.78 (>80%), FEV1 of 1.31 (85%), FEV1/FVC of 73.82% and a DLCO of 19.65 (116%). PATHOLOGY Fine Need Aspirate of LLL 6/15/2015: Abundant benign and reactive bronchial epithelial cells. No cells diagnostic for malignancy PROCEDURE Bilateral Trochanteric Bursitis Kenalog 40 mg. (02/14/17) Bilateral Knee Kenalog 40 mg. (11/16/2016) PROCEDURE Indications:  
Symptom relief from Right Shoulder Arthritis. (11/14/17) Procedure: After consent was obtained, and timeout performed, using sterile technique the Right Shouder joint was prepped using Chlorprep. Local anesthetic used: Ethyl Chloride. The joint was entered and 0 ml's of fluid was withdrawn. Kenalog 40 mg was mixed with 1% lidocaine 1 ml and injected into the joint and the needle withdrawn. The procedure was well tolerated. The patient was asked to continue to rest the joint for a few more days before resuming regular activities. It may be more painful for the first 1-2 days. Watch for fever, or increased swelling or persistent pain in the joint.  Call or return to clinic prn if such symptoms occur or there is failure to improve as anticipated. ASSESSMENT AND PLAN This is a follow-up visit for Ms. Chichi Duncan. 1) Seropositive Rheumatoid Arthritis with Interstitial Lung Disease. Today, her CDAI 42.5 (previously N/A 17, 4.5, 7.5, 8, 16, 15.5, 19), with 21  tender and 17 swollen, consistent with high disease activity. She has been on methotrexate 17.5 mg every Sunday with good tolerance but continues to have active synovitis. I injected her right shoulder due to pain, with good tolerance. She has CKD stage 2, so I will need to monitor her renal function while on methotrexate closely. I discussed with her about advancing therapy to the biologic (small particle, anti-TNF). We discussed the potential adverse effects, which include infections, and routes of administration (oral versus infusion versus subcutaneous). The patient was informed these medications co-pay are subject to the patient's insurance coverage and we will not know until it has been submitted to the insurance company. She preferred Emm[employer] Perfect. An order will be submitted today and gave her a 30 day sample supply. She already had the Shingles vaccine. She denies a history of bowel perforation. 2) Long Term Use of Immunosuppressants. The patient remains on immunomodulatory medications (methotrexate) and requires frequent toxicity monitoring by blood work. Respective labs were ordered (CBC and CMP). 3) Bilateral Hands and Knee Osteoarthritis. This was not an active issue today. 4) Bilateral Trochanteric Bursitis. This was not an active issue today. 5) Osteopenia. Her 10/06/2017 DXA showed left femoral neck T score: -2.3 (0.721 g/cm2). I will continue to monitor. 6) Pericardial Effusion. This is chronic and asymptomatic. 7) Pulmonary Nodule and Ground Glass Opacities. The ill-defined semisolid groundglass opacity anteriorly in left lower lobe is unchanged when compared with the CT of 3/12/2015.  CT Chest and PFTs did not show active disease or pulmonary compromise. 8) Chronic Lower Back Pain. She has a spinal cord stimulator. This was not an active issue today. 9) CKD Stage 2. Her creatinine was 1.02 mg/dL and eGFR 54 (previously 0.88 mg/dL and eGFR 75). I asked her to avoid NSAIDs. I will repeat today. The patient voiced understanding of the aforementioned assessment and plan. Summary of plan was provided in the After Visit Summary patient instructions. TODAY'S ORDERS Orders Placed This Encounter  QUANTIFERON TB GOLD  
 CBC WITH AUTOMATED DIFF  
 METABOLIC PANEL, COMPREHENSIVE  C REACTIVE PROTEIN, QT  
 SED RATE (ESR)  CHRONIC HEPATITIS PANEL  
 TRIAMCINOLONE ACETONIDE INJ  20610 - DRAIN/INJECT LARGE JOINT/BURSA  triamcinolone acetonide (KENALOG) 40 mg/mL injection  lidocaine, PF, (XYLOCAINE) 10 mg/mL (1 %) injection  tofacitinib (XELJANZ XR) 11 mg Tb24  
 tofacitinib (XELJANZ XR) 11 mg Tb24 Future Appointments Date Time Provider Franny Alba 12/12/2017 1:15 PM Nilsa Cooks, MD [facility] SCHED  
2/14/2018 10:00 AM Caroleen Holter, MD [address]  
6/20/2018 10:00 AM [facility] PSYCHIATRIC [place] CT ER 2 [facility]CT . JONA'S  Rui Drake MD, [facility] Adult Rheumatology Musculoskeletal Ultrasound Certified [address] [place] [address], [address] [place] [address], [address] [place] [address] Phone [phone] Fax [phone]

## 2017-11-14 NOTE — PATIENT INSTRUCTIONS
Please continue to rest the joint for a few more days before resuming regular activities. It may be more painful for the first 1-2 days. Watch for fever, or increased swelling or persistent pain in the joint. Call or return to clinic prn if such symptoms occur or there is failure to improve as anticipated.  
  
CONTINUE METHOTREXATE Tofacitinib (By mouth) Tofacitinib (toe-fa-SYE-ti-nib) Treats rheumatoid arthritis. Brand Name(s): Jennings Graft XR There may be other brand names for this medicine. When This Medicine Should Not Be Used: This medicine is not right for everyone. Do not use it if you had an allergic reaction to tofacitinib. How to Use This Medicine:  
Tablet, Long Acting Tablet · Your doctor will tell you how much medicine to use. Do not use more than directed. · Swallow the extended-release tablet whole. Do not crush, break, or chew it. · If you take the extended-release tablet, part of the tablet may pass into your stools. This is normal and is nothing to worry about. · This medicine should come with a Medication Guide. Ask your pharmacist for a copy if you do not have one. · Missed dose: Take a dose as soon as you remember. If it is almost time for your next dose, wait until then and take a regular dose. Do not take extra medicine to make up for a missed dose. · Store the medicine in a closed container at room temperature, away from heat, moisture, and direct light. Drugs and Foods to Avoid: Ask your doctor or pharmacist before using any other medicine, including over-the-counter medicines, vitamins, and herbal products. · Some medicines can affect how tofacitinib works. Tell your doctor if you are using any of the following: ¨ Medicines that weaken your immune system (including azathioprine, cyclosporine, methotrexate, tacrolimus) ¨ Medicine to treat infection (including fluconazole, ketoconazole, rifampin) ¨ NSAID medicine (including aspirin, diclofenac, ibuprofen, naproxen) ¨ Steroid medicine (including dexamethasone, hydrocortisone, methylprednisolone, prednisolone, prednisone) · This medicine may interfere with vaccines. Ask your doctor before you get a flu shot or any other vaccines. Warnings While Using This Medicine: · It is not safe to take this medicine during pregnancy. It could harm an unborn baby. Tell your doctor right away if you become pregnant. Use an effective form of birth control during treatment with this medicine and for at least 4 weeks after your last dose. · Do not breastfeed while you are using this medicine. · Tell your doctor if you have kidney disease, liver disease (including hepatitis B or C), lung disease (including interstitial lung disease), HIV, cancer or a history of cancer, blood problems, diabetes, high cholesterol, stomach or bowel problems (including blockage, diverticulitis, ulcers), or a history of tuberculosis. Also tell your doctor if you have a current infection or an infection that keeps coming back. · This medicine may cause the following problems: 
¨ Increased risk for serious infections, including herpes infection or shingles ¨ Increased risk for certain cancers, including nonmelanoma skin cancer or lymphoma ¨ Stomach or bowel perforation (tear or hole) ¨ High cholesterol in the blood · Talk with your doctor before using this medicine if you plan to have children. Some women who use this medicine have become infertile (unable to have children). · This medicine may make you bleed, bruise, or get infections more easily. Take precautions to prevent illness and injury. Wash your hands often. · Your doctor will do lab tests at regular visits to check on the effects of this medicine. Keep all appointments. · Keep all medicine out of the reach of children. Never share your medicine with anyone. Possible Side Effects While Using This Medicine:  
Call your doctor right away if you notice any of these side effects: · Allergic reaction: Itching or hives, swelling in your face or hands, swelling or tingling in your mouth or throat, chest tightness, trouble breathing · Change in how much or how often you urinate, difficult or painful urination · Dark urine or pale stools, nausea, vomiting, loss of appetite, stomach pain, yellow skin or eyes · Fever, chills, cough, difficulty breathing, runny or stuffy nose, sore throat, night sweats, body aches · Skin or mole changes, sores that do not heal 
· Stomach pain, cramping, bloody stools · Swollen glands in your neck, armpits, or groin · Unusual bleeding, bruising, or weakness If you notice other side effects that you think are caused by this medicine, tell your doctor. Call your doctor for medical advice about side effects. You may report side effects to FDA at 6-031-FDA-1639 © 2017 [address] Information is for End User's use only and may not be sold, redistributed or otherwise used for commercial purposes. The above information is an  only. It is not intended as medical advice for individual conditions or treatments. Talk to your doctor, nurse or pharmacist before following any medical regimen to see if it is safe and effective for you.

## 2017-11-15 LAB
ALBUMIN SERPL-MCNC: 4 G/DL (ref 3.5–4.8)
ALBUMIN/GLOB SERPL: 1.5 {RATIO} (ref 1.2–2.2)
ALP SERPL-CCNC: 70 IU/L (ref 39–117)
ALT SERPL-CCNC: 35 IU/L (ref 0–32)
AST SERPL-CCNC: 33 IU/L (ref 0–40)
BASOPHILS # BLD AUTO: 0 X10E3/UL (ref 0–0.2)
BASOPHILS NFR BLD AUTO: 1 %
BILIRUB SERPL-MCNC: 0.7 MG/DL (ref 0–1.2)
BUN SERPL-MCNC: 16 MG/DL (ref 8–27)
BUN/CREAT SERPL: 15 (ref 12–28)
CALCIUM SERPL-MCNC: 9.6 MG/DL (ref 8.7–10.3)
CHLORIDE SERPL-SCNC: 102 MMOL/L (ref 96–106)
CO2 SERPL-SCNC: 31 MMOL/L (ref 18–29)
COMMENT, 144067: NORMAL
CREAT SERPL-MCNC: 1.05 MG/DL (ref 0.57–1)
CRP SERPL-MCNC: 2 MG/L (ref 0–4.9)
EOSINOPHIL # BLD AUTO: 0.1 X10E3/UL (ref 0–0.4)
EOSINOPHIL NFR BLD AUTO: 2 %
ERYTHROCYTE [DISTWIDTH] IN BLOOD BY AUTOMATED COUNT: 15.1 % (ref 12.3–15.4)
ERYTHROCYTE [SEDIMENTATION RATE] IN BLOOD BY WESTERGREN METHOD: 2 MM/HR (ref 0–40)
GFR SERPLBLD CREATININE-BSD FMLA CKD-EPI: 52 ML/MIN/1.73
GFR SERPLBLD CREATININE-BSD FMLA CKD-EPI: 60 ML/MIN/1.73
GLOBULIN SER CALC-MCNC: 2.6 G/DL (ref 1.5–4.5)
GLUCOSE SERPL-MCNC: 79 MG/DL (ref 65–99)
HBV CORE AB SERPL QL IA: NEGATIVE
HBV CORE IGM SERPL QL IA: NEGATIVE
HBV E AB SERPL QL IA: NEGATIVE
HBV E AG SERPL QL IA: NEGATIVE
HBV SURFACE AB SER QL: NON REACTIVE
HBV SURFACE AG SERPL QL IA: NEGATIVE
HCT VFR BLD AUTO: 44.5 % (ref 34–46.6)
HCV AB S/CO SERPL IA: <0.1 S/CO RATIO (ref 0–0.9)
HGB BLD-MCNC: 14.8 G/DL (ref 11.1–15.9)
IMM GRANULOCYTES # BLD: 0 X10E3/UL (ref 0–0.1)
IMM GRANULOCYTES NFR BLD: 0 %
LYMPHOCYTES # BLD AUTO: 1.4 X10E3/UL (ref 0.7–3.1)
LYMPHOCYTES NFR BLD AUTO: 22 %
MCH RBC QN AUTO: 31.9 PG (ref 26.6–33)
MCHC RBC AUTO-ENTMCNC: 33.3 G/DL (ref 31.5–35.7)
MCV RBC AUTO: 96 FL (ref 79–97)
MONOCYTES # BLD AUTO: 0.6 X10E3/UL (ref 0.1–0.9)
MONOCYTES NFR BLD AUTO: 10 %
NEUTROPHILS # BLD AUTO: 3.9 X10E3/UL (ref 1.4–7)
NEUTROPHILS NFR BLD AUTO: 65 %
PLATELET # BLD AUTO: 256 X10E3/UL (ref 150–379)
POTASSIUM SERPL-SCNC: 5.3 MMOL/L (ref 3.5–5.2)
PROT SERPL-MCNC: 6.6 G/DL (ref 6–8.5)
RBC # BLD AUTO: 4.64 X10E6/UL (ref 3.77–5.28)
SODIUM SERPL-SCNC: 142 MMOL/L (ref 134–144)
WBC # BLD AUTO: 6.1 X10E3/UL (ref 3.4–10.8)

## 2017-11-17 LAB
ANNOTATION COMMENT IMP: NORMAL
GAMMA INTERFERON BACKGROUND BLD IA-ACNC: 0.03 IU/ML
M TB IFN-G BLD-IMP: NEGATIVE
M TB IFN-G CD4+ BCKGRND COR BLD-ACNC: 0 IU/ML
M TB IFN-G CD4+ T-CELLS BLD-ACNC: 0.03 IU/ML
MITOGEN IGNF BLD-ACNC: 6.68 IU/ML
QUANTIFERON INCUBATION: NORMAL
SERVICE CMNT-IMP: NORMAL

## 2017-11-17 RX ORDER — LEFLUNOMIDE 20 MG/1
20 TABLET ORAL DAILY
Qty: 90 TAB | Refills: 0 | Status: SHIPPED | OUTPATIENT
Start: 2017-11-17 | End: 2018-02-26 | Stop reason: SDUPTHER

## 2017-11-17 NOTE — PROGRESS NOTES
Pt notified of lab results and MD recommendations. Informed pt that her kidney function was a little worse and her liver enzymes were elevated. I told her to stop the methotrexate and folic acid and to start taking leflunomide, half a tab daily for 7 days and then 1 full tablet a day from then on if she does not have diarrhea or upset stomach. She stated an understanding.

## 2017-11-28 DIAGNOSIS — K21.9 GASTROESOPHAGEAL REFLUX DISEASE WITHOUT ESOPHAGITIS: ICD-10-CM

## 2017-11-28 RX ORDER — OMEPRAZOLE 40 MG/1
CAPSULE, DELAYED RELEASE ORAL
Qty: 30 CAP | Refills: 0 | Status: SHIPPED | OUTPATIENT
Start: 2017-11-28 | End: 2017-12-12 | Stop reason: SDUPTHER

## 2017-11-29 ENCOUNTER — TELEPHONE (OUTPATIENT)
Dept: RHEUMATOLOGY | Age: 77
End: 2017-11-29

## 2017-12-06 ENCOUNTER — TELEPHONE (OUTPATIENT)
Dept: RHEUMATOLOGY | Age: 77
End: 2017-12-06

## 2017-12-06 NOTE — TELEPHONE ENCOUNTER
----- Message from Roberto Baig MD sent at 12/6/2017  6:36 AM EST -----  We can do Enbrel. Just let her know please    ----- Message -----     From: Mildred Villaseñor LPN     Sent: 27/0/9097   3:12 PM       To: Roberto Baig MD    Gorge Drain XR was denied due to patient needs to have tried Humira or Enbrel. Do you want to appeal or try one of the medications?

## 2017-12-06 NOTE — TELEPHONE ENCOUNTER
Left message for patient to call the office in regards,  to letting patient know Lucinda Verito XR has been denied by insurance Medicare part D, due to patient needs to try Enbrel or Humira first. Dr. Tahira Reeves would like the patient to try Enbrel if she is okay with injections.

## 2017-12-08 ENCOUNTER — TELEPHONE (OUTPATIENT)
Dept: RHEUMATOLOGY | Age: 77
End: 2017-12-08

## 2017-12-12 ENCOUNTER — OFFICE VISIT (OUTPATIENT)
Dept: FAMILY MEDICINE CLINIC | Age: 77
End: 2017-12-12

## 2017-12-12 VITALS
SYSTOLIC BLOOD PRESSURE: 130 MMHG | TEMPERATURE: 97.6 F | RESPIRATION RATE: 18 BRPM | HEART RATE: 70 BPM | HEIGHT: 63 IN | OXYGEN SATURATION: 97 % | WEIGHT: 164 LBS | BODY MASS INDEX: 29.06 KG/M2 | DIASTOLIC BLOOD PRESSURE: 90 MMHG

## 2017-12-12 DIAGNOSIS — Z01.30 BLOOD PRESSURE CHECK: ICD-10-CM

## 2017-12-12 DIAGNOSIS — R79.89 ELEVATED SERUM CREATININE: Primary | ICD-10-CM

## 2017-12-12 NOTE — PROGRESS NOTES
Chief Complaint   Patient presents with    Abnormal Lab Results     follow up - concerns with kidney     1. Have you been to the ER, urgent care clinic since your last visit? Hospitalized since your last visit? No    2. Have you seen or consulted any other health care providers outside of the [address] [place] [address] since your last visit? Include any pap smears or colon screening.  No

## 2017-12-12 NOTE — PATIENT INSTRUCTIONS
Home Blood Pressure Test: About This Test  What is it? A home blood pressure test allows you to keep track of your blood pressure at home. Blood pressure is a measure of the force of blood against the walls of your arteries. Blood pressure readings include two numbers, such as 130/80 (say \"130 over 80\"). The first number is the systolic pressure. The second number is the diastolic pressure. Why is this test done? You may do this test at home to:  · Find out if you have high blood pressure. · Track your blood pressure if you have high blood pressure. · Track how well medicine is working to reduce high blood pressure. · Check how lifestyle changes, such as weight loss and exercise, are affecting blood pressure. How can you prepare for the test?  · Do not use caffeine, tobacco, or medicines known to raise blood pressure (such as nasal decongestant sprays) for at least 30 minutes before taking your blood pressure. · Do not exercise for at least 30 minutes before taking your blood pressure. What happens before the test?  Take your blood pressure while you feel comfortable and relaxed. Sit quietly with both feet on the floor for at least 5 minutes before the test.  What happens during the test?  · Sit with your arm slightly bent and resting on a table so that your upper arm is at the same level as your heart. · Roll up your sleeve or take off your shirt to expose your upper arm. · Wrap the blood pressure cuff around your upper arm so that the lower edge of the cuff is about 1 inch above the bend of your elbow. Proceed with the following steps depending on if you are using an automatic or manual pressure monitor. Automatic blood pressure monitors  · Press the on/off button on the automatic monitor and wait until the ready-to-measure \"heart\" symbol appears next to zero in the display window. · Press the start button. The cuff will inflate and deflate by itself.   · Your blood pressure numbers will appear on the screen. · Write your numbers in your log book, along with the date and time. Manual blood pressure monitors  · Place the earpieces of a stethoscope in your ears, and place the bell of the stethoscope over the artery, just below the cuff. · Close the valve on the rubber inflating bulb. · Squeeze the bulb rapidly with your opposite hand to inflate the cuff until the dial or column of mercury reads about 30 mm Hg higher than your usual systolic pressure. If you do not know your usual pressure, inflate the cuff to 210 mm Hg or until the pulse at your wrist disappears. · Open the pressure valve just slightly by twisting or pressing the valve on the bulb. · As you watch the pressure slowly fall, note the level on the dial at which you first start to hear a pulsing or tapping sound through the stethoscope. This is your systolic blood pressure. · Continue letting the air out slowly. The sounds will become muffled and will finally disappear. Note the pressure when the sounds completely disappear. This is your diastolic blood pressure. Let out all the remaining air. · Write your numbers in your log book, along with the date and time. What else should you know about the test?  Results for adults ages 25 and older (mm Hg):  · Normal (ideal): Systolic 150 or below. Diastolic 79 or below. · Prehypertension: Systolic 507 to 151. Diastolic 80 to 89. · Hypertension: Systolic 926 or above. Diastolic 90 or above. Follow-up care is a key part of your treatment and safety. Be sure to make and go to all appointments, and call your doctor if you are having problems. It's also a good idea to keep a list of the medicines you take. Where can you learn more? Go to http://jimmie-geronimo.info/. Enter C427 in the search box to learn more about \"Home Blood Pressure Test: About This Test.\"  Current as of: September 21, 2016  Content Version: 11.4  © 8850-8081 [employer].  Care instructions adapted under license by [employer] (which disclaims liability or warranty for this information). If you have questions about a medical condition or this instruction, always ask your healthcare professional. [address] any warranty or liability for your use of this information.

## 2017-12-12 NOTE — PROGRESS NOTES
Patient Name: Amina Meyer   MRN: [id number]    Alli Elizalde is a 78 y.o. female who presents with the following: Here to establish care with new PCP. Concerned about her kidney marker being elevated in Sept and Nov. Thought to be due to MTX prescribed by her rheumatologist which has since been discontinued. Prior creatinine levels were wnl. Had has elevated BPs at prior OVs but normal today. Has not monitored BP at home. Lab Results   Component Value Date/Time    Creatinine (POC) 0.9 01/27/2016 10:37 AM    Creatinine 1.05 11/14/2017 11:05 AM       BP Readings from Last 3 Encounters:   12/12/17 130/90   11/14/17 (!) 164/96   09/14/17 (!) 152/91       Review of Systems   Constitutional: Negative for fever, malaise/fatigue and weight loss. Respiratory: Negative for cough, hemoptysis, shortness of breath and wheezing. Cardiovascular: Negative for chest pain, palpitations, leg swelling and PND. Gastrointestinal: Negative for abdominal pain, constipation, diarrhea, nausea and vomiting. The patient's medications, allergies, past medical history, surgical history, family history and social history were reviewed and updated where appropriate. Prior to Admission medications    Medication Sig Start Date End Date Taking? Authorizing Provider   leflunomide (ARAVA) 20 mg tablet Take 1 Tab by mouth daily for 90 days. Take half tab daily for 7 days and then one tab if tolerated 11/17/17 2/15/18 Yes Cathy Du MD   omeprazole (PRILOSEC) 40 mg capsule TAKE ONE CAPSULE BY MOUTH EVERY DAY  Patient taking differently: TAKE ONE CAPSULE BY MOUTH AS NEEDED 5/22/17  Yes Lissy Pitts NP   vitamin E (AQUA GEMS) 400 unit capsule Take 400 Units by mouth daily. Yes Historical Provider   OTHER Take 1,200 mg by mouth daily. Indications: Calcium and Vitamin D3 1200 mg total   Yes Historical Provider   ascorbic acid, vitamin C, (VITAMIN C) 500 mg tablet Take 500 mg by mouth daily.  Takes 1 tab daily. Yes Historical Provider   etanercept (ENBREL SURECLICK) 50 mg/mL (1.32 mL) injection 0.98 mL by SubCUTAneous route every seven (7) days. 12/11/17   César Weiss MD       No Known Allergies      Past Medical History:   Diagnosis Date    Carpal tunnel syndrome     DJD (degenerative joint disease)     hip/spine lumbar spine with spinal stenosis     DJD (degenerative joint disease) of knees 4/9/2013    Ortho, [facility] Dr Ron Bell GERD (gastroesophageal reflux disease)     ILD (interstitial lung disease) ([facility] 6/14/2017    Osteopenia of multiple sites     Hip; 5/21/15     Post-menopausal     Primary osteoarthritis of both hands 5/27/2016    Primary osteoarthritis of both knees 5/27/2016    Pulmonary nodules 6/15/2016    Seropositive rheumatoid arthritis ([facility] 5/27/2016       Past Surgical History:   Procedure Laterality Date    ENDOSCOPY, COLON, DIAGNOSTIC  2007    -repeat in 5 years    HX CATARACT REMOVAL Right 10/2015    HX HYSTERECTOMY  1970's    w/partial ovariectomy r/t endometriosis by Dr. Sandra Fernandez    [place] [id number]  12/2003    bone stimulator Dr. Julia Christensen at [address]  10/2004       Family History   Problem Relation Age of Onset    Heart Disease Mother 72     PTCA    Elevated Lipids Sister     Hypertension Brother     Arthritis-osteo Brother     Hypertension Maternal Aunt     Stroke Maternal Aunt     Stroke Maternal Uncle     Hypertension Maternal Uncle     Arthritis-osteo Sister    Alejandrina Luxembourgish Sister     Thyroid Disease Sister     Arthritis-osteo Father      knees       Social History     Social History    Marital status:      Spouse name: N/A    Number of children: N/A    Years of education: N/A     Occupational History    Not on file.      Social History Main Topics    Smoking status: Former Smoker     Packs/day: 0.10     Types: Cigarettes     Quit date: 4/1/2014    Smokeless tobacco: Never Used Comment: 40 years of .5 pack/day prior to quit 1/2012, relapsed.  Alcohol use No    Drug use: No    Sexual activity: Yes     Partners: Male     Other Topics Concern     Service No    Blood Transfusions No    Caffeine Concern No    Occupational Exposure No    Hobby Hazards No    Sleep Concern No    Stress Concern No    Weight Concern No    Special Diet No    Back Care No    Exercise Yes    Bike Helmet No    Seat Belt Yes    Self-Exams Yes     Social History Narrative           OBJECTIVE    Visit Vitals    /90 (BP 1 Location: Left arm, BP Patient Position: Sitting)    Pulse 70    Temp 97.6 °F (36.4 °C) (Oral)    Resp 18    Ht 5' 3\" (1.6 m)    Wt 164 lb (74.4 kg)    SpO2 97%    BMI 29.05 kg/m2       Physical Exam   Constitutional: She is well-developed, well-nourished, and in no distress. No distress. HENT:   Head: Normocephalic and atraumatic. Right Ear: External ear normal.   Left Ear: External ear normal.   Eyes: Conjunctivae and EOM are normal. Pupils are equal, round, and reactive to light. Cardiovascular: Normal rate, regular rhythm and normal heart sounds. Exam reveals no gallop and no friction rub. No murmur heard. Pulmonary/Chest: Effort normal and breath sounds normal. No respiratory distress. She has no wheezes. Skin: She is not diaphoretic. Psychiatric: Mood, memory, affect and judgment normal.   Nursing note and vitals reviewed. ASSESSMENT AND PLAN  Merced Ruiz is a 78 y.o. female who presents today for:    1. Elevated serum creatinine  Discussed that Cr may have been transiently elevated due to medications. There is some expected age-related decline in kidney function as well. Will monitor Cr at her next rheumatology appt and continue to watch. Recommend low salt diet. 2. Blood pressure check  WNL today; recommend pt to start taking BP measurements at home to r/o white coat HTN.        Medications Discontinued During This Encounter Medication Reason    omeprazole (PRILOSEC) 40 mg capsule Duplicate Order    methylsulfonylmethane (MSM) 1,000 mg tab Not A Current Medication       Follow-up Disposition:  Return in about 3 months (around 3/12/2018) for HTN follow up. Medication risks/benefits/costs/interactions/alternatives discussed with patient. Advised patient to call back or return to office if symptoms worsen/change/persist. If patient cannot reach us or should anything more severe/urgent arise he/she should proceed directly to the nearest emergency department. Discussed expected course/resolution/complications of diagnosis in detail with patient. Patient given a written after visit summary which includes his/her diagnoses, current medications and vitals. Patient expressed understanding with the diagnosis and plan.      Alondra Cheung M.D.

## 2017-12-12 NOTE — MR AVS SNAPSHOT
Visit Information Date & Time Provider Department Dept. Phone Encounter #  
 12/12/2017  1:15 PM Aylin Herbert MD [address] [place] [address] [phone] [id number] Follow-up Instructions Return if symptoms worsen or fail to improve. Your Appointments 2/14/2018 10:00 AM  
ESTABLISHED PATIENT with Zoraida Bardales MD  
[address] ([address]) [facility] Tanya Wheatley [address] [id number]  
[phone]  
  
   
 [facility] Tanya [place]anna [address] [address] Upcoming Health Maintenance Date Due COLONOSCOPY 2/7/2018 MEDICARE YEARLY EXAM 4/25/2018 GLAUCOMA SCREENING Q2Y 5/9/2018 DTaP/Tdap/Td series (2 - Td) 4/21/2024 Allergies as of 12/12/2017  Review Complete On: 12/12/2017 By: Romayne Putty No Known Allergies Current Immunizations  Reviewed on 11/16/2016 Name Date Influenza High Dose Vaccine PF 10/10/2016 Pneumococcal Polysaccharide (PPSV-23) 10/10/2016 Pneumococcal Vaccine (Pcv) 10/31/2007 Tdap 4/21/2014 Zoster Vaccine, Live 10/10/2016 Not reviewed this visit Vitals BP Pulse Temp Resp Height(growth percentile) Weight(growth percentile) 130/90 (BP 1 Location: Left arm, BP Patient Position: Sitting) 70 97.6 °F (36.4 °C) (Oral) 18 5' 3\" (1.6 m) 164 lb (74.4 kg) SpO2 BMI OB Status Smoking Status 97% 29.05 kg/m2 Hysterectomy Former Smoker Vitals History BMI and BSA Data Body Mass Index Body Surface Area 29.05 kg/m 2 1.82 m 2 Preferred Pharmacy Pharmacy Name Phone [id number], [address] [facility] [phone] Your Updated Medication List  
  
   
This list is accurate as of: 12/12/17  1:44 PM.  Always use your most recent med list.  
  
  
  
  
 etanercept 50 mg/mL (0.98 mL) injection Commonly known as:  ENBREL SURECLICK  
9.87 mL by SubCUTAneous route every seven (7) days. leflunomide 20 mg tablet Commonly known as:  Delta Candis Take 1 Tab by mouth daily for 90 days. Take half tab daily for 7 days and then one tab if tolerated  
  
 omeprazole 40 mg capsule Commonly known as:  PRILOSEC  
TAKE ONE CAPSULE BY MOUTH EVERY DAY  
  
 OTHER Take 1,200 mg by mouth daily. Indications: Calcium and Vitamin D3 1200 mg total  
  
 VITAMIN C 500 mg tablet Generic drug:  ascorbic acid (vitamin C) Take 500 mg by mouth daily. Takes 1 tab daily. vitamin E 400 unit capsule Commonly known as:  [address] Take 400 Units by mouth daily. Follow-up Instructions Return if symptoms worsen or fail to improve. To-Do List   
 06/20/2018 10:00 AM  
  Appointment with [facility] 2 at [facility] [address] ([phone]) NON-CONTRAST STUDY: 1. Bring any [facility] films/images pertaining to the area of interest with you on the day of appointment. 2. Check in at registration at least 30 minutes before appt time unless you were instructed to do otherwise. 3. If you have to drink oral contrast please pick it up any weekday prior to your appointment, if you cannot please check in 2 hrs  before appt time. Patient Instructions Home Blood Pressure Test: About This Test 
What is it? A home blood pressure test allows you to keep track of your blood pressure at home. Blood pressure is a measure of the force of blood against the walls of your arteries. Blood pressure readings include two numbers, such as 130/80 (say \"130 over 80\"). The first number is the systolic pressure. The second number is the diastolic pressure. Why is this test done? You may do this test at home to: · Find out if you have high blood pressure. · Track your blood pressure if you have high blood pressure. · Track how well medicine is working to reduce high blood pressure. · Check how lifestyle changes, such as weight loss and exercise, are affecting blood pressure. How can you prepare for the test? 
· Do not use caffeine, tobacco, or medicines known to raise blood pressure (such as nasal decongestant sprays) for at least 30 minutes before taking your blood pressure. · Do not exercise for at least 30 minutes before taking your blood pressure. What happens before the test? 
Take your blood pressure while you feel comfortable and relaxed. Sit quietly with both feet on the floor for at least 5 minutes before the test. 
What happens during the test? 
· Sit with your arm slightly bent and resting on a table so that your upper arm is at the same level as your heart. · Roll up your sleeve or take off your shirt to expose your upper arm. · Wrap the blood pressure cuff around your upper arm so that the lower edge of the cuff is about 1 inch above the bend of your elbow. Proceed with the following steps depending on if you are using an automatic or manual pressure monitor. Automatic blood pressure monitors · Press the on/off button on the automatic monitor and wait until the ready-to-measure \"heart\" symbol appears next to zero in the display window. · Press the start button. The cuff will inflate and deflate by itself. · Your blood pressure numbers will appear on the screen. · Write your numbers in your log book, along with the date and time. Manual blood pressure monitors · Place the earpieces of a stethoscope in your ears, and place the bell of the stethoscope over the artery, just below the cuff. · Close the valve on the rubber inflating bulb. · Squeeze the bulb rapidly with your opposite hand to inflate the cuff until the dial or column of mercury reads about 30 mm Hg higher than your usual systolic pressure. If you do not know your usual pressure, inflate the cuff to 210 mm Hg or until the pulse at your wrist disappears. · Open the pressure valve just slightly by twisting or pressing the valve on the bulb.  
· As you watch the pressure slowly fall, note the level on the dial at which you first start to hear a pulsing or tapping sound through the stethoscope. This is your systolic blood pressure. · Continue letting the air out slowly. The sounds will become muffled and will finally disappear. Note the pressure when the sounds completely disappear. This is your diastolic blood pressure. Let out all the remaining air. · Write your numbers in your log book, along with the date and time. What else should you know about the test? 
Results for adults ages 25 and older (mm Hg): · Normal (ideal): Systolic 012 or below. Diastolic 79 or below. · Prehypertension: Systolic 368 to 516. Diastolic 80 to 89. · Hypertension: Systolic 568 or above. Diastolic 90 or above. Follow-up care is a key part of your treatment and safety. Be sure to make and go to all appointments, and call your doctor if you are having problems. It's also a good idea to keep a list of the medicines you take. Where can you learn more? Go to http://jimmie-geronimo.info/. Enter C427 in the search box to learn more about \"Home Blood Pressure Test: About This Test.\" Current as of: September 21, 2016 Content Version: 11.4 © 5327-9264 [employer]. Care instructions adapted under license by [employer] (which disclaims liability or warranty for this information). Introducing [facility]! Mayra Brokos introduces [email or web address] patient portal. Now you can access parts of your medical record, email your doctor's office, and request medication refills online. 1. In your internet browser, go to [email or web address] 2. Click on the First Time User? Click Here link in the Sign In box. You will see the New Member Sign Up page.  
3. Enter your [email or web address] Access Code exactly as it appears below. You will not need to use this code after youve completed the sign-up process. If you do not sign up before the expiration date, you must request a new code. · [employer] Access Code: [email or web address] Expires: 3/12/2018  1:44 PM 
 
4. Enter the last four digits of your Social Security Number (xxxx) and Date of Birth (mm/dd/yyyy) as indicated and click Submit. You will be taken to the next sign-up page. 5. Create a [employer] ID. This will be your [employer] login ID and cannot be changed, so think of one that is secure and easy to remember. 6. Create a [employer] password. You can change your password at any time. 7. Enter your Password Reset Question and Answer. This can be used at a later time if you forget your password. 8. Enter your e-mail address. You will receive e-mail notification when new information is available in [address]. 9. Click Sign Up. You can now view and download portions of your medical record. 10. Click the Download Summary menu link to download a portable copy of your medical information. If you have questions, please visit the Frequently Asked Questions section of the [employer] website. Remember, [employer] is NOT to be used for urgent needs. For medical emergencies, dial 911. Now available from your iPhone and Android! Please provide this summary of care documentation to your next provider. Your primary care clinician is listed as Yina Estrada. If you have any questions after today's visit, please call [phone].

## 2017-12-15 ENCOUNTER — TELEPHONE (OUTPATIENT)
Dept: RHEUMATOLOGY | Age: 77
End: 2017-12-15

## 2017-12-15 NOTE — TELEPHONE ENCOUNTER
Faxed prior auth request to [address] [place] [address] Nicole for Enbrel [employer] for request for insurance approval. Also faxed [employer] forms in,to apply for Patient Assistance with Enbrel.  Patient forms for [employer] mailed to the patient, with instructions to mail back to the to be faxed in to the Patient Assistance program. Detail Level: Detailed

## 2017-12-29 ENCOUNTER — TELEPHONE (OUTPATIENT)
Dept: RHEUMATOLOGY | Age: 77
End: 2017-12-29

## 2017-12-29 NOTE — TELEPHONE ENCOUNTER
Spoke with pt who stated that she received her Enbrel today and she thought she wanted to make an appt to be shown how to do the injection, but then she remembered that her sister is on the medication and she is going to have her show her how to inject the medication tomorrow. I told her that if she was comfortable with doing that that she could have her show her how to do it, and if she had questions to call us back. She stated an understanding.

## 2018-01-09 ENCOUNTER — TELEPHONE (OUTPATIENT)
Dept: RHEUMATOLOGY | Age: 78
End: 2018-01-09

## 2018-01-10 ENCOUNTER — OFFICE VISIT (OUTPATIENT)
Dept: FAMILY MEDICINE CLINIC | Age: 78
End: 2018-01-10

## 2018-01-10 VITALS
DIASTOLIC BLOOD PRESSURE: 88 MMHG | TEMPERATURE: 97.4 F | HEART RATE: 80 BPM | BODY MASS INDEX: 30.33 KG/M2 | HEIGHT: 63 IN | SYSTOLIC BLOOD PRESSURE: 136 MMHG | WEIGHT: 171.2 LBS | OXYGEN SATURATION: 99 % | RESPIRATION RATE: 16 BRPM

## 2018-01-10 DIAGNOSIS — M25.561 CHRONIC PAIN OF BOTH KNEES: Primary | ICD-10-CM

## 2018-01-10 DIAGNOSIS — G89.29 CHRONIC PAIN OF BOTH KNEES: Primary | ICD-10-CM

## 2018-01-10 DIAGNOSIS — M25.562 CHRONIC PAIN OF BOTH KNEES: Primary | ICD-10-CM

## 2018-01-10 RX ORDER — PREDNISONE 20 MG/1
TABLET ORAL
Qty: 21 TAB | Refills: 0 | Status: SHIPPED | OUTPATIENT
Start: 2018-01-10 | End: 2018-01-30

## 2018-01-10 RX ORDER — PREDNISONE 20 MG/1
TABLET ORAL
Qty: 21 TAB | Refills: 0 | Status: SHIPPED | OUTPATIENT
Start: 2018-01-10 | End: 2018-01-10 | Stop reason: SDUPTHER

## 2018-01-10 RX ORDER — PREDNISONE 20 MG/1
TABLET ORAL
Qty: 21 TAB | Refills: 0
Start: 2018-01-10 | End: 2018-01-30

## 2018-01-10 NOTE — PROGRESS NOTES
Pt presents to office today for a left knee pain that she has been having for 3 weeks. Chief Complaint   Patient presents with    Knee Pain     Left side for 3 weeks, taking Tylenol,tingling and feels heavy. 1. Have you been to the ER, urgent care clinic since your last visit? Hospitalized since your last visit? No    2. Have you seen or consulted any other health care providers outside of the [address] [place] [address] since your last visit? Include any pap smears or colon screening.  No

## 2018-01-10 NOTE — PROGRESS NOTES
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 HPI: Patient reports bilateral knee pain for 1-2 weeks. Pain is radiating down her legs, she rate pain 10 /10, stating it keep me up at night. She has histroy of DJD and seropositive rheumatoid arthritis. Takes tylenol for pain, but states that tylenol is not helping this time  Past Medical History:   Diagnosis Date    Carpal tunnel syndrome     DJD (degenerative joint disease)     hip/spine lumbar spine with spinal stenosis     DJD (degenerative joint disease) of knees 4/9/2013    Ortho, [facility] Dr Génesis Kidd GERD (gastroesophageal reflux disease)     ILD (interstitial lung disease) ([facility] 6/14/2017    Osteopenia of multiple sites     Hip; 5/21/15     Post-menopausal     Primary osteoarthritis of both hands 5/27/2016    Primary osteoarthritis of both knees 5/27/2016    Pulmonary nodules 6/15/2016    Seropositive rheumatoid arthritis ([facility] 5/27/2016     Past Surgical History:   Procedure Laterality Date    ENDOSCOPY, COLON, DIAGNOSTIC  2007    -repeat in 5 years    HX CATARACT REMOVAL Right 10/2015    HX HYSTERECTOMY  1970's    w/partial ovariectomy r/t endometriosis by Dr. Johnny Mora  12/2003    bone stimulator Dr. Osmin Dai at [address] [facility]  10/2004   No Known Allergies    Current Outpatient Prescriptions:     predniSONE (DELTASONE) 20 mg tablet, Take 2 tab bid for three days, then 1 tab bid for three days, 1 tabs daily after that, Disp: 21 Tab, Rfl: 0    etanercept (ENBREL SURECLICK) 50 mg/mL (7.41 mL) injection, 0.98 mL by SubCUTAneous route every seven (7) days. , Disp: 3.98 mL, Rfl: 6    leflunomide (ARAVA) 20 mg tablet, Take 1 Tab by mouth daily for 90 days.  Take half tab daily for 7 days and then one tab if tolerated, Disp: 90 Tab, Rfl: 0    omeprazole (PRILOSEC) 40 mg capsule, TAKE ONE CAPSULE BY MOUTH EVERY DAY (Patient taking differently: TAKE ONE CAPSULE BY MOUTH AS NEEDED), Disp: 30 Cap, Rfl: 0    vitamin E (AQUA GEMS) 400 unit capsule, Take 400 Units by mouth daily. , Disp: , Rfl:     OTHER, Take 1,200 mg by mouth daily. Indications: Calcium and Vitamin D3 1200 mg total, Disp: , Rfl:     ascorbic acid, vitamin C, (VITAMIN C) 500 mg tablet, Take 500 mg by mouth daily. Takes 1 tab daily. , Disp: , Rfl:     predniSONE (DELTASONE) 20 mg tablet, Take 2 tab bid for 3 days , then 1 tab bid for 3 days, then 1 tab daily for three days, Disp: 21 Tab, Rfl: 0  Review of Systems   Constitutional: Negative. Respiratory: Negative. Cardiovascular: Negative. Gastrointestinal: Negative. Musculoskeletal: Positive for joint pain. Blood pressure 136/88, pulse 80, temperature 97.4 °F (36.3 °C), temperature source Oral, resp. rate 16, height 5' 3\" (1.6 m), weight 171 lb 3.2 oz (77.7 kg), SpO2 99 %. Physical Exam   Constitutional: No distress. HENT:   Mouth/Throat: Oropharynx is clear and moist.   Neck: Normal range of motion. Neck supple. Cardiovascular: Normal rate and regular rhythm. No murmur heard. Pulmonary/Chest: Breath sounds normal.   Abdominal: Soft. Bowel sounds are normal.   Musculoskeletal: She exhibits tenderness. She exhibits no edema or deformity. Knee show no swelling, no effusion   Nursing note and vitals reviewed. ASSESSMENT and PLAN  Diagnoses and all orders for this visit:    1. Chronic pain of both knees  -     predniSONE (DELTASONE) 20 mg tablet;  Take 2 tab bid for three days, then 1 tab bid for three days, 1 tabs daily after that    Follow up with rheumatologist, if not improved  Pt was given an after visit summary which includes diagnosis, current medicines and vital and voiced understanding of treatment plan

## 2018-01-12 DIAGNOSIS — K21.9 GASTROESOPHAGEAL REFLUX DISEASE WITHOUT ESOPHAGITIS: ICD-10-CM

## 2018-01-12 RX ORDER — OMEPRAZOLE 40 MG/1
CAPSULE, DELAYED RELEASE ORAL
Qty: 30 CAP | Refills: 0 | Status: SHIPPED | OUTPATIENT
Start: 2018-01-12 | End: 2018-01-30

## 2018-01-30 ENCOUNTER — OFFICE VISIT (OUTPATIENT)
Dept: FAMILY MEDICINE CLINIC | Age: 78
End: 2018-01-30

## 2018-01-30 VITALS
BODY MASS INDEX: 29.06 KG/M2 | RESPIRATION RATE: 16 BRPM | TEMPERATURE: 98 F | DIASTOLIC BLOOD PRESSURE: 86 MMHG | SYSTOLIC BLOOD PRESSURE: 128 MMHG | HEART RATE: 80 BPM | WEIGHT: 164 LBS | HEIGHT: 63 IN | OXYGEN SATURATION: 98 %

## 2018-01-30 DIAGNOSIS — Z79.60 LONG-TERM USE OF IMMUNOSUPPRESSANT MEDICATION: ICD-10-CM

## 2018-01-30 DIAGNOSIS — K21.9 GASTROESOPHAGEAL REFLUX DISEASE WITHOUT ESOPHAGITIS: ICD-10-CM

## 2018-01-30 DIAGNOSIS — M05.9 SEROPOSITIVE RHEUMATOID ARTHRITIS (HCC): Chronic | ICD-10-CM

## 2018-01-30 DIAGNOSIS — J40 BRONCHITIS: ICD-10-CM

## 2018-01-30 DIAGNOSIS — Z01.818 PRE-OP EVALUATION: Primary | ICD-10-CM

## 2018-01-30 RX ORDER — OMEPRAZOLE 40 MG/1
CAPSULE, DELAYED RELEASE ORAL
Qty: 30 CAP | Refills: 2 | Status: SHIPPED | OUTPATIENT
Start: 2018-01-30 | End: 2018-04-10 | Stop reason: SDUPTHER

## 2018-01-30 RX ORDER — AZITHROMYCIN 250 MG/1
TABLET, FILM COATED ORAL
Qty: 6 TAB | Refills: 0 | Status: SHIPPED | OUTPATIENT
Start: 2018-01-30 | End: 2018-02-04

## 2018-01-30 NOTE — PROGRESS NOTES
Pt presents to office today for a Pre-op exam for Cataract surgery in her left eye 2/6/2018. Chief Complaint   Patient presents with    Pre-op Exam     Left eye cataract surgery 2/6/2018    Cough     For 1 week     1. Have you been to the ER, urgent care clinic since your last visit? Hospitalized since your last visit? No    2. Have you seen or consulted any other health care providers outside of the [address] [place] [address] since your last visit? Include any pap smears or colon screening.  No

## 2018-01-30 NOTE — PROGRESS NOTES
Preoperative Evaluation    Date of Exam: 2018    aMry Fuentes is a 78 y.o. female (:1940) who presents for preoperative evaluation. Patient was treated for bronchitis with doxycyline in [facility], she didn't finished her antibiotics, few days later her symptoms returned, cough is better but still having some yellow mucus in the mornings. Will treat with ZPK     Latex Allergy: no    Medical History:     Past Medical History:   Diagnosis Date    Carpal tunnel syndrome     DJD (degenerative joint disease)     hip/spine lumbar spine with spinal stenosis     DJD (degenerative joint disease) of knees 2013    Ortho, [facility] Dr Aby Bustos GERD (gastroesophageal reflux disease)     ILD [facility]interstitial [facility] 2017    Osteopenia of multiple sites     Hip; 5/21/15     Post-menopausal     Primary osteoarthritis of both hands 2016    Primary osteoarthritis of both knees 2016    Pulmonary nodules 6/15/2016    [facility] 2016     Allergies:   No Known Allergies   Medications:     Current Outpatient Prescriptions   Medication Sig    omeprazole (PRILOSEC) 40 mg capsule TAKE ONE CAPSULE BY MOUTH EVERY DAY    azithromycin (ZITHROMAX) 250 mg tablet Take 2 tablets today, then take 1 tablet daily    etanercept (ENBREL SURECLICK) 50 mg/mL (5.47 mL) injection 0.98 mL by SubCUTAneous route every seven (7) days.  leflunomide (ARAVA) 20 mg tablet Take 1 Tab by mouth daily for 90 days. Take half tab daily for 7 days and then one tab if tolerated    vitamin E (AQUA GEMS) 400 unit capsule Take 400 Units by mouth daily.  OTHER Take 1,200 mg by mouth daily. Indications: Calcium and Vitamin D3 1200 mg total    ascorbic acid, vitamin C, (VITAMIN C) 500 mg tablet Take 500 mg by mouth daily. Takes 1 tab daily. No current facility-administered medications for this visit.       Surgical History:     Past Surgical History: Procedure Laterality Date    ENDOSCOPY, COLON, DIAGNOSTIC  2007    -repeat in 5 years    HX CATARACT REMOVAL Right 10/2015    HX HYSTERECTOMY  1970's    w/partial ovariectomy r/t endometriosis by Dr. Vinita Sánchez  12/2003    bone stimulator Dr. Jose Dean at [address]  10/2004     Social History:     Social History     Social History    Marital status:      Spouse name: N/A    Number of children: N/A    Years of education: N/A     Social History Main Topics    Smoking status: Former Smoker     Packs/day: 0.10     Types: Cigarettes     Quit date: 4/1/2014    Smokeless tobacco: Never Used      Comment: 40 years of .5 pack/day prior to quit 1/2012, relapsed.  Alcohol use No    Drug use: No    Sexual activity: Yes     Partners: Male     Other Topics Concern     Service No    Blood Transfusions No    Caffeine Concern No    Occupational Exposure No    Hobby Hazards No    Sleep Concern No    Stress Concern No    Weight Concern No    Special Diet No    Back Care No    Exercise Yes    Bike Helmet No    Seat Belt Yes    Self-Exams Yes     Social History Narrative       Anesthesia Complications: None  History of abnormal bleeding : None  History of Blood Transfusions: no  Health Care Directive or Living Will: yes    Objective:     ROS:   Feeling well. Except has occasional cough with productive mucus, No dyspnea or chest pain on exertion. No abdominal pain, change in bowel habits, black or bloody stools. No urinary tract symptoms. GYN ROS: no hot flashes. No neurological complaints. OBJECTIVE:   The patient appears well, alert, oriented x 3, in no distress. Visit Vitals    /86 (BP 1 Location: Left arm, BP Patient Position: Sitting)    Pulse 80    Temp 98 °F (36.7 °C) (Oral)    Resp 16    Ht 5' 3\" (1.6 m)    Wt 164 lb (74.4 kg)    SpO2 98%    BMI 29.05 kg/m2     HEENT:ENT normal.  Neck supple.  No adenopathy or thyromegaly. MARY. Chest: Lungs are clear, good air entry, no wheezes, rhonchi or rales. Cardiovascular: S1 and S2 normal, no murmurs, regular rate and rhythm. Abdomen: soft without tenderness, guarding, mass or organomegaly. Extremities: show no edema, normal peripheral pulses. Neurological: is normal, no focal findings. BREAST EXAM: not examined    PELVIC EXAM: examination not indicated    DIAGNOSTICS:   1. EKG: EKG FINDINGS - not done today  2. CXR: not done  3.  Labs: not doen today     IMPRESSION:   Low risk for planned surgery  No contraindications to planned surgery    Blanca Merino NP   1/30/2018

## 2018-01-30 NOTE — MR AVS SNAPSHOT
[address] [facility] 
 
 
 [address] [place] [address] 
[phone] Patient: Jeremiah Telles MRN: [id number] TFJ:3/72/2084 Visit Information Date & Time Provider Department Dept. Phone Encounter #  
 1/30/2018  3:00 PM Elias Car, 403 [facility] [phone] [id number] Your Appointments 2/14/2018 10:00 AM  
ESTABLISHED PATIENT with Dinorah Rutherford MD  
[address] ([facility]) [place] [address]  
[phone]  
  
   
 [address] [place] [id number]  
  
    
 3/13/2018  9:45 AM  
ROUTINE CARE with Kenneth Interiano MD  
[facility]) Appt Note: 3 month f/u  
 [address] [address]  
[phone]  
  
   
 [address] [address] Upcoming Health Maintenance Date Due COLONOSCOPY 2/7/2018 MEDICARE YEARLY EXAM 4/25/2018 GLAUCOMA SCREENING Q2Y 5/9/2018 DTaP/Tdap/Td series (2 - Td) 4/21/2024 Allergies as of 1/30/2018  Review Complete On: 1/30/2018 By: Kirit Jordan LPN No Known Allergies Current Immunizations  Reviewed on 11/16/2016 Name Date Influenza High Dose Vaccine PF 10/10/2016 Pneumococcal Polysaccharide (PPSV-23) 10/10/2016 Pneumococcal Vaccine (Pcv) 10/31/2007 Tdap 4/21/2014 Zoster Vaccine, Live 10/10/2016 Not reviewed this visit You Were Diagnosed With   
  
 Codes Comments Pre-op chest exam    -  Primary ICD-10-CM: Z01.303 ICD-9-CM: V72.82 Gastroesophageal reflux disease without esophagitis     ICD-10-CM: K21.9 ICD-9-CM: 530.81 Bronchitis     ICD-10-CM: J40 ICD-9-CM: 481 Seropositive rheumatoid arthritis ([facility] [address]     ICD-10-CM: M05.9 ICD-9-CM: 714.0 Long-term use of immunosuppressant medication     ICD-10-CM: Z79.899 ICD-9-CM: V58.69 Vitals  BP Pulse Temp Resp Height(growth percentile) Weight(growth percentile) 128/86 (BP 1 Location: Left arm, BP Patient Position: Sitting) 80 98 °F (36.7 °C) (Oral) 16 5' 3\" (1.6 m) 164 lb (74.4 kg) SpO2 BMI OB Status Smoking Status 98% 29.05 kg/m2 Hysterectomy Former Smoker Vitals History BMI and BSA Data Body Mass Index Body Surface Area 29.05 kg/m 2 1.82 m 2 Preferred Pharmacy Pharmacy Name Phone [facility]/PHARMACY #[id number]- [place], [address] [phone] Your Updated Medication List  
  
   
This list is accurate as of: 1/30/18  3:24 PM.  Always use your most recent med list.  
  
  
  
  
 azithromycin 250 mg tablet Commonly known as:  Tucker Dubin Take 2 tablets today, then take 1 tablet daily  
  
 etanercept 50 mg/mL (0.98 mL) injection Commonly known as:  ENBREL SURECLICK  
2.56 mL by SubCUTAneous route every seven (7) days. leflunomide 20 mg tablet Commonly known as:  Helder Baton Take 1 Tab by mouth daily for 90 days. Take half tab daily for 7 days and then one tab if tolerated  
  
 omeprazole 40 mg capsule Commonly known as:  PRILOSEC  
TAKE ONE CAPSULE BY MOUTH EVERY DAY  
  
 OTHER Take 1,200 mg by mouth daily. Indications: Calcium and Vitamin D3 1200 mg total  
  
 VITAMIN C 500 mg tablet Generic drug:  ascorbic acid (vitamin C) Take 500 mg by mouth daily. Takes 1 tab daily. vitamin E 400 unit capsule Commonly known as:  [address] Take 400 Units by mouth daily. Prescriptions Sent to Pharmacy Refills  
 omeprazole (PRILOSEC) 40 mg capsule 2 Sig: TAKE ONE CAPSULE BY MOUTH EVERY DAY Class: Normal  
 Pharmacy: [facility]/pharmacy #[id number]- [place], [address] Ph #: [phone]  
 azithromycin (ZITHROMAX) 250 mg tablet 0 Sig: Take 2 tablets today, then take 1 tablet daily  Class: Normal  
 Pharmacy[place] [id number] [address] [address] [facility] [address]  #: [phone] To-Do List   
 06/20/2018 10:00 AM  
  Appointment with [facility] CT ER 2 at [facility] RAD [address] ([phone]) NON-CONTRAST STUDY: 1. Bring any non [facility] facility films/images pertaining to the area of interest with you on the day of appointment. 2. Check in at registration at least 30 minutes before appt time unless you were instructed to do otherwise. 3. If you have to drink oral contrast please pick it up any weekday prior to your appointment, if you cannot please check in 2 hrs  before appt time. Introducing [facility] HEALTH SERVICES! Olivia Guidry introduces [employer] patient portal. Now you can access parts of your medical record, email your doctor's office, and request medication refills online. 1. In your internet browser, go to [email or web address] 2. Click on the First Time User? Click Here link in the Sign In box. You will see the New Member Sign Up page. 3. Enter your Axtria Access Code exactly as it appears below. You will not need to use this code after youve completed the sign-up process. If you do not sign up before the expiration date, you must request a new code. · Axtria Access Code: [email or web address] Expires: 3/12/2018  1:44 PM 
 
4. Enter the last four digits of your Social Security Number (xxxx) and Date of Birth (mm/dd/yyyy) as indicated and click Submit. You will be taken to the next sign-up page. 5. Create a Axtria ID. This will be your Axtria login ID and cannot be changed, so think of one that is secure and easy to remember. 6. Create a Axtria password. You can change your password at any time. 7. Enter your Password Reset Question and Answer. This can be used at a later time if you forget your password. 8. Enter your e-mail address. You will receive e-mail notification when new information is available in [address] 9. Click Sign Up. You can now view and download portions of your medical record. 10. Click the Download Summary menu link to download a portable copy of your medical information. If you have questions, please visit the Frequently Asked Questions section of the [employer] website. Remember, X[employer] is NOT to be used for urgent needs. For medical emergencies, dial 911. Now available from your iPhone and Android! Please provide this summary of care documentation to your next provider. Your primary care clinician is listed as Yina Estrada. If you have any questions after today's visit, please call [phone].

## 2018-02-05 ENCOUNTER — TELEPHONE (OUTPATIENT)
Dept: FAMILY MEDICINE CLINIC | Age: 78
End: 2018-02-05

## 2018-02-05 NOTE — TELEPHONE ENCOUNTER
----- Message from Du Santana sent at 2/2/2018  5:15 PM EST -----  Regarding: KYLIE Arias/Telephone  Patient is requesting her pre op test results be faxed to Dr. Madison Heller at the [facility]. Her appointment is Tuesday, February 6, 2018 at 11 am. Her number is [phone].

## 2018-02-12 ENCOUNTER — OFFICE VISIT (OUTPATIENT)
Dept: FAMILY MEDICINE CLINIC | Age: 78
End: 2018-02-12

## 2018-02-12 VITALS
DIASTOLIC BLOOD PRESSURE: 90 MMHG | HEIGHT: 63 IN | RESPIRATION RATE: 16 BRPM | TEMPERATURE: 97.7 F | BODY MASS INDEX: 28.84 KG/M2 | OXYGEN SATURATION: 96 % | SYSTOLIC BLOOD PRESSURE: 130 MMHG | HEART RATE: 84 BPM | WEIGHT: 162.8 LBS

## 2018-02-12 DIAGNOSIS — E66.3 OVER WEIGHT: ICD-10-CM

## 2018-02-12 DIAGNOSIS — I10 HTN (HYPERTENSION), BENIGN: Primary | ICD-10-CM

## 2018-02-12 RX ORDER — HYDROCHLOROTHIAZIDE 25 MG/1
25 TABLET ORAL DAILY
COMMUNITY
End: 2018-02-12 | Stop reason: SDUPTHER

## 2018-02-12 RX ORDER — HYDROCHLOROTHIAZIDE 25 MG/1
25 TABLET ORAL DAILY
Qty: 90 TAB | Refills: 1 | Status: SHIPPED | OUTPATIENT
Start: 2018-02-12 | End: 2019-02-25 | Stop reason: SDUPTHER

## 2018-02-12 NOTE — PATIENT INSTRUCTIONS
High Blood Pressure: Care Instructions Your Care Instructions If your blood pressure is usually above 140/90, you have high blood pressure, or hypertension. That means the top number is 140 or higher or the bottom number is 90 or higher, or both. Despite what a lot of people think, high blood pressure usually doesn't cause headaches or make you feel dizzy or lightheaded. It usually has no symptoms. But it does increase your risk for heart attack, stroke, and kidney or eye damage. The higher your blood pressure, the more your risk increases. Your doctor will give you a goal for your blood pressure. Your goal will be based on your health and your age. An example of a goal is to keep your blood pressure below 140/90. Lifestyle changes, such as eating healthy and being active, are always important to help lower blood pressure. You might also take medicine to reach your blood pressure goal. 
Follow-up care is a key part of your treatment and safety. Be sure to make and go to all appointments, and call your doctor if you are having problems. It's also a good idea to know your test results and keep a list of the medicines you take. How can you care for yourself at home? Medical treatment · If you stop taking your medicine, your blood pressure will go back up. You may take one or more types of medicine to lower your blood pressure. Be safe with medicines. Take your medicine exactly as prescribed. Call your doctor if you think you are having a problem with your medicine. · Talk to your doctor before you start taking aspirin every day. Aspirin can help certain people lower their risk of a heart attack or stroke. But taking aspirin isn't right for everyone, because it can cause serious bleeding. · See your doctor regularly. You may need to see the doctor more often at first or until your blood pressure comes down.  
· If you are taking blood pressure medicine, talk to your doctor before you take decongestants or anti-inflammatory medicine, such as ibuprofen. Some of these medicines can raise blood pressure. · Learn how to check your blood pressure at home. Lifestyle changes · Stay at a healthy weight. This is especially important if you put on weight around the waist. Losing even 10 pounds can help you lower your blood pressure. · If your doctor recommends it, get more exercise. Walking is a good choice. Bit by bit, increase the amount you walk every day. Try for at least 30 minutes on most days of the week. You also may want to swim, bike, or do other activities. · Avoid or limit alcohol. Talk to your doctor about whether you can drink any alcohol. · Try to limit how much sodium you eat to less than 2,300 milligrams (mg) a day. Your doctor may ask you to try to eat less than 1,500 mg a day. · Eat plenty of fruits (such as bananas and oranges), vegetables, legumes, whole grains, and low-fat dairy products. · Lower the amount of saturated fat in your diet. Saturated fat is found in animal products such as milk, cheese, and meat. Limiting these foods may help you lose weight and also lower your risk for heart disease. · Do not smoke. Smoking increases your risk for heart attack and stroke. If you need help quitting, talk to your doctor about stop-smoking programs and medicines. These can increase your chances of quitting for good. When should you call for help? Call 911 anytime you think you may need emergency care. This may mean having symptoms that suggest that your blood pressure is causing a serious heart or blood vessel problem. Your blood pressure may be over 180/110. ? For example, call 911 if: 
? · You have symptoms of a heart attack. These may include: ¨ Chest pain or pressure, or a strange feeling in the chest. 
¨ Sweating. ¨ Shortness of breath. ¨ Nausea or vomiting. ¨ Pain, pressure, or a strange feeling in the back, neck, jaw, or upper belly or in one or both shoulders or arms.  
¨ Lightheadedness or sudden weakness. ¨ A fast or irregular heartbeat. ? · You have symptoms of a stroke. These may include: 
¨ Sudden numbness, tingling, weakness, or loss of movement in your face, arm, or leg, especially on only one side of your body. ¨ Sudden vision changes. ¨ Sudden trouble speaking. ¨ Sudden confusion or trouble understanding simple statements. ¨ Sudden problems with walking or balance. ¨ A sudden, severe headache that is different from past headaches. ? · You have severe back or belly pain. ?Do not wait until your blood pressure comes down on its own. Get help right away. ?Call your doctor now or seek immediate care if: 
? · Your blood pressure is much higher than normal (such as 180/110 or higher), but you don't have symptoms. ? · You think high blood pressure is causing symptoms, such as: ¨ Severe headache. ¨ Blurry vision. ? Watch closely for changes in your health, and be sure to contact your doctor if: 
? · Your blood pressure measures 140/90 or higher at least 2 times. That means the top number is 140 or higher or the bottom number is 90 or higher, or both. ? · You think you may be having side effects from your blood pressure medicine. ? · Your blood pressure is usually normal, but it goes above normal at least 2 times. Where can you learn more? Go to http://jimmie-geronimo.info/. Enter B901 in the search box to learn more about \"High Blood Pressure: Care Instructions. \" Current as of: September 21, 2016 Content Version: 11.4 © 6948-9246 [employer]. Care instructions adapted under license by [employer] (which disclaims liability or warranty for this information). If you have questions about a medical condition or this instruction, always ask your healthcare professional. [place] [address] any warranty or liability for your use of this information.

## 2018-02-12 NOTE — MR AVS SNAPSHOT
Joyce Spaulding 
 
 
 [address] 
[phone] Patient: Jt Goodwin MRN: [id number] LVC:5/48/2394 Visit Information Date & Time Provider Department Dept. Phone Encounter #  
 2/12/2018  2:45 PM Angie Contreras [address] [phone] [id number] Your Appointments 2/14/2018 10:00 AM  
ESTABLISHED PATIENT with Mary Beth Georges MD  
[address] ([facility]) [place] [address] [facility] [address]  
  
   
 [place] [address] [address]  
  
    
 4/23/2018  9:00 AM  
Medicare Physical with Angie Contreras  [address] ([facility]) Appt Note: annual medicare wellness [address] [address]  
[phone]  
  
   
 [address] [address] Upcoming Health Maintenance Date Due COLONOSCOPY 2/7/2018 MEDICARE YEARLY EXAM 4/25/2018 GLAUCOMA SCREENING Q2Y 5/9/2018 DTaP/Tdap/Td series (2 - Td) 4/21/2024 Allergies as of 2/12/2018  Review Complete On: 1/30/2018 By: Angie Contreras NP No Known Allergies Current Immunizations  Reviewed on 11/16/2016 Name Date Influenza High Dose Vaccine PF 10/10/2016 Pneumococcal Polysaccharide (PPSV-23) 10/10/2016 Pneumococcal Vaccine (Pcv) 10/31/2007 Tdap 4/21/2014 Zoster Vaccine, Live 10/10/2016 Not reviewed this visit You Were Diagnosed With   
  
 Codes Comments HTN (hypertension), benign    -  Primary ICD-10-CM: I10 
ICD-9-CM: 401.1 Vitals BP Pulse Temp Resp Height(growth percentile) Weight(growth percentile) 130/90 (BP 1 Location: Left arm, BP Patient Position: Sitting) 84 97.7 °F (36.5 °C) (Oral) 16 5' 3\" (1.6 m) 162 lb 12.8 oz (73.8 kg) SpO2 BMI OB Status Smoking Status 96% 28.84 kg/m2 Hysterectomy Former Smoker Vitals History BMI and BSA Data Body Mass Index Body Surface Area  
 28.84 kg/m 2 1.81 m 2 Preferred Pharmacy Pharmacy Name Phone [facility]/PHARMACY #[id number]- [place] [employer] [facility] [employer] [phone] Your Updated Medication List  
  
   
This list is accurate as of: 2/12/18  2:46 PM.  Always use your most recent med list.  
  
  
  
  
 etanercept 50 mg/mL (0.98 mL) injection Commonly known as:  ENBREL SURECLICK  
9.96 mL by SubCUTAneous route every seven (7) days. hydroCHLOROthiazide 25 mg tablet Commonly known as:  HYDRODIURIL Take 1 Tab by mouth daily. Takes 1 tab daily. leflunomide 20 mg tablet Commonly known as:  Anthony Anival Take 1 Tab by mouth daily for 90 days. Take half tab daily for 7 days and then one tab if tolerated  
  
 omeprazole 40 mg capsule Commonly known as:  PRILOSEC  
TAKE ONE CAPSULE BY MOUTH EVERY DAY  
  
 OTHER Take 1,200 mg by mouth daily. Indications: Calcium and Vitamin D3 1200 mg total  
  
 VITAMIN C 500 mg tablet Generic drug:  ascorbic acid (vitamin C) Take 500 mg by mouth daily. Takes 1 tab daily. vitamin E 400 unit capsule Commonly known as:  [address] Take 400 Units by mouth daily. Prescriptions Sent to Pharmacy Refills  
 hydroCHLOROthiazide (HYDRODIURIL) 25 mg tablet 1 Sig: Take 1 Tab by mouth daily. Takes 1 tab daily. Class: Normal  
 Pharmacy: [facility]/pharmacy #[id number]- [place] [facility] Ph #: [phone] Route: Oral  
  
To-Do List   
 06/20/2018 10:00 AM  
  Appointment with [facility] CT ER 2 at [facility] RAD [address] ([phone]) NON-CONTRAST STUDY: 1. [facility] films/images pertaining to the area of interest with you on the day of appointment. 2. Check in at registration at least 30 minutes before appt time unless you were instructed to do otherwise.  3. If you have to drink oral contrast please pick it up any weekday prior to your appointment, if you cannot please check in 2 hrs  before appt time. Patient Instructions High Blood Pressure: Care Instructions Your Care Instructions If your blood pressure is usually above 140/90, you have high blood pressure, or hypertension. That means the top number is 140 or higher or the bottom number is 90 or higher, or both. Despite what a lot of people think, high blood pressure usually doesn't cause headaches or make you feel dizzy or lightheaded. It usually has no symptoms. But it does increase your risk for heart attack, stroke, and kidney or eye damage. The higher your blood pressure, the more your risk increases. Your doctor will give you a goal for your blood pressure. Your goal will be based on your health and your age. An example of a goal is to keep your blood pressure below 140/90. Lifestyle changes, such as eating healthy and being active, are always important to help lower blood pressure. You might also take medicine to reach your blood pressure goal. 
Follow-up care is a key part of your treatment and safety. Be sure to make and go to all appointments, and call your doctor if you are having problems. It's also a good idea to know your test results and keep a list of the medicines you take. How can you care for yourself at home? Medical treatment · If you stop taking your medicine, your blood pressure will go back up. You may take one or more types of medicine to lower your blood pressure. Be safe with medicines. Take your medicine exactly as prescribed. Call your doctor if you think you are having a problem with your medicine. · Talk to your doctor before you start taking aspirin every day. Aspirin can help certain people lower their risk of a heart attack or stroke. But taking aspirin isn't right for everyone, because it can cause serious bleeding. · See your doctor regularly.  You may need to see the doctor more often at first or until your blood pressure comes down. · If you are taking blood pressure medicine, talk to your doctor before you take decongestants or anti-inflammatory medicine, such as ibuprofen. Some of these medicines can raise blood pressure. · Learn how to check your blood pressure at home. Lifestyle changes · Stay at a healthy weight. This is especially important if you put on weight around the waist. Losing even 10 pounds can help you lower your blood pressure. · If your doctor recommends it, get more exercise. Walking is a good choice. Bit by bit, increase the amount you walk every day. Try for at least 30 minutes on most days of the week. You also may want to swim, bike, or do other activities. · Avoid or limit alcohol. Talk to your doctor about whether you can drink any alcohol. · Try to limit how much sodium you eat to less than 2,300 milligrams (mg) a day. Your doctor may ask you to try to eat less than 1,500 mg a day. · Eat plenty of fruits (such as bananas and oranges), vegetables, legumes, whole grains, and low-fat dairy products. · Lower the amount of saturated fat in your diet. Saturated fat is found in animal products such as milk, cheese, and meat. Limiting these foods may help you lose weight and also lower your risk for heart disease. · Do not smoke. Smoking increases your risk for heart attack and stroke. If you need help quitting, talk to your doctor about stop-smoking programs and medicines. These can increase your chances of quitting for good. When should you call for help? Call 911 anytime you think you may need emergency care. This may mean having symptoms that suggest that your blood pressure is causing a serious heart or blood vessel problem. Your blood pressure may be over 180/110. ? For example, call 911 if: 
? · You have symptoms of a heart attack. These may include: ¨ Chest pain or pressure, or a strange feeling in the chest. 
¨ Sweating. ¨ Shortness of breath. ¨ Nausea or vomiting.  
¨ Pain, pressure, or a strange feeling in the back, neck, jaw, or upper belly or in one or both shoulders or arms. ¨ Lightheadedness or sudden weakness. ¨ A fast or irregular heartbeat. ? · You have symptoms of a stroke. These may include: 
¨ Sudden numbness, tingling, weakness, or loss of movement in your face, arm, or leg, especially on only one side of your body. ¨ Sudden vision changes. ¨ Sudden trouble speaking. ¨ Sudden confusion or trouble understanding simple statements. ¨ Sudden problems with walking or balance. ¨ A sudden, severe headache that is different from past headaches. ? · You have severe back or belly pain. ?Do not wait until your blood pressure comes down on its own. Get help right away. ?Call your doctor now or seek immediate care if: 
? · Your blood pressure is much higher than normal (such as 180/110 or higher), but you don't have symptoms. ? · You think high blood pressure is causing symptoms, such as: ¨ Severe headache. ¨ Blurry vision. ? Watch closely for changes in your health, and be sure to contact your doctor if: 
? · Your blood pressure measures 140/90 or higher at least 2 times. That means the top number is 140 or higher or the bottom number is 90 or higher, or both. ? · You think you may be having side effects from your blood pressure medicine. ? · Your blood pressure is usually normal, but it goes above normal at least 2 times. Where can you learn more? Go to http://jimmie-geronimo.info/. Enter K143 in the search box to learn more about \"High Blood Pressure: Care Instructions. \" Current as of: September 21, 2016 Content Version: 11.4 © 7433-7205 [employer]. Care instructions adapted under license by [employer] (which disclaims liability or warranty for this information).  Introducing [facility] & HEALTH SERVICES! Carmenza Bryan introduces [employer] patient portal. Now you can access parts of your medical record, email your doctor's office, and request medication refills online. 1. In your internet browser, go to [email or web address] 2. Click on the First Time User? Click Here link in the Sign In box. You will see the New Member Sign Up page. 3. Enter your [employer] Access Code exactly as it appears below. You will not need to use this code after youve completed the sign-up process. If you do not sign up before the expiration date, you must request a new code. · [employer] Access Code: [email or web address] Expires: 3/12/2018  1:44 PM 
 
4. Enter the last four digits of your Social Security Number (xxxx) and Date of Birth (mm/dd/yyyy) as indicated and click Submit. You will be taken to the next sign-up page. 5. Create a [employer] ID. This will be your [employer] login ID and cannot be changed, so think of one that is secure and easy to remember. 6. Create a [employer] password. You can change your password at any time. 7. Enter your Password Reset Question and Answer. This can be used at a later time if you forget your password. 8. Enter your e-mail address. You will receive e-mail notification when new information is available in [address] 9. Click Sign Up. You can now view and download portions of your medical record. 10. Click the Download Summary menu link to download a portable copy of your medical information. If you have questions, please visit the Frequently Asked Questions section of the [employer] website. Remember, [employer] is NOT to be used for urgent needs. For medical emergencies, dial 911. Now available from your iPhone and Android! Please provide this summary of care documentation to your next provider. Your primary care clinician is listed as Prem FLORES.  If you have any questions after today's visit, please call [phone].

## 2018-02-12 NOTE — PROGRESS NOTES
Pt presents to office today for a follow up on BP. Pt states that she has had to go to [facility]  2/7/2018 and was started her on HCTZ 25 mg daily to control her BP. Pt wrote down her BP readings after her left eye surgery 2/6/2018. It was 195/114 and 190/110 both in left arm and 184/119 and 190/123 in right arm. . After 20 mg labetalol IV was given, 168/110 and 163/107. Chief Complaint Patient presents with  Blood Pressure Check Follow up. 1. Have you been to the ER, urgent care clinic since your last visit? Hospitalized since your last visit? No 
 
2. Have you seen or consulted any other health care providers outside of the [address] [place] [address] since your last visit? Include any pap smears or colon screening. Yes to [facility] 2/7/2018.

## 2018-02-12 NOTE — PROGRESS NOTES
HISTORY OF PRESENT ILLNESS Cesar Mcnamara is a 78 y.o. female. HPI: Hypertension :Patient is following up from [facility] for elevated blood pressure. Her blood pressures were running 186-195 over 107-119 right after her eye surgery. She was started on HCZT 25 mg to take one daily. Her current blood pressure is 130/90. Health Maintenance: Patient is over weight, she is watching her diet but not exercisng Past Medical History:  
Diagnosis Date  Carpal tunnel syndrome  DJD (degenerative joint disease)   
 hip/spine lumbar spine with spinal stenosis  DJD (degenerative joint disease) of knees 4/9/2013 Ortho, [facility] Dr Yehuda Castro  GERD (gastroesophageal reflux disease)  ILD (interstitial lung disease) ([facility] 6/14/2017  Osteopenia of multiple sites Hip; 5/21/15  Post-menopausal   
 Primary osteoarthritis of both hands 5/27/2016  Primary osteoarthritis of both knees 5/27/2016  Pulmonary nodules 6/15/2016  Seropositive rheumatoid arthritis ([facility] 5/27/2016 Past Surgical History:  
Procedure Laterality Date  ENDOSCOPY, COLON, DIAGNOSTIC  2007 -repeat in 5 years  HX CATARACT REMOVAL Right 10/2015  HX HYSTERECTOMY  1970's  
 w/partial ovariectomy r/t endometriosis by Dr. Garner Linker  HX LUMBAR FUSION  12/2003  
 bone stimulator Dr. Munir Mitchell at [facility]  IR INJ EPIDURAL LUMBAR SACRAL  10/2004 No Known Allergies Current Outpatient Prescriptions:  
  hydroCHLOROthiazide (HYDRODIURIL) 25 mg tablet, Take 1 Tab by mouth daily. Takes 1 tab daily. , Disp: 90 Tab, Rfl: 1 
  omeprazole (PRILOSEC) 40 mg capsule, TAKE ONE CAPSULE BY MOUTH EVERY DAY, Disp: 30 Cap, Rfl: 2 
  etanercept (ENBREL SURECLICK) 50 mg/mL (9.21 mL) injection, 0.98 mL by SubCUTAneous route every seven (7) days. , Disp: 3.98 mL, Rfl: 6 
  leflunomide (ARAVA) 20 mg tablet, Take 1 Tab by mouth daily for 90 days.  Take half tab daily for 7 days and then one tab if tolerated, Disp: 90 Tab, Rfl: 0 
  vitamin E (AQUA GEMS) 400 unit capsule, Take 400 Units by mouth daily. , Disp: , Rfl:  
  OTHER, Take 1,200 mg by mouth daily. Indications: Calcium and Vitamin D3 1200 mg total, Disp: , Rfl:  
  ascorbic acid, vitamin C, (VITAMIN C) 500 mg tablet, Take 500 mg by mouth daily. Takes 1 tab daily. , Disp: , Rfl:  
Review of Systems Constitutional: Negative. Respiratory: Negative. Cardiovascular: Negative. Gastrointestinal: Negative. Blood pressure 130/90, pulse 84, temperature 97.7 °F (36.5 °C), temperature source Oral, resp. rate 16, height 5' 3\" (1.6 m), weight 162 lb 12.8 oz (73.8 kg), SpO2 96 %. Body mass index is 28.84 kg/(m^2). Physical Exam  
Constitutional: No distress. HENT:  
Mouth/Throat: Oropharynx is clear and moist.  
Neck: Normal range of motion. Neck supple. Cardiovascular: Normal rate and regular rhythm. No murmur heard. Pulmonary/Chest: Effort normal and breath sounds normal.  
Abdominal: Soft. Bowel sounds are normal.  
Nursing note and vitals reviewed. ASSESSMENT and PLAN Diagnoses and all orders for this visit: 1. HTN (hypertension), benign 
-     hydroCHLOROthiazide (HYDRODIURIL) 25 mg tablet; Take 1 Tab by mouth daily. Takes 1 tab daily. 2. Over weight Normal BMI discussed, advised to continue to watch diet and exercise An After Visit Summary was printed and given to the patient.

## 2018-02-14 ENCOUNTER — OFFICE VISIT (OUTPATIENT)
Dept: RHEUMATOLOGY | Age: 78
End: 2018-02-14

## 2018-02-14 VITALS
HEIGHT: 63 IN | HEART RATE: 72 BPM | TEMPERATURE: 97.8 F | RESPIRATION RATE: 18 BRPM | BODY MASS INDEX: 28.63 KG/M2 | DIASTOLIC BLOOD PRESSURE: 85 MMHG | WEIGHT: 161.6 LBS | SYSTOLIC BLOOD PRESSURE: 129 MMHG | OXYGEN SATURATION: 95 %

## 2018-02-14 DIAGNOSIS — M05.9 SEROPOSITIVE RHEUMATOID ARTHRITIS (HCC): Primary | Chronic | ICD-10-CM

## 2018-02-14 DIAGNOSIS — M70.62 TROCHANTERIC BURSITIS OF BOTH HIPS: ICD-10-CM

## 2018-02-14 DIAGNOSIS — M17.0 PRIMARY OSTEOARTHRITIS OF BOTH KNEES: ICD-10-CM

## 2018-02-14 DIAGNOSIS — M19.041 PRIMARY OSTEOARTHRITIS OF BOTH HANDS: ICD-10-CM

## 2018-02-14 DIAGNOSIS — Z79.60 LONG-TERM USE OF IMMUNOSUPPRESSANT MEDICATION: ICD-10-CM

## 2018-02-14 DIAGNOSIS — M70.61 TROCHANTERIC BURSITIS OF BOTH HIPS: ICD-10-CM

## 2018-02-14 DIAGNOSIS — M85.89 OSTEOPENIA OF MULTIPLE SITES: ICD-10-CM

## 2018-02-14 DIAGNOSIS — G89.29 CHRONIC RIGHT SHOULDER PAIN: ICD-10-CM

## 2018-02-14 DIAGNOSIS — J84.9 ILD (INTERSTITIAL LUNG DISEASE) (HCC): ICD-10-CM

## 2018-02-14 DIAGNOSIS — M19.042 PRIMARY OSTEOARTHRITIS OF BOTH HANDS: ICD-10-CM

## 2018-02-14 DIAGNOSIS — M25.511 CHRONIC RIGHT SHOULDER PAIN: ICD-10-CM

## 2018-02-14 RX ORDER — TRIAMCINOLONE ACETONIDE 40 MG/ML
40 INJECTION, SUSPENSION INTRA-ARTICULAR; INTRAMUSCULAR ONCE
Qty: 1 ML | Refills: 0
Start: 2018-02-14 | End: 2018-02-14

## 2018-02-14 RX ORDER — LIDOCAINE HYDROCHLORIDE 10 MG/ML
1 INJECTION, SOLUTION EPIDURAL; INFILTRATION; INTRACAUDAL; PERINEURAL ONCE
Qty: 1 ML | Refills: 0
Start: 2018-02-14 | End: 2018-02-14

## 2018-02-14 NOTE — MR AVS SNAPSHOT
[address] 
[phone] Patient: Jakub Zafar MRN: [id number] FGV:[id number] Visit Information Date & Time Provider Department Dept. Phone Encounter #  
 2/14/2018 10:00 AM Rio Arana, [address] [facility] [address] [facility] [id number] Follow-up Instructions Return in about 3 months (around 5/14/2018). Your Appointments 4/23/2018  9:00 AM  
Medicare Physical with Milagros Dee  [facility] ([facility]) Appt Note: annual medicare wellness [address] [id number]  
[phone]  
  
   
 [address] [address] Upcoming Health Maintenance Date Due COLONOSCOPY 2/7/2018 MEDICARE YEARLY EXAM 4/25/2018 GLAUCOMA SCREENING Q2Y 5/9/2018 DTaP/Tdap/Td series (2 - Td) 4/21/2024 Allergies as of 2/14/2018  Review Complete On: 2/14/2018 By: Rio Arana MD  
 No Known Allergies Current Immunizations  Reviewed on 11/16/2016 Name Date Influenza High Dose Vaccine PF 10/10/2016 Pneumococcal Polysaccharide (PPSV-23) 10/10/2016 Pneumococcal Vaccine (Pcv) 10/31/2007 Tdap 4/21/2014 Zoster Vaccine, Live 10/10/2016 Not reviewed this visit You Were Diagnosed With   
  
 Codes Comments Seropositive rheumatoid arthritis ([facility]    -  Primary ICD-10-CM: M05.9 ICD-9-CM: 714.0 Long-term use of immunosuppressant medication     ICD-10-CM: Z79.899 ICD-9-CM: V58.69 ILD (interstitial lung disease) ([facility]     ICD-10-CM: J84.9 ICD-9-CM: 353 Osteopenia of multiple sites     ICD-10-CM: M85.89 ICD-9-CM: 733.90 Primary osteoarthritis of both hands     ICD-10-CM: M19.041, Q84.817 ICD-9-CM: 715.14 Primary osteoarthritis of both knees     ICD-10-CM: M17.0 ICD-9-CM: 715.16 Trochanteric bursitis of both hips     ICD-10-CM: M70.61, M70.62 ICD-9-CM: 726.5 Chronic right shoulder pain     ICD-10-CM: M25.511, G89.29 ICD-9-CM: 719.41, 338.29 Vitals BP Pulse Temp Resp Height(growth percentile) Weight(growth percentile) 129/85 (BP 1 Location: Left arm, BP Patient Position: Sitting) 72 97.8 °F (36.6 °C) (Oral) 18 5' 3\" (1.6 m) 161 lb 9.6 oz (73.3 kg) SpO2 BMI OB Status Smoking Status 95% 28.63 kg/m2 Hysterectomy Former Smoker Vitals History BMI and BSA Data Body Mass Index Body Surface Area  
 28.63 kg/m 2 1.81 m 2 Preferred Pharmacy Pharmacy Name Phone [id number]- [id number], [address] [phone] Your Updated Medication List  
  
   
This list is accurate as of: 2/14/18 10:21 AM.  Always use your most recent med list.  
  
  
  
  
 etanercept 50 mg/mL (0.98 mL) injection Commonly known as:  ENBREL SURECLICK  
0.43 mL by SubCUTAneous route every seven (7) days. hydroCHLOROthiazide 25 mg tablet Commonly known as:  HYDRODIURIL Take 1 Tab by mouth daily. Takes 1 tab daily. leflunomide 20 mg tablet Commonly known as:  Juanell Silence Take 1 Tab by mouth daily for 90 days. Take half tab daily for 7 days and then one tab if tolerated  
  
 lidocaine (PF) 10 mg/mL (1 %) injection Commonly known as:  XYLOCAINE  
1 mL by Intra artICUlar route once for 1 dose. omeprazole 40 mg capsule Commonly known as:  PRILOSEC  
TAKE ONE CAPSULE BY MOUTH EVERY DAY  
  
 OTHER Take 1,200 mg by mouth daily. Indications: Calcium and Vitamin D3 1200 mg total  
  
 triamcinolone acetonide 40 mg/mL injection Commonly known as:  KENALOG  
1 mL by Intra artICUlar route once for 1 dose. VITAMIN C 500 mg tablet Generic drug:  ascorbic acid (vitamin C) Take 500 mg by mouth daily. Takes 1 tab daily. vitamin E 400 unit capsule Commonly known as:  [address] Take 400 Units by mouth daily. We Performed the Following  C REACTIVE PROTEIN, QT [27577 CPT(R)] CBC WITH AUTOMATED DIFF [63062 CPT(R)] METABOLIC PANEL, COMPREHENSIVE [63242 CPT(R)] MI DRAIN/INJECT LARGE JOINT/BURSA [id number] CPT(R)] REFERRAL TO PHYSICAL THERAPY [JGW39 Custom] Comments:  
 Evaluate and treat for right shoulder pain SED RATE (ESR) [id number] CPT(R)] TRIAMCINOLONE ACETONIDE INJ [ [facility]] Follow-up Instructions Return in about 3 months (around 5/14/2018). To-Do List   
 06/20/2018 10:00 AM  
  Appointment with [facility] CT ER 2 at [facility] [address] ([phone]) NON-CONTRAST STUDY: 1. Bring any non [facility] facility films/images pertaining to the area of interest with you on the day of appointment. 2. Check in at registration at least 30 minutes before appt time unless you were instructed to do otherwise. 3. If you have to drink oral contrast please pick it up any weekday prior to your appointment, if you cannot please check in 2 hrs  before appt time. Referral Information Referral ID Referred By Referred To  
  
 [id number] Dorina Bryson Not Available Visits Status Start Date End Date 1 New Request 2/14/18 2/14/19 If your referral has a status of pending review or denied, additional information will be sent to support the outcome of this decision. Introducing [facility]! [facility] introduces Dazo patient portal. Now you can access parts of your medical record, email your doctor's office, and request medication refills online. 1. In your internet browser, go to [email or web address] 2. Click on the First Time User? Click Here link in the Sign In box. You will see the New Member Sign Up page. 3. Enter your Dazo Access Code exactly as it appears below. You will not need to use this code after youve completed the sign-up process. If you do not sign up before the expiration date, you must request a new code. · Dazo Access Code: [email or web address] Expires: 3/12/2018  1:44 PM 
 
4. Enter the last four digits of your Social Security Number (xxxx) and Date of Birth (mm/dd/yyyy) as indicated and click Submit. You will be taken to the next sign-up page. 5. Create a NoWait ID. This will be your NoWait login ID and cannot be changed, so think of one that is secure and easy to remember. 6. Create a NoWait password. You can change your password at any time. 7. Enter your Password Reset Question and Answer. This can be used at a later time if you forget your password. 8. Enter your e-mail address. You will receive e-mail notification when new information is available in [address] 9. Click Sign Up. You can now view and download portions of your medical record. 10. Click the Download Summary menu link to download a portable copy of your medical information. If you have questions, please visit the Frequently Asked Questions section of the NoWait website. Remember, NoWait is NOT to be used for urgent needs. For medical emergencies, dial 911. Now available from your iPhone and Android! Please provide this summary of care documentation to your next provider. Your primary care clinician is listed as Yamileth FLORES. If you have any questions after today's visit, please call [phone].

## 2018-02-14 NOTE — PROGRESS NOTES
REASON FOR VISIT This is a follow-up visit for Ms. Freddie Bangura for Seropositive Rheumatoid Arthritis and Osteopenia. Inflammatory arthritis phenotype includes: Anti-CCP positive: no 
Rheumatoid factor positive: yes (low titer 19.3) Erosive disease: no 
Extra-articular manifestations include: pericardial effusion, pulmonary nodules and GGO Immunosuppression Screening (11/14/2017): Quantiferon TB: negative PPD:  Not performed Hepatitis B: negative Hepatitis C: negative Therapy History includes: 
 
Current DMARD therapy include: leflunomide 20 mg daily, Enbrel (1/2018 - present) Prior DMARD therapy include: methotrexate 17.5 mg every Sunday (CKD) Discontinued DMARDs because of inefficacy: None Discontinued DMARDs because of side effects: None Immunizations:  
Immunization History Administered Date(s) Administered  Influenza High Dose Vaccine PF 10/10/2016  Pneumococcal Polysaccharide (PPSV-23) 10/10/2016  Pneumococcal Vaccine (Pcv) 10/31/2007  Tdap 04/21/2014  Zoster Vaccine, Live 10/10/2016 Active problems include: 
 
Patient Active Problem List  
Diagnosis Code  DJD (degenerative joint disease) M19.90  
 Osteopenia of multiple sites M85.89  Pericardial effusion I31.3  Seropositive rheumatoid arthritis ([facility] [address] M05.9  Primary osteoarthritis of both hands M19.041, M19.042  
 Primary osteoarthritis of both knees M17.0  Trochanteric bursitis of both hips M70.61, M70.62  
 Pulmonary nodules R91.8  Abnormal CT scan, chest R93.8  Long-term use of immunosuppressant medication Z79.899  ILD (interstitial lung disease) ([facility] [address] J84.9 HISTORY OF PRESENT ILLNESS 
 
Ms. Freddie Bangura returns for a follow-up visit. On her last visit, I continued her methotrexate incrementally to 17.5 mg every Sunday and started Lauren Reyes, which was denied by her insurance, so I started her on Enbrel which was approved in 1/2018.  I injected her right shoulder due to pain, with good tolerance with relief for 2 months. After reviewing her labs, I changed methotrexate to leflunomide due to her CKD. Today, she complains of aching and throbbing pain from her right shoulder in all her fingers. She has had this for the past 2 weeks. She had aching pain in her knees in [place] (January) that was persistent. She called her PCP who prescribed her prednisone which helped. The aching came back yesterday and now has improved. She has not been exercising or injured. She had hand pain yesterday but today they are better. She denies pain today, but has stiffness. She notes intermittent swelling in her thumbs and hands. She thinks Enbrel is helping. Last blood work was done on 11/14/2017 and did not reveal any significant adverse effects, except creatinine 1.05 mg/dL and eGFR 60. Most recent inflammatory markers from 11/14/2017 revealed a ESR 2 mm/hr (previously 20, 9, 4, 3, 10 mm/hr) and CRP 2.0 mg/L (previously 0.9, 1.0, 0.8, 1.3, 0.7 mg/dL). The patient has not had any interval hospital admissions, infections, or surgeries. REVIEW OF SYSTEMS A comprehensive review of systems was performed and pertinent results are documented in the HPI, review of systems is otherwise non-contributory. PAST MEDICAL HISTORY She has a past medical history of Carpal tunnel syndrome; DJD (degenerative joint disease); DJD (degenerative joint disease) of knees (4/9/2013); Eczema; GERD (gastroesophageal reflux disease); ILD (interstitial lung disease) ([address] (6/14/2017); Osteopenia of multiple sites; Post-menopausal; Primary osteoarthritis of both hands (5/27/2016); Primary osteoarthritis of both knees (5/27/2016); Pulmonary nodules (6/15/2016); and Seropositive rheumatoid arthritis ([address] (5/27/2016). FAMILY HISTORY Her family history includes Arthritis-osteo in her brother, father, sister, and sister; Elevated Lipids in her sister;  Heart Disease (age of onset: 72) in her mother; Hypertension in her brother, maternal aunt, and maternal uncle; Stroke in her maternal aunt and maternal uncle; Thyroid Disease in her sister. SOCIAL HISTORY She reports that she quit smoking about 3 years ago. Her smoking use included Cigarettes. She smoked 0.10 packs per day. She has never used smokeless tobacco. She reports that she does not drink alcohol or use illicit drugs. MEDICATIONS Current Outpatient Prescriptions Medication Sig Dispense Refill  triamcinolone acetonide (KENALOG) 40 mg/mL injection 1 mL by Intra artICUlar route once for 1 dose. 1 mL 0  
 lidocaine, PF, (XYLOCAINE) 10 mg/mL (1 %) injection 1 mL by Intra artICUlar route once for 1 dose. 1 mL 0  
 hydroCHLOROthiazide (HYDRODIURIL) 25 mg tablet Take 1 Tab by mouth daily. Takes 1 tab daily. 90 Tab 1  
 etanercept (ENBREL SURECLICK) 50 mg/mL (6.86 mL) injection 0.98 mL by SubCUTAneous route every seven (7) days. 3.98 mL 6  
 leflunomide (ARAVA) 20 mg tablet Take 1 Tab by mouth daily for 90 days. Take half tab daily for 7 days and then one tab if tolerated 90 Tab 0  
 vitamin E (AQUA GEMS) 400 unit capsule Take 400 Units by mouth daily.  OTHER Take 1,200 mg by mouth daily. Indications: Calcium and Vitamin D3 1200 mg total    
 ascorbic acid, vitamin C, (VITAMIN C) 500 mg tablet Take 500 mg by mouth daily. Takes 1 tab daily.  omeprazole (PRILOSEC) 40 mg capsule TAKE ONE CAPSULE BY MOUTH EVERY DAY 30 Cap 2 ALLERGIES No Known Allergies PHYSICAL EXAMINATION Visit Vitals  /85 (BP 1 Location: Left arm, BP Patient Position: Sitting)  Pulse 72  Temp 97.8 °F (36.6 °C) (Oral)  Resp 18  Ht 5' 3\" (1.6 m)  Wt 161 lb 9.6 oz (73.3 kg)  SpO2 95%  BMI 28.63 kg/m2 Body mass index is 28.63 kg/(m^2). General: Patient is alert, oriented x 3, not in acute distress HEENT:  
Sclerae are not injected and appear moist. 
Oral mucous membranes are moist, there are no ulcers present. There is no alopecia. Neck is supple Cardiovascular: 
Heart is regular rate and rhythm, no murmurs. Chest: 
Lungs are clear to auscultation bilaterally. No rhonchi, wheezes, or crackles. Extremities: 
Free of clubbing, cyanosis, edema Neurological exam: No focal sensory deficits, muscle strength is full in upper and lower extremities Skin exam: 
There are no rashes, no alopecia, no discoid lesions, no active Raynaud's, no livedo reticularis, no periungual erythema. Musculoskeletal exam: A comprehensive musculoskeletal exam was performed for all joints of each upper and lower extremity and assessed for swelling, tenderness and range of motion. Positive results are documented as below: 
  
Decreased ROM of right shoulder due to pain Bilateral Michael and Heberden nodes Bilateral knee crepitus without effusion but active synovitis (left more than right) Bilateral hallux valgus Hammer toes Left ankle swelling and pain Joint Count 2/14/2018 11/14/2017 9/14/2017 6/14/2017 3/14/2017 2/14/2017 11/16/2016 Patient pain (0-100) 0 5 (No Data) 0 0 0 0 MHAQ 0 0.5 0 0 0 0 0 Left shoulder - Tender 1 - - - - - - Left elbow - Tender - 1 1 - - - - Left elbow - Swollen - 1 - - - - - Left wrist- Tender - - - 0 0 0 - Left wrist- Swollen 1 1 1 1 1 1 1 Left 1st MCP - Tender - 1 1 - - - - Left 1st MCP - Swollen 1 1 1 1 - - - Left 2nd MCP - Tender - 1 - - - - - Left 2nd MCP - Swollen 1 1 1 1 - - - Left 3rd MCP - Tender - 1 - - - - - Left 3rd MCP - Swollen 1 1 - 1 - - - Left 4th MCP - Swollen 1 - - - - - - Left 5th MCP - Tender - 1 - - - - - Left 5th MCP - Swollen - 1 - 1 - - 1 Left thumb IP - Tender - 1 - - - - - Left thumb IP - Swollen - - - 1 - - - Left 2nd PIP - Tender 1 1 - - - - - Left 2nd PIP - Swollen 1 1 - 1 - - - Left 3rd PIP - Tender 1 1 - - - - - Left 3rd PIP - Swollen 1 1 - 1 1 - - Left knee - Tender - 1 1 - - - - Left knee - Swollen - 1 1 - - - - Right shoulder - Tender 1 1 - - - - - Right elbow - Tender - 1 1 - - - - Right elbow - Swollen - 1 - - - - - Right wrist- Tender - 1 - - - - - Right wrist- Swollen 1 1 1 1 - 1 1 Right 1st MCP - Tender 1 1 1 - - - - Right 1st MCP - Swollen 1 1 1 1 - 1 - Right 2nd MCP - Tender - 1 - - - - - Right 2nd MCP - Swollen - 1 - 1 - - 1 Right 3rd MCP - Tender - 1 - - - - - Right 3rd MCP - Swollen 1 - - 1 - - - Right 4th MCP - Swollen 1 - - - - - - Right 5th MCP - Tender - 1 - - - - - Right 5th MCP - Swollen - 1 - 1 - 1 1 Right thumb IP - Tender - 1 - - - - - Right 2nd PIP - Tender 1 1 1 - - - - Right 2nd PIP - Swollen 1 1 1 1 - 1 - Right 3rd PIP - Tender - 1 - - - - - Right 3rd PIP - Swollen 1 1 1 1 1 - - Right 4th PIP - Tender - - - - - - - Right 4th PIP - Swollen - 1 - - - - - Right 5th PIP - Tender - 1 - - - - - Right knee - Tender - 1 - - - - - Right knee - Swollen - - 1 - - - - Tender Joint Count (Total) 6 21 6 0 0 0 - Swollen Joint Count (Total) 13 17 9 15 3 5 - Physician Assessment (0-10) 4 4 3 2 1 2 2 Patient Assessment (0-10) 0 0.5 (No Data) 0 0.5 0.5 1 CDAI Total (calculated) 23 42.5 - 17 4.5 7.5 - DATA REVIEW Laboratory The following laboratory results were reviewed and discussed with the patient: 
 
Office Visit on 11/14/2017 Component Date Value  WBC 11/14/2017 6.1  RBC 11/14/2017 4.64   
 HGB 11/14/2017 14.8  HCT 11/14/2017 44.5  MCV 11/14/2017 96   
 MCH 11/14/2017 31.9  MCHC 11/14/2017 33.3  RDW 11/14/2017 15.1  PLATELET 33/02/4160 009   
 NEUTROPHILS 11/14/2017 65  Lymphocytes 11/14/2017 22   
 MONOCYTES 11/14/2017 10   
 EOSINOPHILS 11/14/2017 2   
 BASOPHILS 11/14/2017 1  ABS. NEUTROPHILS 11/14/2017 3.9  Abs Lymphocytes 11/14/2017 1.4  ABS. MONOCYTES 11/14/2017 0.6  ABS. EOSINOPHILS 11/14/2017 0.1  ABS. BASOPHILS 11/14/2017 0.0   
 IMMATURE GRANULOCYTES 11/14/2017 0  ABS. IMM. GRANS. 11/14/2017 0.0  Glucose 11/14/2017 79   
 BUN 11/14/2017 16   
 Creatinine 11/14/2017 1.05*  GFR est non-AA 11/14/2017 52*  GFR est AA 11/14/2017 60   
 BUN/Creatinine ratio 11/14/2017 15  Sodium 11/14/2017 142  Potassium 11/14/2017 5.3*  Chloride 11/14/2017 102   
 CO2 11/14/2017 31*  Calcium 11/14/2017 9.6  Protein, total 11/14/2017 6.6  Albumin 11/14/2017 4.0   
 GLOBULIN, TOTAL 11/14/2017 2.6  A-G Ratio 11/14/2017 1.5  Bilirubin, total 11/14/2017 0.7  Alk. phosphatase 11/14/2017 70   
 AST (SGOT) 11/14/2017 33   
 ALT (SGPT) 11/14/2017 35*  C-Reactive Protein, Qt 11/14/2017 2.0  Sed rate (ESR) 11/14/2017 2   
 Hep B surface Ag screen 11/14/2017 Negative  Hepatitis Be Antigen 11/14/2017 Negative  Hep B Core Ab, IgM 11/14/2017 Negative  Hep B Core Ab, total 11/14/2017 Negative  Hepatitis Be Antibody 11/14/2017 Negative  HEP B SURFACE AB, QUAL 11/14/2017 Non Reactive  HCV Ab 11/14/2017 <0.1  QuantiFERON Incubation 11/14/2017 Value:Incubated, specimen forwarded to [place], [place] for 
completion of the assay.  Comment 11/14/2017 Comment  QuantiFERON TB Gold 11/14/2017 Negative  QUANTIFERON CRITERIA 11/14/2017 Comment  QuantiFERON TB Ag Value 11/14/2017 0.03   
 QuantiFERON Nil Value 11/14/2017 0.03   
 QuantiFERON Mitogen Value 11/14/2017 6.68   
 QFT TB Ag minus Nil Value 11/14/2017 0.00  Interpretation: 11/14/2017 Comment Imaging Musculoskeletal Ultrasound Ultrasound of the right wrist. Indication: joint swelling. (5/27/2016) Using a [email or web address] e with 18 Mhz probe, limited views of the right wrist were obtained. This revealed hypoechoic collection without doppler within the midcarpal joint space. The tendons were normal. Bony contours were regular without erosions seen. There were no soft tissue masses noted. Impression: synovial hypertrophy Radiographs Bilateral Hand 5/27/2016: Moderate bilateral triscaphe joint osteoarthritis. Moderate left and mild right first MC joint osteoarthritis. Moderate bilateral first MCP joint osteoarthritis. Multifocal IP joint osteoarthritis is bilateral. There are central erosions in the right first through fourth DIP joints and left third and fifth MP joints. Subtle marginal erosions in the left second DIP joint. No fracture or dislocation on plain film. Alignment is within normal limits. Bone mineralization is decreased. No soft tissue calcification. Bilateral Shoulder 5/27/2016: LEFT: demonstrate mild osteopenia. No acute fracture or dislocation. Age-appropriate AC joint osteoarthritis with marginal osteophytes. Glenohumeral joint is within normal limits for age. No evidence of erosion. RIGHT: demonstrate osteopenia. No acute fracture or dislocation. AC joint osteoarthritis includes marginal osteophytes. Glenohumeral joint osteoarthritis is age-appropriate. Cortical irregularity of the greater tuberosity of the humerus indicates underlying rotator cuff pathology Left Knee 12/29/2015: no fracture. Joint effusion and prepatellar soft tissue contusion versus bursitis. Increased osteoarthritis. Lumbar Spine 12/29/2015: stable dextroconvex scoliosis without evidence of acute fracture or subluxation. Significant multilevel degenerative disc disease is again noted from L2-S1. The patient is status post laminectomy from L1 through to the sacrum. Multilevel facet degenerative arthritis again noted. Left Knee 8/03/2010: sclerosis and deformity of the lateral tibial plateau with associated lateral compartment osteoarthritis is suggestive of old injury. No acute fracture is seen. There is moderate to severe patellofemoral  
 
CT Imaging CT Chest without contrast 6/20/2017: The style windows demonstrate no adenopathy. There continues to be a moderate pericardial effusion which is unchanged when compared with 7/22/2016. No thyroid nodules.  No axillary adenopathy. No adrenal lesions. There is a small moderate hiatal hernia. Review of bone windows demonstrates no destructive lesions. Lung windows reveal a stable small solid nodule in the right middle lobe laterally. The ill-defined semisolid groundglass opacity anteriorly in left lower lobe is unchanged when compared with the CT of 3/12/2015. This should be evaluated on continue follow-up. Follow-up exam in one year is suggested. No new nodules are noted. The high-resolution images demonstrate mild changes of centrilobular emphysema. There is no evidence for fibrosis or bronchiectasis. No significant interstitial lung disease. CT Chest without contrast 7/22/2016: Small lingular and right middle lobe nodules and left lower lobe groundglass opacity all unchanged. This represents a 16 month follow-up examination. The small middle lobe nodule and groundglass opacity in the left lower lobe are essentially unchanged in comparison with 3/9/2011. CT Chest with contrast 1/27/2016: nodules in both lungs including a soft more groundglass appearance nodule in the left lower lobe are stable when compared to the previous examination. No new acute finding or new nodule. Small pericardial effusion. Hiatal hernia. CT Chest without contrast 6/15/2015: stable semisolid nodule anteriorly in left lower lobe. The other nodules are stable. Decreasing pericardial effusion. Stable adenopathy. She underwent a fine needle biopsy which showed abundant benign and reactive bronchial epithelial cells and no malignancy. CT Chest without contrast 5/13/2015: a suspicious semisolid nodule in the anterior segment of the left lower lobe just posterior to the fissure with some retraction of the fissure. This measures 1.2 x 1 x 1.4 cm. PET/CT negative for an 4/10/2015. Moderate stable pericardial effusion. Stable enlarged lymph nodes as described. Several other small pulmonary nodules also stable.  
 
PET/CT Scan 4/10/2015: showed the vague semisolid nodule in the left lower lobe demonstrates no increased metabolic activity on PET suggesting benign etiology but is nonspecific and neoplasm is not entirely excluded. There are 2 lymph nodes in the right neck which are normal in size and demonstrate slight increased metabolic activity nonspecific most likely reactive. CT Heart without contrast with calcium 3/12/2015: total calcium score of 0. A low score suggests a low likelihood of coronary disease but does not exclude the possibility of significant coronary artery narrowing. 3 mm right middle lobe lung nodule unchanged. Pericardial effusion. Hiatal hernia. A CT chest without contrast showed a suspicious semisolid nodule in the anterior segment of the left lower lobe just posterior to the fissure with some retraction of the fissure. This measures 1.2 x 1 x 1.4 cm. This could represent a small bronchogenic carcinoma. Moderate pericardial effusion clinical correlation is recommended. 2 enlarged lymph nodes as described. Several other small pulmonary nodules can be evaluated on followup 23X 
 
CT Heart without contrast with calcium 3/09/2011: total calcium score of 0. A low score suggests a low likelihood of coronary disease but does not exclude the possibility of significant coronary artery narrowing. Small pericardial effusion. 3 mm pulmonary nodule right middle lobe. 7 mm density left lower lobe. MR Imaging None DXA DXA  10/06/2017: (excluded None) showed lumbar spine L1-L4 T score 1.2 (BMD 1.324 g/cm2), left femoral neck T score: -2.3 (0.721 g/cm2), left total hip T score: -2.1 (0.748 g/cm2), right femoral neck T score: -2.1 (0.749g/cm2), right total hip T score: -1.4 (0.834 g/cm2), and distal one third left radius T score N/A (BMD N/A g/cm2). FRAX score N/A % probability in 10 years for major osteoporotic fracture and N/A % 10 year probability of hip fracture.  
 
DXA 5/21/2015: lumbar spine L1-L4 T score 1.7 (BMD 1.389 g/cm2), left femoral neck T score: -2.2 (0.728 g/cm2), right femoral neck  T score: -1.4 (0.825 g/cm2), total hip T score: -2.2 (0.728 g/cm2). Echocardiogram 
 
Echocardiogram 6/03/2015: LEFT VENTRICLE: Size was normal. Systolic function was normal. Ejection fraction was estimated in the range of 60 % to 65 %. There were no regional wall motion abnormalities. Wall thickness was mildly increased. DOPPLER: Doppler parameters were consistent with abnormal left ventricular relaxation (grade 1 diastolic dysfunction). RIGHT VENTRICLE: The size was normal. Systolic function was normal. LEFT ATRIUM: Size was normal. LA volume index was 23 ml/m squared. ATRIAL SEPTUM: There was a small atrial septal aneurysm. There was no evidence of shunt on color flow imaging. RIGHT ATRIUM: Size was normal. MITRAL VALVE: Normal valve structure. DOPPLER: There was trivial regurgitation. AORTIC VALVE: Normal valve structure. DOPPLER: Transaortic velocity was within the normal range. There was no stenosis. There was no significant regurgitation. TRICUSPID VALVE: Normal valve structure. DOPPLER: There was mild regurgitation. Right atrial pressure estimate: 5 mmHg. Pulmonary artery systolic pressure: 25 mmHg. There was no pulmonary hypertension. PULMONIC VALVE: Not well visualized. DOPPLER: The transpulmonic velocity was within the normal range. There was trivial regurgitation. AORTA: The root exhibited normal size. SYSTEMIC VEINS: IVC: The inferior vena cava was normal in size. PERICARDIUM: A small pericardial effusion was identified along the right atrial free wall. There was no evidence of hemodynamic compromise. PFT 
 
PFT 6/20/2017: FVC of 1.78 (>80%), FEV1 of 1.31 (85%), FEV1/FVC of 73.82% and a DLCO of 19.65 (116%). PATHOLOGY Fine Need Aspirate of LLL 6/15/2015: Abundant benign and reactive bronchial epithelial cells. No cells diagnostic for malignancy PROCEDURE Right Shoulder Kenalog 40 mg IA. (11/14/17) Bilateral Trochanteric Bursitis Kenalog 40 mg IB. (02/14/17) Bilateral Knee Kenalog 40 mg IA. (11/16/16) Indications:  
Symptom relief from Right Shoulder Arthritis. (2/14/18) Procedure: After consent was obtained, and timeout performed, using sterile technique the Right Shouder joint was prepped using Chlorprep. Local anesthetic used: Ethyl Chloride. The joint was entered and 0 ml's of fluid was withdrawn. Kenalog 40 mg was mixed with 1% lidocaine 1 ml and injected into the joint and the needle withdrawn. The procedure was well tolerated. The patient was asked to continue to rest the joint for a few more days before resuming regular activities. It may be more painful for the first 1-2 days. Watch for fever, or increased swelling or persistent pain in the joint. Call or return to clinic prn if such symptoms occur or there is failure to improve as anticipated. ASSESSMENT AND PLAN This is a follow-up visit for Ms. Zarina Gonzales. 1) Seropositive Rheumatoid Arthritis with Interstitial Lung Disease. Today, her CDAI 23 (previously 42.5, N/A 17, 4.5, 7.5, 8, 16, 15.5, 19), with 6 tender and 13 swollen, consistent with high disease activity. She is maintained on leflunomide 20 mg daily and Enbrel weekly, with good tolerance. She started Enbrel in 1/2018 and I had switched her form methotrexate due to chronic kidney disease. I injected her right shoulder today with good tolerance and gave her a referral to PT. I will wait another 3 months before determining Enbrel failure. I ordered labs today. 2) Long Term Use of Immunosuppressants. The patient remains on immunomodulatory medications (methotrexate, Enbrel) and requires frequent toxicity monitoring by blood work. Respective labs were ordered (CBC and CMP). 3) Bilateral Hands and Knee Osteoarthritis. This was not an active issue today. 4) Bilateral Trochanteric Bursitis. This was not an active issue today. 5) Osteopenia.  Her 10/06/2017 DXA showed left femoral neck T score: -2.3 (0.721 g/cm2). I will continue to monitor. 6) Pericardial Effusion. This is chronic and asymptomatic. 7) Pulmonary Nodule and Ground Glass Opacities. The ill-defined semisolid groundglass opacity anteriorly in left lower lobe is unchanged when compared with the CT of 3/12/2015. CT Chest and PFTs did not show active disease or pulmonary compromise. 8) Chronic Lower Back Pain. She has a spinal cord stimulator. This was not an active issue today. 9) CKD Stage 2. Her creatinine was 1.05 mg/dL and eGFR 60 (previously 1.02, 0.88 mg/dL and eGFR 54, 75). I will repeat today. The patient voiced understanding of the aforementioned assessment and plan. Summary of plan was provided in the After Visit Summary patient instructions. TODAY'S ORDERS Orders Placed This Encounter  CBC WITH AUTOMATED DIFF  
 METABOLIC PANEL, COMPREHENSIVE  C REACTIVE PROTEIN, QT  
 SED RATE (ESR)  REFERRAL TO PHYSICAL THERAPY  TRIAMCINOLONE ACETONIDE INJ  20610 - DRAIN/INJECT LARGE JOINT/BURSA  triamcinolone acetonide (KENALOG) 40 mg/mL injection  lidocaine, PF, (XYLOCAINE) 10 mg/mL (1 %) injection Future Appointments Date Time Provider Franny Moralesi 4/23/2018 9:00 AM Pastora Estrada NP [facility]  
5/23/2018 10:00 AM Aliya Mcmahon MD [facility] [address]  
6/20/2018 10:00 AM [facility] PSYCHIATRIC [place] [facility] ER 2 [facility]CT . [facility] [address] Daniel Bar MD, [facility] Adult Rheumatology Musculoskeletal Ultrasound Certified [address] [place] [address] [id number] [place] [address] Phone [phone] Fax [phone]

## 2018-02-15 ENCOUNTER — TELEPHONE (OUTPATIENT)
Dept: RHEUMATOLOGY | Age: 78
End: 2018-02-15

## 2018-02-15 LAB
ALBUMIN SERPL-MCNC: 4.3 G/DL (ref 3.5–4.8)
ALBUMIN/GLOB SERPL: 1.4 {RATIO} (ref 1.2–2.2)
ALP SERPL-CCNC: 80 IU/L (ref 39–117)
ALT SERPL-CCNC: 19 IU/L (ref 0–32)
AST SERPL-CCNC: 23 IU/L (ref 0–40)
BASOPHILS # BLD AUTO: 0 X10E3/UL (ref 0–0.2)
BASOPHILS NFR BLD AUTO: 1 %
BILIRUB SERPL-MCNC: 0.7 MG/DL (ref 0–1.2)
BUN SERPL-MCNC: 15 MG/DL (ref 8–27)
BUN/CREAT SERPL: 14 (ref 12–28)
CALCIUM SERPL-MCNC: 9.7 MG/DL (ref 8.7–10.3)
CHLORIDE SERPL-SCNC: 97 MMOL/L (ref 96–106)
CO2 SERPL-SCNC: 24 MMOL/L (ref 18–29)
CREAT SERPL-MCNC: 1.07 MG/DL (ref 0.57–1)
CRP SERPL-MCNC: 0.6 MG/L (ref 0–4.9)
EOSINOPHIL # BLD AUTO: 0.2 X10E3/UL (ref 0–0.4)
EOSINOPHIL NFR BLD AUTO: 3 %
ERYTHROCYTE [DISTWIDTH] IN BLOOD BY AUTOMATED COUNT: 13.5 % (ref 12.3–15.4)
ERYTHROCYTE [SEDIMENTATION RATE] IN BLOOD BY WESTERGREN METHOD: 17 MM/HR (ref 0–40)
GFR SERPLBLD CREATININE-BSD FMLA CKD-EPI: 51 ML/MIN/1.73
GFR SERPLBLD CREATININE-BSD FMLA CKD-EPI: 59 ML/MIN/1.73
GLOBULIN SER CALC-MCNC: 3.1 G/DL (ref 1.5–4.5)
GLUCOSE SERPL-MCNC: 84 MG/DL (ref 65–99)
HCT VFR BLD AUTO: 49.2 % (ref 34–46.6)
HGB BLD-MCNC: 16 G/DL (ref 11.1–15.9)
IMM GRANULOCYTES # BLD: 0 X10E3/UL (ref 0–0.1)
IMM GRANULOCYTES NFR BLD: 0 %
LYMPHOCYTES # BLD AUTO: 2.1 X10E3/UL (ref 0.7–3.1)
LYMPHOCYTES NFR BLD AUTO: 33 %
MCH RBC QN AUTO: 30.1 PG (ref 26.6–33)
MCHC RBC AUTO-ENTMCNC: 32.5 G/DL (ref 31.5–35.7)
MCV RBC AUTO: 93 FL (ref 79–97)
MONOCYTES # BLD AUTO: 0.8 X10E3/UL (ref 0.1–0.9)
MONOCYTES NFR BLD AUTO: 12 %
NEUTROPHILS # BLD AUTO: 3.3 X10E3/UL (ref 1.4–7)
NEUTROPHILS NFR BLD AUTO: 51 %
PLATELET # BLD AUTO: 213 X10E3/UL (ref 150–379)
POTASSIUM SERPL-SCNC: 3.9 MMOL/L (ref 3.5–5.2)
PROT SERPL-MCNC: 7.4 G/DL (ref 6–8.5)
RBC # BLD AUTO: 5.32 X10E6/UL (ref 3.77–5.28)
SODIUM SERPL-SCNC: 142 MMOL/L (ref 134–144)
WBC # BLD AUTO: 6.5 X10E3/UL (ref 3.4–10.8)

## 2018-02-15 NOTE — PROGRESS NOTES
The results were reviewed and a letter was sent. Stable chronic kidney disease. Hematocrit level elevated. Will repeat on follow up.

## 2018-02-16 ENCOUNTER — TELEPHONE (OUTPATIENT)
Dept: FAMILY MEDICINE CLINIC | Age: 78
End: 2018-02-16

## 2018-02-16 NOTE — TELEPHONE ENCOUNTER
Patient is calling, she states that her hydrochlorothiazide was never called in to the pharmacy on file. She is requesting that this be done as soon as possible as she was in on the 12th and has been waiting since then.      Best call back # for patient: [phone]  Pharmacy on file verified

## 2018-02-20 ENCOUNTER — TELEPHONE (OUTPATIENT)
Dept: RHEUMATOLOGY | Age: 78
End: 2018-02-20

## 2018-02-20 NOTE — TELEPHONE ENCOUNTER
Spoke with patient after using 2 identifiers regarding her approval from [facility] for free Enbrel from 2/19/2018-12/31/2018. Patient given the phone number to Parviz Li [phone], Case # [id number].

## 2018-02-26 DIAGNOSIS — M05.9 SEROPOSITIVE RHEUMATOID ARTHRITIS (HCC): Chronic | ICD-10-CM

## 2018-02-26 RX ORDER — LEFLUNOMIDE 20 MG/1
TABLET ORAL
Qty: 90 TAB | Refills: 0 | Status: SHIPPED | OUTPATIENT
Start: 2018-02-26 | End: 2018-05-23

## 2018-04-10 DIAGNOSIS — K21.9 GASTROESOPHAGEAL REFLUX DISEASE WITHOUT ESOPHAGITIS: ICD-10-CM

## 2018-04-10 NOTE — TELEPHONE ENCOUNTER
Pharmacy on file verified. Last filled 1/30/18 for 30 tabs  Last office visit 2/12/18  Last labs 2/14/18  Upcoming appointment 4/23/18  Pharmacy requesting 90 day supply.    .  Requested Prescriptions     Pending Prescriptions Disp Refills    omeprazole (PRILOSEC) 40 mg capsule 30 Cap 2     Sig: TAKE ONE CAPSULE BY MOUTH EVERY DAY

## 2018-04-11 RX ORDER — OMEPRAZOLE 40 MG/1
CAPSULE, DELAYED RELEASE ORAL
Qty: 90 CAP | Refills: 0 | Status: SHIPPED | OUTPATIENT
Start: 2018-04-11 | End: 2018-06-06 | Stop reason: SDUPTHER

## 2018-04-24 ENCOUNTER — TELEPHONE (OUTPATIENT)
Dept: RHEUMATOLOGY | Age: 78
End: 2018-04-24

## 2018-04-24 ENCOUNTER — OFFICE VISIT (OUTPATIENT)
Dept: RHEUMATOLOGY | Age: 78
End: 2018-04-24

## 2018-04-24 VITALS
WEIGHT: 163 LBS | BODY MASS INDEX: 28.88 KG/M2 | TEMPERATURE: 98.4 F | DIASTOLIC BLOOD PRESSURE: 91 MMHG | RESPIRATION RATE: 18 BRPM | HEART RATE: 88 BPM | HEIGHT: 63 IN | SYSTOLIC BLOOD PRESSURE: 137 MMHG

## 2018-04-24 DIAGNOSIS — M25.511 ACUTE PAIN OF RIGHT SHOULDER: ICD-10-CM

## 2018-04-24 DIAGNOSIS — M54.12 CERVICAL RADICULOPATHY: Primary | ICD-10-CM

## 2018-04-24 RX ORDER — TRIAMCINOLONE ACETONIDE 40 MG/ML
40 INJECTION, SUSPENSION INTRA-ARTICULAR; INTRAMUSCULAR ONCE
Qty: 1 ML | Refills: 0
Start: 2018-04-24 | End: 2018-04-24

## 2018-04-24 RX ORDER — DEXAMETHASONE 0.5 MG/1
TABLET ORAL
Qty: 30 TAB | Refills: 0 | Status: SHIPPED | OUTPATIENT
Start: 2018-04-24 | End: 2018-05-23

## 2018-04-24 RX ORDER — LIDOCAINE HYDROCHLORIDE 10 MG/ML
1 INJECTION, SOLUTION EPIDURAL; INFILTRATION; INTRACAUDAL; PERINEURAL ONCE
Qty: 1 ML | Refills: 0
Start: 2018-04-24 | End: 2018-04-24

## 2018-04-24 NOTE — PATIENT INSTRUCTIONS
I prescribed Decadron (dexamethason) to take one tablet twice daily for 14 days and then one tablet daily    Please call the Patient Care Scheduling Team [phone] to schedule your test (MRI neck).

## 2018-04-24 NOTE — PROGRESS NOTES
REASON FOR VISIT    This is an acute follow-up visit for Ms. Kal Emerson for right shoulder pain. Inflammatory arthritis phenotype includes:  Anti-CCP positive: no  Rheumatoid factor positive: yes (low titer 19.3)  Erosive disease: no  Extra-articular manifestations include: pericardial effusion, pulmonary nodules and GGO    Immunosuppression Screening (11/14/2017): Quantiferon TB: negative  PPD:  Not performed  Hepatitis B: negative  Hepatitis C: negative    Therapy History includes:    Current DMARD therapy include: leflunomide 20 mg daily, Enbrel (1/2018 - present)  Prior DMARD therapy include: methotrexate 17.5 mg every Sunday (CKD)  Discontinued DMARDs because of inefficacy: None  Discontinued DMARDs because of side effects: None    Immunizations:   Immunization History   Administered Date(s) Administered    Influenza High Dose Vaccine PF 10/10/2016    Pneumococcal Polysaccharide (PPSV-23) 10/10/2016    Pneumococcal Vaccine (Pcv) 10/31/2007    Tdap 04/21/2014    Zoster Vaccine, Live 10/10/2016       Active problems include:    Patient Active Problem List   Diagnosis Code    DJD (degenerative joint disease) M19.90    Osteopenia of multiple sites M85.89    Pericardial effusion I31.3    Seropositive rheumatoid arthritis ([facility] [address] M05.9    Primary osteoarthritis of both hands M19.041, M19.042    Primary osteoarthritis of both knees M17.0    Trochanteric bursitis of both hips M70.61, M70.62    Pulmonary nodules R91.8    Abnormal CT scan, chest R93.8    Long-term use of immunosuppressant medication Z79.899    ILD (interstitial lung disease) ([facility]) J84.9       HISTORY OF PRESENT ILLNESS    Ms. Kal Emerson returns for an acute follow-up visit. On her last visit, I injected her right shoulder with good tolerance and mild improvement and I also referred her to physical which she also did with some relief. She truly states that it is not better. She reports the pain is form her neck, shoulder and arm.  There is a tingling sensation that goes down her neck to 3rd and 4th finger associated with aching. She has intermittent numbness at rest. The pain wakes her up at night and she has to slowly mobilize her arm to raise it due to shocking pain. Last blood work was done on 11/14/2017 and did not reveal any significant adverse effects, except creatinine 1.05 mg/dL and eGFR 60. Most recent inflammatory markers from 11/14/2017 revealed a ESR 2 mm/hr (previously 20, 9, 4, 3, 10 mm/hr) and CRP 2.0 mg/L (previously 0.9, 1.0, 0.8, 1.3, 0.7 mg/dL). The patient has not had any interval hospital admissions, infections, or surgeries. REVIEW OF SYSTEMS    A comprehensive review of systems was performed and pertinent results are documented in the HPI, review of systems is otherwise non-contributory. PAST MEDICAL HISTORY    She has a past medical history of Carpal tunnel syndrome; DJD (degenerative joint disease); DJD (degenerative joint disease) of knees (4/9/2013); Eczema; GERD (gastroesophageal reflux disease); ILD (interstitial lung disease) ([facility] [address] (6/14/2017); Osteopenia of multiple sites; Post-menopausal; Primary osteoarthritis of both hands (5/27/2016); Primary osteoarthritis of both knees (5/27/2016); Pulmonary nodules (6/15/2016); and Seropositive rheumatoid arthritis ([facility] (5/27/2016). FAMILY HISTORY    Her family history includes Arthritis-osteo in her brother, father, sister, and sister; Elevated Lipids in her sister; Heart Disease (age of onset: 72) in her mother; Hypertension in her brother, maternal aunt, and maternal uncle; Stroke in her maternal aunt and maternal uncle; Thyroid Disease in her sister. SOCIAL HISTORY    She reports that she quit smoking about 4 years ago. Her smoking use included Cigarettes. She smoked 0.10 packs per day. She has never used smokeless tobacco. She reports that she does not drink alcohol or use illicit drugs.     MEDICATIONS    Current Outpatient Prescriptions   Medication Sig Dispense Refill    dexamethasone (DECADRON) 0.5 mg tablet 1 tab twice daily for 14 weeks then 1 tab daily 30 Tab 0    omeprazole (PRILOSEC) 40 mg capsule TAKE ONE CAPSULE BY MOUTH EVERY DAY 90 Cap 0    leflunomide (ARAVA) 20 mg tablet TAKE 1/2 TABLET BY MOUTH DAILY FOR 7 DAYS AND THEN ONE TABLET DAILY IF TOLERATED 90 Tab 0    hydroCHLOROthiazide (HYDRODIURIL) 25 mg tablet Take 1 Tab by mouth daily. Takes 1 tab daily. 90 Tab 1    etanercept (ENBREL SURECLICK) 50 mg/mL (9.80 mL) injection 0.98 mL by SubCUTAneous route every seven (7) days. 3.98 mL 6    vitamin E (AQUA GEMS) 400 unit capsule Take 400 Units by mouth daily.  OTHER Take 1,200 mg by mouth daily. Indications: Calcium and Vitamin D3 1200 mg total      ascorbic acid, vitamin C, (VITAMIN C) 500 mg tablet Take 500 mg by mouth daily. Takes 1 tab daily. ALLERGIES    No Known Allergies    PHYSICAL EXAMINATION    Visit Vitals    BP (!) 137/91    Pulse 88    Temp 98.4 °F (36.9 °C)    Resp 18    Ht 5' 3\" (1.6 m)    Wt 163 lb (73.9 kg)    BMI 28.87 kg/m2     Body mass index is 28.87 kg/(m^2). General: Patient is alert, oriented x 3, not in acute distress    HEENT:   Sclerae are not injected and appear moist.  There is no alopecia. Neck is supple     Chest:  Breathing comfortably at room air. Skin exam:  There are no rashes, no alopecia, no discoid lesions, no active Raynaud's, no livedo reticularis, no periungual erythema. Musculoskeletal exam:  A comprehensive musculoskeletal exam was NOT performed for all joints of each upper and lower extremity and assessed for swelling, tenderness and range of motion.  Positive results are documented as below:    Decreased active and passive ROM of right shoulder    Previous visit     Decreased ROM of right shoulder due to pain  Bilateral Michael and Heberden nodes  Bilateral knee crepitus without effusion but active synovitis (left more than right)  Bilateral hallux valgus  [place] Scrape toes  Left ankle swelling and pain    Joint Count 2/14/2018 11/14/2017 9/14/2017 6/14/2017 3/14/2017 2/14/2017 11/16/2016   Patient pain (0-100) 0 5 (No Data) 0 0 0 0   MHAQ 0 0.5 0 0 0 0 0   Left shoulder - Tender 1 - - - - - -   Left elbow - Tender - 1 1 - - - -   Left elbow - Swollen - 1 - - - - -   Left wrist- Tender - - - 0 0 0 -   Left wrist- Swollen 1 1 1 1 1 1 1   Left 1st MCP - Tender - 1 1 - - - -   Left 1st MCP - Swollen 1 1 1 1 - - -   Left 2nd MCP - Tender - 1 - - - - -   Left 2nd MCP - Swollen 1 1 1 1 - - -   Left 3rd MCP - Tender - 1 - - - - -   Left 3rd MCP - Swollen 1 1 - 1 - - -   Left 4th MCP - Swollen 1 - - - - - -   Left 5th MCP - Tender - 1 - - - - -   Left 5th MCP - Swollen - 1 - 1 - - 1   Left thumb IP - Tender - 1 - - - - -   Left thumb IP - Swollen - - - 1 - - -   Left 2nd PIP - Tender 1 1 - - - - -   Left 2nd PIP - Swollen 1 1 - 1 - - -   Left 3rd PIP - Tender 1 1 - - - - -   Left 3rd PIP - Swollen 1 1 - 1 1 - -   Left knee - Tender - 1 1 - - - -   Left knee - Swollen - 1 1 - - - -   Right shoulder - Tender 1 1 - - - - -   Right elbow - Tender - 1 1 - - - -   Right elbow - Swollen - 1 - - - - -   Right wrist- Tender - 1 - - - - -   Right wrist- Swollen 1 1 1 1 - 1 1   Right 1st MCP - Tender 1 1 1 - - - -   Right 1st MCP - Swollen 1 1 1 1 - 1 -   Right 2nd MCP - Tender - 1 - - - - -   Right 2nd MCP - Swollen - 1 - 1 - - 1   Right 3rd MCP - Tender - 1 - - - - -   Right 3rd MCP - Swollen 1 - - 1 - - -   Right 4th MCP - Swollen 1 - - - - - -   Right 5th MCP - Tender - 1 - - - - -   Right 5th MCP - Swollen - 1 - 1 - 1 1   Right thumb IP - Tender - 1 - - - - -   Right 2nd PIP - Tender 1 1 1 - - - -   Right 2nd PIP - Swollen 1 1 1 1 - 1 -   Right 3rd PIP - Tender - 1 - - - - -   Right 3rd PIP - Swollen 1 1 1 1 1 - -   Right 4th PIP - Tender - - - - - - -   Right 4th PIP - Swollen - 1 - - - - -   Right 5th PIP - Tender - 1 - - - - -   Right knee - Tender - 1 - - - - -   Right knee - Swollen - - 1 - - - -   Tender Joint Count (Total) 6 21 6 0 0 0 -   Swollen Joint Count (Total) 13 17 9 15 3 5 -   Physician Assessment (0-10) 4 4 3 2 1 2 2   Patient Assessment (0-10) 0 0.5 (No Data) 0 0.5 0.5 1   CDAI Total (calculated) 23 42.5 - 17 4.5 7.5 -     DATA REVIEW    Laboratory     Recent laboratory results were reviewed, summarized, and discussed with the patient. Imaging    Musculoskeletal Ultrasound    Ultrasound of the right wrist. Indication: joint swelling. (5/27/2016)  Using a OpenHatch e with 18 Mhz probe, limited views of the right wrist were obtained. This revealed hypoechoic collection without doppler within the midcarpal joint space. The tendons were normal. Bony contours were regular without erosions seen. There were no soft tissue masses noted. Impression: synovial hypertrophy    Radiographs    Bilateral Hand 5/27/2016: Moderate bilateral triscaphe joint osteoarthritis. Moderate left and mild right first MC joint osteoarthritis. Moderate bilateral first MCP joint osteoarthritis. Multifocal IP joint osteoarthritis is bilateral. There are central erosions in the right first through fourth DIP joints and left third and fifth MP joints. Subtle marginal erosions in the left second DIP joint. No fracture or dislocation on plain film. Alignment is within normal limits. Bone mineralization is decreased. No soft tissue calcification. Bilateral Shoulder 5/27/2016: LEFT: demonstrate mild osteopenia. No acute fracture or dislocation. Age-appropriate AC joint osteoarthritis with marginal osteophytes. Glenohumeral joint is within normal limits for age. No evidence of erosion. RIGHT: demonstrate osteopenia. No acute fracture or dislocation. AC joint osteoarthritis includes marginal osteophytes. Glenohumeral joint osteoarthritis is age-appropriate. Cortical irregularity of the greater tuberosity of the humerus indicates underlying rotator cuff pathology    Left Knee 12/29/2015: no fracture.  Joint effusion and prepatellar soft tissue contusion versus bursitis. Increased osteoarthritis. Lumbar Spine 12/29/2015: stable dextroconvex scoliosis without evidence of acute fracture or subluxation. Significant multilevel degenerative disc disease is again noted from L2-S1. The patient is status post laminectomy from L1 through to the sacrum. Multilevel facet degenerative arthritis again noted. Left Knee 8/03/2010: sclerosis and deformity of the lateral tibial plateau with associated lateral compartment osteoarthritis is suggestive of old injury. No acute fracture is seen. There is moderate to severe patellofemoral     CT Imaging    CT Chest without contrast 6/20/2017: The style windows demonstrate no adenopathy. There continues to be a moderate pericardial effusion which is unchanged when compared with 7/22/2016. No thyroid nodules. No axillary adenopathy. No adrenal lesions. There is a small moderate hiatal hernia. Review of bone windows demonstrates no destructive lesions. Lung windows reveal a stable small solid nodule in the right middle lobe laterally. The ill-defined semisolid groundglass opacity anteriorly in left lower lobe is unchanged when compared with the CT of 3/12/2015. This should be evaluated on continue follow-up. Follow-up exam in one year is suggested. No new nodules are noted. The high-resolution images demonstrate mild changes of centrilobular emphysema. There is no evidence for fibrosis or bronchiectasis. No significant interstitial lung disease. CT Chest without contrast 7/22/2016: Small lingular and right middle lobe nodules and left lower lobe groundglass opacity all unchanged. This represents a 16 month follow-up examination. The small middle lobe nodule and groundglass opacity in the left lower lobe are essentially unchanged in comparison with 3/9/2011.     CT Chest with contrast 1/27/2016: nodules in both lungs including a soft more groundglass appearance nodule in the left lower lobe are stable when compared to the previous examination. No new acute finding or new nodule. Small pericardial effusion. Hiatal hernia. CT Chest without contrast 6/15/2015: stable semisolid nodule anteriorly in left lower lobe. The other nodules are stable. Decreasing pericardial effusion. Stable adenopathy. She underwent a fine needle biopsy which showed abundant benign and reactive bronchial epithelial cells and no malignancy. CT Chest without contrast 5/13/2015: a suspicious semisolid nodule in the anterior segment of the left lower lobe just posterior to the fissure with some retraction of the fissure. This measures 1.2 x 1 x 1.4 cm. PET/CT negative for an 4/10/2015. Moderate stable pericardial effusion. Stable enlarged lymph nodes as described. Several other small pulmonary nodules also stable. PET/CT Scan 4/10/2015: showed the vague semisolid nodule in the left lower lobe demonstrates no increased metabolic activity on PET suggesting benign etiology but is nonspecific and neoplasm is not entirely excluded. There are 2 lymph nodes in the right neck which are normal in size and demonstrate slight increased metabolic activity nonspecific most likely reactive. CT Heart without contrast with calcium 3/12/2015: total calcium score of 0. A low score suggests a low likelihood of coronary disease but does not exclude the possibility of significant coronary artery narrowing. 3 mm right middle lobe lung nodule unchanged. Pericardial effusion. Hiatal hernia. A CT chest without contrast showed a suspicious semisolid nodule in the anterior segment of the left lower lobe just posterior to the fissure with some retraction of the fissure. This measures 1.2 x 1 x 1.4 cm. This could represent a small bronchogenic carcinoma. Moderate pericardial effusion clinical correlation is recommended. 2 enlarged lymph nodes as described.  Several other small pulmonary nodules can be evaluated on followup 23X    CT Heart without contrast with calcium 3/09/2011: total calcium score of 0. A low score suggests a low likelihood of coronary disease but does not exclude the possibility of significant coronary artery narrowing. Small pericardial effusion. 3 mm pulmonary nodule right middle lobe. 7 mm density left lower lobe. MR Imaging    None    DXA    DXA  10/06/2017: (excluded None) showed lumbar spine L1-L4 T score 1.2 (BMD 1.324 g/cm2), left femoral neck T score: -2.3 (0.721 g/cm2), left total hip T score: -2.1 (0.748 g/cm2), right femoral neck T score: -2.1 (0.749g/cm2), right total hip T score: -1.4 (0.834 g/cm2), and distal one third left radius T score N/A (BMD N/A g/cm2). FRAX score N/A % probability in 10 years for major osteoporotic fracture and N/A % 10 year probability of hip fracture. DXA 5/21/2015: lumbar spine L1-L4 T score 1.7 (BMD 1.389 g/cm2), left femoral neck T score: -2.2 (0.728 g/cm2), right femoral neck  T score: -1.4 (0.825 g/cm2), total hip T score: -2.2 (0.728 g/cm2). Echocardiogram    Echocardiogram 6/03/2015: LEFT VENTRICLE: Size was normal. Systolic function was normal. Ejection fraction was estimated in the range of 60 % to 65 %. There were no regional wall motion abnormalities. Wall thickness was mildly increased. DOPPLER: Doppler parameters were consistent with abnormal left ventricular relaxation (grade 1 diastolic dysfunction). RIGHT VENTRICLE: The size was normal. Systolic function was normal. LEFT ATRIUM: Size was normal. LA volume index was 23 ml/m squared. ATRIAL SEPTUM: There was a small atrial septal aneurysm. There was no evidence of shunt on color flow imaging. RIGHT ATRIUM: Size was normal. MITRAL VALVE: Normal valve structure. DOPPLER: There was trivial regurgitation. AORTIC VALVE: Normal valve structure. DOPPLER: Transaortic velocity was within the normal range. There was no stenosis. There was no significant regurgitation. TRICUSPID VALVE: Normal valve structure.  DOPPLER: There was mild regurgitation. Right atrial pressure estimate: 5 mmHg. Pulmonary artery systolic pressure: 25 mmHg. There was no pulmonary hypertension. PULMONIC VALVE: Not well visualized. DOPPLER: The transpulmonic velocity was within the normal range. There was trivial regurgitation. AORTA: The root exhibited normal size. SYSTEMIC VEINS: IVC: The inferior vena cava was normal in size. PERICARDIUM: A small pericardial effusion was identified along the right atrial free wall. There was no evidence of hemodynamic compromise. PFT    PFT 6/20/2017: FVC of 1.78 (>80%), FEV1 of 1.31 (85%), FEV1/FVC of 73.82% and a DLCO of 19.65 (116%). PATHOLOGY    Fine Need Aspirate of LLL 6/15/2015: Abundant benign and reactive bronchial epithelial cells. No cells diagnostic for malignancy    PROCEDURE     Right Shoulder Kenalog 40 mg IA. (2/14/18)  Right Shoulder Kenalog 40 mg IA. (11/14/17)   Bilateral Trochanteric Bursitis Kenalog 40 mg IB. (2/14/17)   Bilateral Knee Kenalog 40 mg IA. (11/16/16)    ASSESSMENT AND PLAN    This is an acute follow-up visit for Ms. Aaron Kennedy. 1) Right Shoulder Pain. This appears referred pain from cervical radiculopathy. I ordered a cervical spine radiograph and an MRI of her cervical spine. She did not improve with physical therapy or an intra-articular steroid injection. I started her on Decadron 2 mg twice daily for 14 days and then daily for 14 days to give her some relief. She will likely need to see orthopedics. The patient voiced understanding of the aforementioned assessment and plan. Summary of plan was provided in the After Visit Summary patient instructions.      TODAY'S ORDERS    Orders Placed This Encounter    XR SPINE CERV 4 OR 5 V    MRI CERV SPINE WO CONT    dexamethasone (DECADRON) 0.5 mg tablet     Future Appointments  Date Time Provider Franny Willis   5/23/2018 10:00 AM Evelio Alves MD [address]   6/20/2018 10:00 AM [facility] CT ER [address] [place] [address]       Destin Garcia Curtis Sellers MD, [address]    Adult Rheumatology   Musculoskeletal Ultrasound Certified  [address] [facility] [address], [place], [address]   Phone [phone]  Fax [phone]

## 2018-04-24 NOTE — MR AVS SNAPSHOT
[address] [facility] [address] 
[phone] Patient: Jose Ramon Stewart MRN: [id number] OJB:[birth year] Visit Information Date & Time Provider Department Dept. Phone Encounter #  
 4/24/2018  2:40 PM [facility]olo Avelar, [facility] [id number] Your Appointments 5/23/2018 10:00 AM  
ESTABLISHED PATIENT with Cholo Avelar MD  
[address] ([facility]) Appt Note: 3 month f/up  
 [facility] [address]  
[phone]  
  
   
 [address] [address] Upcoming Health Maintenance Date Due COLONOSCOPY 2/7/2018 GLAUCOMA SCREENING Q2Y 5/9/2018 MEDICARE YEARLY EXAM 4/25/2018 DTaP/Tdap/Td series (2 - Td) 4/21/2024 Allergies as of 4/24/2018  Review Complete On: 4/24/2018 By: Rohini Bruno RN No Known Allergies Current Immunizations  Reviewed on 11/16/2016 Name Date Influenza High Dose Vaccine PF 10/10/2016 Pneumococcal Polysaccharide (PPSV-23) 10/10/2016 Pneumococcal Vaccine (Pcv) 10/31/2007 Tdap 4/21/2014 Zoster Vaccine, Live 10/10/2016 Not reviewed this visit You Were Diagnosed With   
  
 Codes Comments Cervical radiculopathy    -  Primary ICD-10-CM: M54.12 
ICD-9-CM: 723.4 Acute pain of right shoulder     ICD-10-CM: M25.511 ICD-9-CM: 719.41 Vitals BP Pulse Temp Resp Height(growth percentile) Weight(growth percentile) (!) 137/91 88 98.4 °F (36.9 °C) 18 5' 3\" (1.6 m) 163 lb (73.9 kg) BMI OB Status Smoking Status 28.87 kg/m2 Hysterectomy Former Smoker BMI and BSA Data Body Mass Index Body Surface Area  
 28.87 kg/m 2 1.81 m 2 Preferred Pharmacy Pharmacy Name Phone [id number] [id number] [email or web address] [phone] Your Updated Medication List  
  
   
This list is accurate as of 18  3:01 PM.  Always use your most recent med list.  
  
  
  
  
 dexamethasone 0.5 mg tablet Commonly known as:  DECADRON  
1 tab twice daily for 14 weeks then 1 tab daily  
  
 etanercept 50 mg/mL (0.98 mL) injection Commonly known as:  ENBREL SURECLICK  
8.42 mL by SubCUTAneous route every seven (7) days. hydroCHLOROthiazide 25 mg tablet Commonly known as:  HYDRODIURIL Take 1 Tab by mouth daily. Takes 1 tab daily. leflunomide 20 mg tablet Commonly known as:  [address] TAKE 1/2 TABLET BY MOUTH DAILY FOR 7 DAYS AND THEN ONE TABLET DAILY IF TOLERATED  
  
 lidocaine (PF) 10 mg/mL (1 %) injection Commonly known as:  XYLOCAINE  
1 mL by Intra artICUlar route once for 1 dose. omeprazole 40 mg capsule Commonly known as:  PRILOSEC  
TAKE ONE CAPSULE BY MOUTH EVERY DAY  
  
 OTHER Take 1,200 mg by mouth daily. Indications: Calcium and Vitamin D3 1200 mg total  
  
 triamcinolone acetonide 40 mg/mL injection Commonly known as:  KENALOG  
1 mL by Intra artICUlar route once for 1 dose. VITAMIN C 500 mg tablet Generic drug:  ascorbic acid (vitamin C) Take 500 mg by mouth daily. Takes 1 tab daily. vitamin E 400 unit capsule Commonly known as:  [address] Take 400 Units by mouth daily. Prescriptions Sent to Pharmacy Refills  
 dexamethasone (DECADRON) 0.5 mg tablet 0 Si tab twice daily for 14 weeks then 1 tab daily Class: Normal  
 Pharmacy: [facility] #[id number] Ph #: [phone] We Performed the Following [place] DRAIN/INJECT LARGE JOINT/BURSA [id number] CPT(R)] TRIAMCINOLONE ACETONIDE INJ [ [facility]] To-Do List   
 2018 Imaging:  MRI CERV SPINE WO CONT   
  
 2018   Imaging:  XR SPINE CERV 4 OR 5 V   
  
 2018 10:00 AM  
  Appointment with [facility] CT ER 2 at [facility] [address] ([phone]) NON-CONTRAST STUDY: 1. [facility] films/images pertaining to the area of interest with you on the day of appointment. 2. Check in at registration at least 30 minutes before appt time unless you were instructed to do otherwise. 3. If you have to drink oral contrast please pick it up any weekday prior to your appointment, if you cannot please check in 2 hrs  before appt time. Patient Instructions I prescribed Decadron (dexamethason) to take one tablet twice daily for 14 days and then one tablet daily Please call the Patient Care Scheduling Team [phone] to schedule your test (MRI neck). Introducing [facility] & HEALTH SERVICES! Negra Carnes introduces DGIT patient portal. Now you can access parts of your medical record, email your doctor's office, and request medication refills online. 1. In your internet browser, go to [email or web address] 2. Click on the First Time User? Click Here link in the Sign In box. You will see the New Member Sign Up page. 3. Enter your DGIT Access Code exactly as it appears below. You will not need to use this code after youve completed the sign-up process. If you do not sign up before the expiration date, you must request a new code. · DGIT Access Code: [email or web address] Expires: 7/23/2018  3:00 PM 
 
4. Enter the last four digits of your Social Security Number (xxxx) and Date of Birth (mm/dd/yyyy) as indicated and click Submit. You will be taken to the next sign-up page. 5. Create a Earmarkt ID. This will be your DGIT login ID and cannot be changed, so think of one that is secure and easy to remember. 6. Create a DGIT password. You can change your password at any time. 7. Enter your Password Reset Question and Answer. This can be used at a later time if you forget your password. 8. Enter your e-mail address. You will receive e-mail notification when new information is available in [address]  
9. Click Sign Up. You can now view and download portions of your medical record. 10. Click the Download Summary menu link to download a portable copy of your medical information. If you have questions, please visit the Frequently Asked Questions section of the RxResults website. Remember, RxResults is NOT to be used for urgent needs. For medical emergencies, dial 911. Now available from your iPhone and Android! Please provide this summary of care documentation to your next provider. Your primary care clinician is listed as Inder FLORES. If you have any questions after today's visit, please call [phone].

## 2018-04-25 NOTE — TELEPHONE ENCOUNTER
Spoke with pharmacy and clarified the prescription for Decadron. I told them that the prescription is meant to be written for 1 tab daily for 14 days (not weeks) and then 1 tab daily. She stated an understanding and will fix it in the system.

## 2018-05-07 ENCOUNTER — HOSPITAL ENCOUNTER (OUTPATIENT)
Dept: MRI IMAGING | Age: 78
Discharge: HOME | End: 2018-05-07
Attending: INTERNAL MEDICINE
Payer: MEDICARE

## 2018-05-07 DIAGNOSIS — M25.511 ACUTE PAIN OF RIGHT SHOULDER: ICD-10-CM

## 2018-05-07 DIAGNOSIS — M54.12 CERVICAL RADICULOPATHY: ICD-10-CM

## 2018-05-07 PROCEDURE — 72141 MRI NECK SPINE W/O DYE: CPT

## 2018-05-23 ENCOUNTER — OFFICE VISIT (OUTPATIENT)
Dept: RHEUMATOLOGY | Age: 78
End: 2018-05-23

## 2018-05-23 VITALS
HEART RATE: 84 BPM | DIASTOLIC BLOOD PRESSURE: 80 MMHG | WEIGHT: 163 LBS | TEMPERATURE: 98.2 F | HEIGHT: 63 IN | RESPIRATION RATE: 18 BRPM | BODY MASS INDEX: 28.88 KG/M2 | SYSTOLIC BLOOD PRESSURE: 171 MMHG

## 2018-05-23 DIAGNOSIS — M54.12 CERVICAL RADICULOPATHY: ICD-10-CM

## 2018-05-23 DIAGNOSIS — Z79.60 LONG-TERM USE OF IMMUNOSUPPRESSANT MEDICATION: ICD-10-CM

## 2018-05-23 DIAGNOSIS — M19.042 PRIMARY OSTEOARTHRITIS OF BOTH HANDS: ICD-10-CM

## 2018-05-23 DIAGNOSIS — M19.041 PRIMARY OSTEOARTHRITIS OF BOTH HANDS: ICD-10-CM

## 2018-05-23 DIAGNOSIS — M17.0 PRIMARY OSTEOARTHRITIS OF BOTH KNEES: ICD-10-CM

## 2018-05-23 DIAGNOSIS — M05.79 SEROPOSITIVE RHEUMATOID ARTHRITIS OF MULTIPLE SITES (HCC): Primary | ICD-10-CM

## 2018-05-23 DIAGNOSIS — M85.89 OSTEOPENIA OF MULTIPLE SITES: ICD-10-CM

## 2018-05-23 RX ORDER — LANOLIN ALCOHOL/MO/W.PET/CERES
1 CREAM (GRAM) TOPICAL DAILY
COMMUNITY

## 2018-05-23 RX ORDER — BISMUTH SUBSALICYLATE 262 MG
1 TABLET,CHEWABLE ORAL DAILY
COMMUNITY

## 2018-05-23 NOTE — MR AVS SNAPSHOT
[address] [place] [address] 
[phone] Patient: Cookie Candelario MRN: [id number] TCX:8/76/1962 Visit Information Date & Time Provider Department Dept. Phone Encounter #  
 5/23/2018 10:00 AM Lacretia Aschoff, [address] [facility] [id number] Follow-up Instructions Return in about 3 months (around 8/23/2018). Upcoming Health Maintenance Date Due COLONOSCOPY 2/7/2018 MEDICARE YEARLY EXAM 4/25/2018 GLAUCOMA SCREENING Q2Y 5/9/2018 Influenza Age 5 to Adult 8/1/2018 DTaP/Tdap/Td series (2 - Td) 4/21/2024 Allergies as of 5/23/2018  Review Complete On: 5/23/2018 By: Lacretia Aschoff, MD  
 No Known Allergies Current Immunizations  Reviewed on 11/16/2016 Name Date Influenza High Dose Vaccine PF 10/10/2016 Pneumococcal Polysaccharide (PPSV-23) 10/10/2016 Pneumococcal Vaccine (Pcv) 10/31/2007 Tdap 4/21/2014 Zoster Vaccine, Live 10/10/2016 Not reviewed this visit You Were Diagnosed With   
  
 Codes Comments Seropositive rheumatoid arthritis of multiple sites [facility])    -  Primary ICD-10-CM: M05.79 ICD-9-CM: 714.0 Long-term use of immunosuppressant medication     ICD-10-CM: Z79.899 ICD-9-CM: V58.69 Cervical radiculopathy     ICD-10-CM: M54.12 
ICD-9-CM: 723.4 Vitals BP Pulse Temp Resp Height(growth percentile) Weight(growth percentile) 171/80 84 98.2 °F (36.8 °C) 18 5' 3\" (1.6 m) 163 lb (73.9 kg) BMI OB Status Smoking Status 28.87 kg/m2 Hysterectomy Former Smoker BMI and BSA Data Body Mass Index Body Surface Area  
 28.87 kg/m 2 1.81 m 2 Preferred Pharmacy Pharmacy Name Phone [id number] [place] [address] [facility] [phone] Your Updated Medication List  
  
   
This list is accurate as of 5/23/18 10:28 AM.  Always use your most recent med list.  
  
  
  
  
 etanercept 50 mg/mL (0.98 mL) injection Commonly known as:  ENBREL SURECLICK  
7.70 mL by SubCUTAneous route every seven (7) days. glucosamine-chondroitin 500-400 mg Cap Commonly known as:  [facility] [place] Take 1 Cap by mouth daily. hydroCHLOROthiazide 25 mg tablet Commonly known as:  HYDRODIURIL Take 1 Tab by mouth daily. Takes 1 tab daily. leflunomide 20 mg tablet Commonly known as:  Rosita Layla TAKE 1/2 TABLET BY MOUTH DAILY FOR 7 DAYS AND THEN ONE TABLET DAILY IF TOLERATED  
  
 multivitamin tablet Commonly known as:  ONE A DAY Take 1 Tab by mouth daily. omeprazole 40 mg capsule Commonly known as:  PRILOSEC  
TAKE ONE CAPSULE BY MOUTH EVERY DAY  
  
 OTHER Take 1,200 mg by mouth daily. Indications: Calcium and Vitamin D3 1200 mg total  
  
 VITAMIN C 500 mg tablet Generic drug:  ascorbic acid (vitamin C) Take 500 mg by mouth daily. Takes 1 tab daily. vitamin E 400 unit capsule Commonly known as:  [address] Take 400 Units by mouth daily. We Performed the Following REFERRAL TO ORTHOPEDICS [HHO206 Custom] Follow-up Instructions Return in about 3 months (around 8/23/2018). To-Do List   
 06/20/2018 10:00 AM  
  Appointment with [facility] CT ER 2 at [facility] RAD [address] ([phone]) NON-CONTRAST STUDY: 1. Bring any non [facility] facility films/images pertaining to the area of interest with you on the day of appointment. 2. Check in at registration at least 30 minutes before appt time unless you were instructed to do otherwise. 3. If you have to drink oral contrast please pick it up any weekday prior to your appointment, if you cannot please check in 2 hrs  before appt time. Referral Information Referral ID Referred By Referred To  
  
 [id number] Maria D Braswell [facility] [address] Phone: [phone]    Fax: [phone] Visits Status Start Date End Date 1 New Request 5/23/18 5/23/19 If your referral has a status of pending review or denied, additional information will be sent to support the outcome of this decision. Introducing [facility] HEALTH SERVICES! [facility] introduces SMX patient portal. Now you can access parts of your medical record, email your doctor's office, and request medication refills online. 1. In your internet browser, go to [email or web address] 2. Click on the First Time User? Click Here link in the Sign In box. You will see the New Member Sign Up page. 3. Enter your SMX Access Code exactly as it appears below. You will not need to use this code after youve completed the sign-up process. If you do not sign up before the expiration date, you must request a new code. · SMX Access Code: [email or web address] Expires: 7/23/2018  3:00 PM 
 
4. Enter the last four digits of your Social Security Number (xxxx) and Date of Birth (mm/dd/yyyy) as indicated and click Submit. You will be taken to the next sign-up page. 5. Create a SMX ID. This will be your SMX login ID and cannot be changed, so think of one that is secure and easy to remember. 6. Create a SMX password. You can change your password at any time. 7. Enter your Password Reset Question and Answer. This can be used at a later time if you forget your password. 8. Enter your e-mail address. You will receive e-mail notification when new information is available in [address] 9. Click Sign Up. You can now view and download portions of your medical record. 10. Click the Download Summary menu link to download a portable copy of your medical information. If you have questions, please visit the Frequently Asked Questions section of the SMX website. Remember, SMX is NOT to be used for urgent needs. For medical emergencies, dial 911. Now available from your iPhone and Android!  
 
  
  
 Please provide this summary of care documentation to your next provider. Your primary care clinician is listed as Elliot FLORES. If you have any questions after today's visit, please call [phone].

## 2018-05-31 ENCOUNTER — TELEPHONE (OUTPATIENT)
Dept: RHEUMATOLOGY | Age: 78
End: 2018-05-31

## 2018-05-31 NOTE — TELEPHONE ENCOUNTER
----- Message from Nataliia Orta sent at 5/31/2018 12:44 PM EDT -----  Regarding: /Telephone  Pt is requesting a call back in reference to some medication for the pain in her legs, pt stated she cant sleep at night. Pt use  [facility] [phone]. Best contact number is [phone].

## 2018-06-01 NOTE — TELEPHONE ENCOUNTER
Spoke with pt who called stating that her legs are bothering her from the knees down. She stated that her left knee is swollen and her feet/ankles are swollen. She stated that when she lays down in the bed at night her legs ache and they keep her awake. She stated that they feel a little better once she is up and walking but every night her legs are bothering her. She stated that in the past Dr. Mara Hull gave her some prednisone and it helped make this go away. She wants to know if she can get more prednisone or if this is from something else? Please advise.

## 2018-06-06 DIAGNOSIS — K21.9 GASTROESOPHAGEAL REFLUX DISEASE WITHOUT ESOPHAGITIS: ICD-10-CM

## 2018-06-06 RX ORDER — OMEPRAZOLE 40 MG/1
CAPSULE, DELAYED RELEASE ORAL
Qty: 90 CAP | Refills: 3 | Status: SHIPPED | OUTPATIENT
Start: 2018-06-06 | End: 2018-08-23

## 2018-06-06 RX ORDER — PREDNISONE 5 MG/1
TABLET ORAL
Qty: 70 TAB | Refills: 0 | Status: SHIPPED | OUTPATIENT
Start: 2018-06-06 | End: 2018-08-23

## 2018-06-06 NOTE — TELEPHONE ENCOUNTER
Pharmacy on file verified  Last filled 4/11/18  Last office visit 2/12/18  Last labs  2/14/18  Pharmacy requesting 90 day supply.   .  Requested Prescriptions     Pending Prescriptions Disp Refills    omeprazole (PRILOSEC) 40 mg capsule 90 Cap 0     Sig: TAKE ONE CAPSULE BY MOUTH EVERY DAY

## 2018-06-06 NOTE — TELEPHONE ENCOUNTER
Spoke with pt and she stated that she would rather have oral prednisone sent to the pharmacy for her to take. I told her we would send a script today. She stated an understanding.

## 2018-08-23 ENCOUNTER — OFFICE VISIT (OUTPATIENT)
Dept: RHEUMATOLOGY | Age: 78
End: 2018-08-23

## 2018-08-23 VITALS
HEIGHT: 63 IN | TEMPERATURE: 97.6 F | DIASTOLIC BLOOD PRESSURE: 78 MMHG | OXYGEN SATURATION: 94 % | SYSTOLIC BLOOD PRESSURE: 134 MMHG | WEIGHT: 167.6 LBS | HEART RATE: 66 BPM | BODY MASS INDEX: 29.7 KG/M2 | RESPIRATION RATE: 16 BRPM

## 2018-08-23 DIAGNOSIS — M17.12 PRIMARY OSTEOARTHRITIS OF LEFT KNEE: ICD-10-CM

## 2018-08-23 DIAGNOSIS — M85.89 OSTEOPENIA OF MULTIPLE SITES: ICD-10-CM

## 2018-08-23 DIAGNOSIS — Z79.60 LONG-TERM USE OF IMMUNOSUPPRESSANT MEDICATION: ICD-10-CM

## 2018-08-23 DIAGNOSIS — M54.12 CERVICAL RADICULOPATHY: ICD-10-CM

## 2018-08-23 DIAGNOSIS — M05.79 SEROPOSITIVE RHEUMATOID ARTHRITIS OF MULTIPLE SITES (HCC): Primary | ICD-10-CM

## 2018-08-23 RX ORDER — TRIAMCINOLONE ACETONIDE 40 MG/ML
40 INJECTION, SUSPENSION INTRA-ARTICULAR; INTRAMUSCULAR ONCE
Qty: 1 ML | Refills: 0
Start: 2018-08-23 | End: 2018-08-23

## 2018-08-23 RX ORDER — LIDOCAINE HYDROCHLORIDE 10 MG/ML
1 INJECTION, SOLUTION EPIDURAL; INFILTRATION; INTRACAUDAL; PERINEURAL ONCE
Qty: 1 ML | Refills: 0
Start: 2018-08-23 | End: 2018-08-23

## 2018-08-23 NOTE — MR AVS SNAPSHOT
[address] [facility] [address] 
[phone] Patient: Martha Yeh MRN: [id number] [facility]:0[birth year]76[birth year]25 Visit Information Date & Time Provider Department Dept. Phone Encounter #  
 8/23/2018 10:00 AM Zoraida Bardales, [facility] [phone] [id number] Follow-up Instructions Return in about 3 months (around 11/23/2018). Upcoming Health Maintenance Date Due COLONOSCOPY 2/7/2018 MEDICARE YEARLY EXAM 4/25/2018 GLAUCOMA SCREENING Q2Y 5/9/2018 Influenza Age 5 to Adult 8/1/2018 DTaP/Tdap/Td series (2 - Td) 4/21/2024 Allergies as of 8/23/2018  Review Complete On: 8/23/2018 By: Nida Doyle LPN No Known Allergies Current Immunizations  Reviewed on 11/16/2016 Name Date Influenza High Dose Vaccine PF 10/10/2016 Pneumococcal Polysaccharide (PPSV-23) 10/10/2016 Pneumococcal Vaccine (Pcv) 10/31/2007 Tdap 4/21/2014 Zoster Vaccine, Live 10/10/2016 Not reviewed this visit You Were Diagnosed With   
  
 Codes Comments Seropositive rheumatoid arthritis of multiple sites [facility])    -  Primary ICD-10-CM: M05.79 ICD-9-CM: 714.0 Long-term use of immunosuppressant medication     ICD-10-CM: Z79.899 ICD-9-CM: V58.69 Primary osteoarthritis of left knee     ICD-10-CM: M17.12 
ICD-9-CM: 715.16 Vitals BP Pulse Temp Resp Height(growth percentile) Weight(growth percentile) 134/78 (BP 1 Location: Left arm, BP Patient Position: Sitting) 66 97.6 °F (36.4 °C) (Oral) 16 5' 3\" (1.6 m) 167 lb 9.6 oz (76 kg) SpO2 BMI OB Status Smoking Status 94% 29.69 kg/m2 Hysterectomy Former Smoker Vitals History BMI and BSA Data Body Mass Index Body Surface Area  
 29.69 kg/m 2 1.84 m 2 Preferred Pharmacy Pharmacy Name Phone [id number] [place] [address] [phone] Your Updated Medication List  
  
   
This list is accurate as of 8/23/18 10:27 AM.  Always use your most recent med list.  
  
  
  
  
 etanercept 50 mg/mL (0.98 mL) injection Commonly known as:  ENBREL SURECLICK  
5.81 mL by SubCUTAneous route every seven (7) days. glucosamine-chondroitin 500-400 mg Cap Commonly known as:  20 [facility] Take 1 Cap by mouth daily. hydroCHLOROthiazide 25 mg tablet Commonly known as:  HYDRODIURIL Take 1 Tab by mouth daily. Takes 1 tab daily. lidocaine (PF) 10 mg/mL (1 %) injection Commonly known as:  XYLOCAINE  
1 mL by Intra artICUlar route once for 1 dose. multivitamin tablet Commonly known as:  ONE A DAY Take 1 Tab by mouth daily. OTHER Take 1,200 mg by mouth daily. Indications: Calcium and Vitamin D3 1200 mg total  
  
 triamcinolone acetonide 40 mg/mL injection Commonly known as:  KENALOG  
1 mL by Intra artICUlar route once for 1 dose. VITAMIN C 500 mg tablet Generic drug:  ascorbic acid (vitamin C) Take 500 mg by mouth daily. Takes 1 tab daily. vitamin E 400 unit capsule Commonly known as:  [address] Take 400 Units by mouth daily. We Performed the Following [place] DRAIN/INJECT LARGE JOINT/BURSA [id number] CPT(R)] TRIAMCINOLONE ACETONIDE INJ [ [facility]] Follow-up Instructions Return in about 3 months (around 11/23/2018). To-Do List   
 09/05/2018 10:00 AM  
  Appointment with [facility] CT ER 1 at [facility] RAD [address] ([phone]) NON-CONTRAST STUDY: 1. [facility] films/images pertaining to the area of interest with you on the day of appointment. 2. Check in at registration at least 30 minutes before appt time unless you were instructed to do otherwise. 3. If you have to drink oral contrast please pick it up any weekday prior to your appointment, if you cannot please check in 2 hrs  before appt time. Patient Instructions Please continue to rest the joint for a few more days before resuming regular activities. It may be more painful for the first 1-2 days. Watch for fever, or increased swelling or persistent pain. Call or return to clinic as needed if such symptoms occur or there is failure to improve as anticipated.  
  
 
 
 
  
Introducing [facility] SERVICES! [employer] introduces [employer] patient portal. Now you can access parts of your medical record, email your doctor's office, and request medication refills online. 1. In your internet browser, go to [email or web address] 2. Click on the First Time User? Click Here link in the Sign In box. You will see the New Member Sign Up page. 3. Enter your Valensum Access Code exactly as it appears below. You will not need to use this code after youve completed the sign-up process. If you do not sign up before the expiration date, you must request a new code. · Valensum Access Code: [email or web address] Expires: 11/21/2018  9:10 AM 
 
4. Enter the last four digits of your Social Security Number (xxxx) and Date of Birth (mm/dd/yyyy) as indicated and click Submit. You will be taken to the next sign-up page. 5. Create a Valensum ID. This will be your Valensum login ID and cannot be changed, so think of one that is secure and easy to remember. 6. Create a [employer]um password. You can change your password at any time. 7. Enter your Password Reset Question and Answer. This can be used at a later time if you forget your password. 8. Enter your e-mail address. You will receive e-mail notification when new information is available in [address] 9. Click Sign Up. You can now view and download portions of your medical record. 10. Click the Download Summary menu link to download a portable copy of your medical information. If you have questions, please visit the Frequently Asked Questions section of the Valensum website. Remember, Valensum is NOT to be used for urgent needs. For medical emergencies, dial 911.  
 
 
Now available from your iPhone and Android! Please provide this summary of care documentation to your next provider. Your primary care clinician is listed as Yobani FLORES. If you have any questions after today's visit, please call [phone].

## 2018-08-23 NOTE — PROGRESS NOTES
REASON FOR VISIT    This is a follow-up visit for Ms. Hidalgo Heading for Seropositive Rheumatoid Arthritis and Osteopenia. Inflammatory arthritis phenotype includes:  Anti-CCP positive: no  Rheumatoid factor positive: yes (low titer 19.3)  Erosive disease: no  Extra-articular manifestations include: pericardial effusion, pulmonary nodules and GGO    Immunosuppression Screening (11/14/2017): Quantiferon TB: negative  PPD:  Not performed  Hepatitis B: negative  Hepatitis C: negative    Therapy History includes:    Current DMARD therapy include: Enbrel (1/2018 - present)  Prior DMARD therapy include: leflunomide 20 mg daily (stopped on her own), methotrexate 17.5 mg every Sunday (CKD)  Discontinued DMARDs because of inefficacy: None  Discontinued DMARDs because of side effects: None    Immunizations:   Immunization History   Administered Date(s) Administered    Influenza High Dose Vaccine PF 10/10/2016    Pneumococcal Polysaccharide (PPSV-23) 10/10/2016    Pneumococcal Vaccine (Pcv) 10/31/2007    Tdap 04/21/2014    Zoster Vaccine, Live 10/10/2016       Active problems include:    Patient Active Problem List   Diagnosis Code    DJD (degenerative joint disease) M19.90    Osteopenia of multiple sites M85.89    Pericardial effusion I31.3    Seropositive rheumatoid arthritis ([facility] M05.9    Primary osteoarthritis of both hands M19.041, M19.042    Primary osteoarthritis of both knees M17.0    Trochanteric bursitis of both hips M70.61, M70.62    Pulmonary nodules R91.8    Abnormal CT scan, chest R93.8    Long-term use of immunosuppressant medication Z79.899    ILD (interstitial lung disease) ([facility]) J84.9    Seropositive rheumatoid arthritis of multiple sites ([facility] M05.79    Primary osteoarthritis of left knee M17.12       HISTORY OF PRESENT ILLNESS    Ms. Hidalgo Heading returns for a follow-up visit. On her last visit, I continued Enbrel monotherapy.  I also referred her to Dr. Mo Fonseca, who recommended surgery    Today, she complains of tooth aching pain in her shoulder and electric shooting pain up her arms. She is able to raise her arms now above her head and she continues to do exercises, yoga and pilate. She also does aqua therapy. She has swelling in her left knee and ankle that is constant due to fluid, associated with pain in her knee when she walks. She has stiffness in her knee lasting 30 minutes. She denies pain, swelling, or stiffness in her hands. She no longer want to take Enbrel and does not feel it is helping. She uses NSAIDs with relief. Last blood work was done on 2/14/2018 and did not reveal any significant adverse effects, except Hct 49.2%, creatinine 1.07 mg/dL and eGFR 59. Most recent inflammatory markers from 2/14/2018 revealed a ESR 17 mm/hr (previously 2, 20, 9, 4, 3, 10 mm/hr) and CRP 0.5 mg/L (previously 2.0, 0.9, 1.0, 0.8, 1.3, 0.7 mg/dL). The patient has not had any interval hospital admissions, infections, or surgeries. REVIEW OF SYSTEMS    A comprehensive review of systems was performed and pertinent results are documented in the HPI, review of systems is otherwise non-contributory. PAST MEDICAL HISTORY    She has a past medical history of Carpal tunnel syndrome; DJD (degenerative joint disease); DJD (degenerative joint disease) of knees (4/9/2013); Eczema; GERD (gastroesophageal reflux disease); ILD (interstitial lung disease) ([facility] (6/14/2017); Osteopenia of multiple sites; Post-menopausal; Primary osteoarthritis of both hands (5/27/2016); Primary osteoarthritis of both knees (5/27/2016); Pulmonary nodules (6/15/2016); and Seropositive rheumatoid arthritis ([facility] (5/27/2016). FAMILY HISTORY    Her family history includes Arthritis-osteo in her brother, father, sister, and sister; Elevated Lipids in her sister; Heart Disease (age of onset: 72) in her mother; Hypertension in her brother, maternal aunt, and maternal uncle; Stroke in her maternal aunt and maternal uncle;  Thyroid Disease in her sister. SOCIAL HISTORY    She reports that she quit smoking about 4 years ago. Her smoking use included Cigarettes. She smoked 0.10 packs per day. She has never used smokeless tobacco. She reports that she does not drink alcohol or use illicit drugs. MEDICATIONS    Current Outpatient Prescriptions   Medication Sig Dispense Refill    triamcinolone acetonide (KENALOG) 40 mg/mL injection 1 mL by Intra artICUlar route once for 1 dose. 1 mL 0    lidocaine, PF, (XYLOCAINE) 10 mg/mL (1 %) injection 1 mL by Intra artICUlar route once for 1 dose. 1 mL 0    multivitamin (ONE A DAY) tablet Take 1 Tab by mouth daily.  glucosamine-chondroitin (ARTHX) 500-400 mg cap Take 1 Cap by mouth daily.  hydroCHLOROthiazide (HYDRODIURIL) 25 mg tablet Take 1 Tab by mouth daily. Takes 1 tab daily. 90 Tab 1    etanercept (ENBREL SURECLICK) 50 mg/mL (2.90 mL) injection 0.98 mL by SubCUTAneous route every seven (7) days. 3.98 mL 6    vitamin E (AQUA GEMS) 400 unit capsule Take 400 Units by mouth daily.  OTHER Take 1,200 mg by mouth daily. Indications: Calcium and Vitamin D3 1200 mg total      ascorbic acid, vitamin C, (VITAMIN C) 500 mg tablet Take 500 mg by mouth daily. Takes 1 tab daily. ALLERGIES    No Known Allergies    PHYSICAL EXAMINATION    Visit Vitals    /78 (BP 1 Location: Left arm, BP Patient Position: Sitting)    Pulse 66    Temp 97.6 °F (36.4 °C) (Oral)    Resp 16    Ht 5' 3\" (1.6 m)    Wt 167 lb 9.6 oz (76 kg)    SpO2 94%    BMI 29.69 kg/m2     Body mass index is 29.69 kg/(m^2). General: Patient is alert, oriented x 3, not in acute distress    HEENT:   Sclerae are not injected and appear moist.  There is no alopecia. Neck is supple     Cardiovascular:  Heart is regular rate and rhythm, no murmurs. Chest:  Lungs are clear to auscultation bilaterally. No rhonchi, wheezes, or crackles.     Extremities:  Free of clubbing, cyanosis, +2 pitting edema in ankle and foot    Neurological exam:  No focal sensory deficits, muscle strength is full in upper and lower extremities     Skin exam:  There are no rashes, no alopecia, no discoid lesions, no active Raynaud's, no livedo reticularis, no periungual erythema. Musculoskeletal exam:  A comprehensive musculoskeletal exam was performed for all joints of each upper and lower extremity and assessed for swelling, tenderness and range of motion.  Positive results are documented as below:     Decreased ROM of right shoulder due to pain  Bilateral Michael and Heberden nodes  Bilateral knee crepitus without effusion with tenderness on left  Bilateral hallux valgus  Hammer toes  Left ankle and foot swelling and pain (edema)    Z-Deformities:   no  [place] Neck Deformities:  no  Boutonierre's Deformities:  no  Ulnar Deviation:   no  MCP Subluxation:  no    Joint Count 8/23/2018 5/23/2018 2/14/2018 11/14/2017 9/14/2017 6/14/2017 3/14/2017   Patient pain (0-100) - - 0 5 (No Data) 0 0   MHAQ 0.5 0.75 0 0.5 0 0 0   Left shoulder - Tender - - 1 - - - -   Left elbow - Tender - - - 1 1 - -   Left elbow - Swollen - - - 1 - - -   Left wrist- Tender - 0 - - - 0 0   Left wrist- Swollen - 1 1 1 1 1 1   Left 1st MCP - Tender - - - 1 1 - -   Left 1st MCP - Swollen - 1 1 1 1 1 -   Left 2nd MCP - Tender - - - 1 - - -   Left 2nd MCP - Swollen - - 1 1 1 1 -   Left 3rd MCP - Tender - - - 1 - - -   Left 3rd MCP - Swollen - - 1 1 - 1 -   Left 4th MCP - Swollen - - 1 - - - -   Left 5th MCP - Tender - - - 1 - - -   Left 5th MCP - Swollen - - - 1 - 1 -   Left thumb IP - Tender - - - 1 - - -   Left thumb IP - Swollen - - - - - 1 -   Left 2nd PIP - Tender 0 - 1 1 - - -   Left 2nd PIP - Swollen 1 - 1 1 - 1 -   Left 3rd PIP - Tender 0 - 1 1 - - -   Left 3rd PIP - Swollen 1 - 1 1 - 1 1   Left knee - Tender - - - 1 1 - -   Left knee - Swollen - - - 1 1 - -   Right shoulder - Tender - - 1 1 - - -   Right elbow - Tender - - - 1 1 - -   Right elbow - Swollen - - - 1 - - - Right wrist- Tender - - - 1 - - -   Right wrist- Swollen - 1 1 1 1 1 -   Right 1st MCP - Tender - - 1 1 1 - -   Right 1st MCP - Swollen - - 1 1 1 1 -   Right 2nd MCP - Tender - - - 1 - - -   Right 2nd MCP - Swollen - - - 1 - 1 -   Right 3rd MCP - Tender - - - 1 - - -   Right 3rd MCP - Swollen - 1 1 - - 1 -   Right 4th MCP - Swollen - 1 1 - - - -   Right 5th MCP - Tender - - - 1 - - -   Right 5th MCP - Swollen - - - 1 - 1 -   Right thumb IP - Tender - - - 1 - - -   Right 2nd PIP - Tender 0 - 1 1 1 - -   Right 2nd PIP - Swollen 1 1 1 1 1 1 -   Right 3rd PIP - Tender - - - 1 - - -   Right 3rd PIP - Swollen - - 1 1 1 1 1   Right 4th PIP - Tender - - - - - - -   Right 4th PIP - Swollen - - - 1 - - -   Right 5th PIP - Tender - - - 1 - - -   Right knee - Tender - - - 1 - - -   Right knee - Swollen - - - - 1 - -   Tender Joint Count (Total) 0 0 6 21 6 0 0   Swollen Joint Count (Total) 3 6 13 17 9 15 3   Physician Assessment (0-10) 1 2 4 4 3 2 1   Patient Assessment (0-10) 0.5 0.5 0 0.5 (No Data) 0 0.5   CDAI Total (calculated) 4.5 8.5 23 42.5 - 17 4.5     DATA REVIEW    Laboratory     Recent laboratory results were reviewed, summarized, and discussed with the patient. Imaging    Musculoskeletal Ultrasound    Ultrasound of the right wrist. Indication: joint swelling. (5/27/2016)  Using a [employer] e with 18 Mhz probe, limited views of the right wrist were obtained. This revealed hypoechoic collection without doppler within the midcarpal joint space. The tendons were normal. Bony contours were regular without erosions seen. There were no soft tissue masses noted. Impression: synovial hypertrophy    Radiographs    Cervical Spine 4/24/2018: There is no acute fracture or dislocation, severe disc degeneration and spondylosis seen from C2 to C6. Milder changes seen at C6-C7. Foraminal narrowing is seen bilaterally at C4-5 and 5 6. Bilateral Hand 5/27/2016: Moderate bilateral triscaphe joint osteoarthritis.  Moderate left and mild right first MC joint osteoarthritis. Moderate bilateral first MCP joint osteoarthritis. Multifocal IP joint osteoarthritis is bilateral. There are central erosions in the right first through fourth DIP joints and left third and fifth MP joints. Subtle marginal erosions in the left second DIP joint. No fracture or dislocation on plain film. Alignment is within normal limits. Bone mineralization is decreased. No soft tissue calcification. Bilateral Shoulder 5/27/2016: LEFT: demonstrate mild osteopenia. No acute fracture or dislocation. Age-appropriate AC joint osteoarthritis with marginal osteophytes. Glenohumeral joint is within normal limits for age. No evidence of erosion. RIGHT: demonstrate osteopenia. No acute fracture or dislocation. AC joint osteoarthritis includes marginal osteophytes. Glenohumeral joint osteoarthritis is age-appropriate. Cortical irregularity of the greater tuberosity of the humerus indicates underlying rotator cuff pathology    Left Knee 12/29/2015: no fracture. Joint effusion and prepatellar soft tissue contusion versus bursitis. Increased osteoarthritis. Lumbar Spine 12/29/2015: stable dextroconvex scoliosis without evidence of acute fracture or subluxation. Significant multilevel degenerative disc disease is again noted from L2-S1. The patient is status post laminectomy from L1 through to the sacrum. Multilevel facet degenerative arthritis again noted. Left Knee 8/03/2010: sclerosis and deformity of the lateral tibial plateau with associated lateral compartment osteoarthritis is suggestive of old injury. No acute fracture is seen. There is moderate to severe patellofemoral     CT Imaging    CT Chest without contrast 6/20/2017: The style windows demonstrate no adenopathy. There continues to be a moderate pericardial effusion which is unchanged when compared with 7/22/2016. No thyroid nodules. No axillary adenopathy. No adrenal lesions.  There is a small moderate hiatal hernia. Review of bone windows demonstrates no destructive lesions. Lung windows reveal a stable small solid nodule in the right middle lobe laterally. The ill-defined semisolid groundglass opacity anteriorly in left lower lobe is unchanged when compared with the CT of 3/12/2015. This should be evaluated on continue follow-up. Follow-up exam in one year is suggested. No new nodules are noted. The high-resolution images demonstrate mild changes of centrilobular emphysema. There is no evidence for fibrosis or bronchiectasis. No significant interstitial lung disease. CT Chest without contrast 7/22/2016: Small lingular and right middle lobe nodules and left lower lobe groundglass opacity all unchanged. This represents a 16 month follow-up examination. The small middle lobe nodule and groundglass opacity in the left lower lobe are essentially unchanged in comparison with 3/9/2011. CT Chest with contrast 1/27/2016: nodules in both lungs including a soft more groundglass appearance nodule in the left lower lobe are stable when compared to the previous examination. No new acute finding or new nodule. Small pericardial effusion. Hiatal hernia. CT Chest without contrast 6/15/2015: stable semisolid nodule anteriorly in left lower lobe. The other nodules are stable. Decreasing pericardial effusion. Stable adenopathy. She underwent a fine needle biopsy which showed abundant benign and reactive bronchial epithelial cells and no malignancy. CT Chest without contrast 5/13/2015: a suspicious semisolid nodule in the anterior segment of the left lower lobe just posterior to the fissure with some retraction of the fissure. This measures 1.2 x 1 x 1.4 cm. PET/CT negative for an 4/10/2015. Moderate stable pericardial effusion. Stable enlarged lymph nodes as described. Several other small pulmonary nodules also stable.     PET/CT Scan 4/10/2015: showed the vague semisolid nodule in the left lower lobe demonstrates no increased metabolic activity on PET suggesting benign etiology but is nonspecific and neoplasm is not entirely excluded. There are 2 lymph nodes in the right neck which are normal in size and demonstrate slight increased metabolic activity nonspecific most likely reactive. CT Heart without contrast with calcium 3/12/2015: total calcium score of 0. A low score suggests a low likelihood of coronary disease but does not exclude the possibility of significant coronary artery narrowing. 3 mm right middle lobe lung nodule unchanged. Pericardial effusion. Hiatal hernia. A CT chest without contrast showed a suspicious semisolid nodule in the anterior segment of the left lower lobe just posterior to the fissure with some retraction of the fissure. This measures 1.2 x 1 x 1.4 cm. This could represent a small bronchogenic carcinoma. Moderate pericardial effusion clinical correlation is recommended. 2 enlarged lymph nodes as described. Several other small pulmonary nodules can be evaluated on followup 23X    CT Heart without contrast with calcium 3/09/2011: total calcium score of 0. A low score suggests a low likelihood of coronary disease but does not exclude the possibility of significant coronary artery narrowing. Small pericardial effusion. 3 mm pulmonary nodule right middle lobe. 7 mm density left lower lobe. MR Imaging    MRI Cervical Spine without contrast 5/07/2018: Congenital narrowing of the cervical canal. Loss of normal cervical lordosis. Vertebral body heights are maintained. Degenerative marrow signal changes. The craniocervical junction is intact. The course, caliber, and signal intensity of the spinal cord are normal. The paraspinal soft tissues are within normal limits. C2-C3:  Disc bulge/osteophyte. Facet arthropathy. Mild canal stenosis. Severe left foraminal stenosis. C3-C4:  Mild facet arthropathy. Disc bulge/osteophyte. Mild canal stenosis. Foramina are patent. C4-C5:  Disc bulge/ossify. Facet arthropathy. Moderate canal stenosis. Severe right and moderate left foraminal stenosis. C5-C6:  Disc bulge/osteophyte. Facet arthropathy. Mild to moderate canal stenosis. Foramina are patent. C6-C7:  Disc bulge/osteophyte. Mild facet arthropathy. Mild central canal stenosis. Foramina are patent. C7-T1:  Mild facet arthropathy. Disc bulge. Mild canal stenosis. Foramina are patent. Mild disc protrusion at T1-T2. Canal is patent. Mild right foraminal stenosis. Small Schmorl's node along the inferior endplate of T2. Degenerative marrow signal changes are most pronounced at C5-C6. DXA    DXA  10/06/2017: (excluded None) showed lumbar spine L1-L4 T score 1.2 (BMD 1.324 g/cm2), left femoral neck T score: -2.3 (0.721 g/cm2), left total hip T score: -2.1 (0.748 g/cm2), right femoral neck T score: -2.1 (0.749g/cm2), right total hip T score: -1.4 (0.834 g/cm2), and distal one third left radius T score N/A (BMD N/A g/cm2). FRAX score N/A % probability in 10 years for major osteoporotic fracture and N/A % 10 year probability of hip fracture. DXA 5/21/2015: lumbar spine L1-L4 T score 1.7 (BMD 1.389 g/cm2), left femoral neck T score: -2.2 (0.728 g/cm2), right femoral neck  T score: -1.4 (0.825 g/cm2), total hip T score: -2.2 (0.728 g/cm2). Echocardiogram    Echocardiogram 6/03/2015: LEFT VENTRICLE: Size was normal. Systolic function was normal. Ejection fraction was estimated in the range of 60 % to 65 %. There were no regional wall motion abnormalities. Wall thickness was mildly increased. DOPPLER: Doppler parameters were consistent with abnormal left ventricular relaxation (grade 1 diastolic dysfunction). RIGHT VENTRICLE: The size was normal. Systolic function was normal. LEFT ATRIUM: Size was normal. LA volume index was 23 ml/m squared. ATRIAL SEPTUM: There was a small atrial septal aneurysm. There was no evidence of shunt on color flow imaging. RIGHT ATRIUM: Size was normal. MITRAL VALVE: Normal valve structure.  DOPPLER: There was trivial regurgitation. AORTIC VALVE: Normal valve structure. DOPPLER: Transaortic velocity was within the normal range. There was no stenosis. There was no significant regurgitation. TRICUSPID VALVE: Normal valve structure. DOPPLER: There was mild regurgitation. Right atrial pressure estimate: 5 mmHg. Pulmonary artery systolic pressure: 25 mmHg. There was no pulmonary hypertension. PULMONIC VALVE: Not well visualized. DOPPLER: The transpulmonic velocity was within the normal range. There was trivial regurgitation. AORTA: The root exhibited normal size. SYSTEMIC VEINS: IVC: The inferior vena cava was normal in size. PERICARDIUM: A small pericardial effusion was identified along the right atrial free wall. There was no evidence of hemodynamic compromise. PFT    PFT 6/20/2017: FVC of 1.78 (>80%), FEV1 of 1.31 (85%), FEV1/FVC of 73.82% and a DLCO of 19.65 (116%). PATHOLOGY    Fine Need Aspirate of LLL 6/15/2015: Abundant benign and reactive bronchial epithelial cells. No cells diagnostic for malignancy    PROCEDURE     Right Shoulder Kenalog 40 mg IA. (2/14/18)  Right Shoulder Kenalog 40 mg IA. (11/14/17)   Bilateral Trochanteric Bursitis Kenalog 40 mg IB. (2/14/17)   Bilateral Knee Kenalog 40 mg IA. (11/16/16)    PROCEDURE     Indications:   Symptom relief from Left Knee Osteoarthritis. (08/23/18)     Procedure:   After consent was obtained, and timeout performed, using sterile technique the Left knee joint was prepped using Chlorprep. Local anesthetic used: Ethyl Chloride. The joint was entered and 0 ml's of fluid was withdrawn. Kenalog 40 mg was mixed with 1% lidocaine 1 ml and injected into the joint and the needle withdrawn. The procedure was well tolerated. The patient was asked to continue to rest the joint for a few more days before resuming regular activities. It may be more painful for the first 1-2 days. Watch for fever, or increased swelling or persistent pain in the joint.  Call or return to clinic as needed if such symptoms occur or there is failure to improve as anticipated. ASSESSMENT AND PLAN    This is a follow-up visit for Ms. Marilu Cagle. 1) Seropositive Rheumatoid Arthritis with Interstitial Lung Disease. She is on Enbrel monotherapy. She stopped leflunomide after completing her tablets and has not felt worse. She wants to stop Enbrel after she runs out. Her CDAI 4.5 (previously 8.5, 8.5, 23, 42.5, N/A 17, 4.5, 7.5, 8, 16, 15.5, 19), with 0 tender and 3 swollen, consistent with low disease activity. 2) Long Term Use of Immunosuppressants. The patient remains on immunomodulatory medications (Enbrel) and requires frequent toxicity monitoring by blood work. Respective labs were ordered (CBC and CMP). 3) Bilateral Hands and Knee Osteoarthritis. I injected her left knee today with good tolerance. 4) Bilateral Trochanteric Bursitis. This was not an active issue today. 5) Osteopenia. Her 10/06/2017 DXA showed left femoral neck T score: -2.3 (0.721 g/cm2). I will continue to monitor. 6) Pericardial Effusion. This is chronic and asymptomatic. 7) Pulmonary Nodule and Ground Glass Opacities. The ill-defined semisolid groundglass opacity anteriorly in left lower lobe is unchanged when compared with the CT of 3/12/2015. CT Chest and PFTs did not show active disease or pulmonary compromise. 8) Chronic Lower Back Pain. She has a spinal cord stimulator. This was not an active issue today. 9) CKD Stage 2. Her creatinine was 1.07 mg/dL and eGFR 29 (previously 1.05, 1.02, 0.88 mg/dL and eGFR 60, 54, 75). I recommend against NSAIDs. 10) Cervical Radiculopathy due to Neuroforaminal Stenosis. This is the source of her shoulder pain. I referred her to Dr. Nano Bradford, who recommended surgery, but she does not want ot do surgery. She will be seeing a seeing opinion at [address] [place] [address]. She had done physical therapy with some improvement. Decardron did not help.     The patient voiced understanding of the aforementioned assessment and plan. Summary of plan was provided in the After Visit Summary patient instructions.      TODAY'S ORDERS    Orders Placed This Encounter    TRIAMCINOLONE ACETONIDE INJ    20610 - DRAIN/INJECT LARGE JOINT/BURSA    triamcinolone acetonide (KENALOG) 40 mg/mL injection    lidocaine, PF, (XYLOCAINE) 10 mg/mL (1 %) injection     Future Appointments  Date Time Provider Franny Willis   9/5/2018 10:00 AM [facility] CT ER 1 [facility]   11/21/2018 10:00 AM MD Gladis Maguire MD, [address]    Adult Rheumatology   Rheumatology Ultrasound Certified  [facility]  [facility] of [facility], [address] [place] [address]   Phone [phone]  Fax [phone]

## 2018-08-23 NOTE — LETTER
DATE OF PROCEDURE: 2018 PROCEDURE PERFORMED BY:  Una Colin MD 
 
Patient: Dea Cabral MRN: [id number] Patient : 1940 PROCEDURE INFORMED CONSENT FORM Page 1 of 2 You have been given information about your condition and the recommended surgical, medical or diagnostic procedure(s) to be used. This consent form is designed to provide a written confirmation of such discussions by recording some of the more significant medical information given to you. It is intended to give you clear information so that you may give or withhold your consent to the proposed procedure(s). left knee triamcinolone 40mg injection without ultrasound-guidance 
 
______________________________________________________________________ Criselda Muster of Procedure: My provider has discussed the details of my medical condition, the nature of the proposed procedure and the benefits to be reasonably expected compared with alternative treatment approaches. My provider has also discussed the risks and benefits of not receiving this treatment or undergoing the procedure(s). Alternatives to the Procedure(s):  My provider has discussed the alternatives available including the risk and benefits of the alternatives. Disposal of Tissues:  Tissues surgically removed will be disposed of in accordance with standard hospital practice. Photographs: N/A  I authorize the hospital to make photographs, motion pictures or video tapes for my medical records and, if I am not identified, for purposes of quality monitoring or education. Risks and Hazards: My provider has discussed the usual and most frequent risks and hazards of this procedure including but not limited to: bleeding, infection, allergic reaction, scarring, blanching of injection site, not working 
____________________________________________________________________________________________________________________________________________ No Guarantee:  My provider has represented that no guarantee has been made concerning the results of this procedure. Patient: Monik Santiago MRN: [id number] Patient : 1940 PROCEDURE INFORMED CONSENT FORM Page 2 of 2 PERMISSION Wayne Qureshi MD has explained all of the above to my satisfaction and understanding. All of my questions have been answered. I understand I can ask further questions, but do not request further explanation at this time. I hereby authorize Wayne Qureshi MD to perform all procedures described on the front of this form. This/these consent(s) remain effective unless revoked by me. 
 
 
_________________________________________________ Wayne Qureshi MD           
 
Date: 2018 Time: 10:29 AM 
 
_________________________________________________ SIGNATURE: Patient, Parent or Person Legally Authorized to Consent                  
 
_________________________________________________ Relationship/Explanation 
 
_________________________________________________ Witness/Confirmation of Signature Date: 2018 Time: 10:29 AM 
 
  
          Revised Date:  8/15/18

## 2018-09-05 ENCOUNTER — HOSPITAL ENCOUNTER (OUTPATIENT)
Dept: CT IMAGING | Age: 78
Discharge: HOME | End: 2018-09-05
Attending: PHYSICIAN ASSISTANT
Payer: MEDICARE

## 2018-09-05 DIAGNOSIS — R91.8 LUNG MASS: ICD-10-CM

## 2018-09-05 PROCEDURE — 71250 CT THORAX DX C-: CPT

## 2018-09-24 ENCOUNTER — HOSPITAL ENCOUNTER (OUTPATIENT)
Dept: PET IMAGING | Age: 78
Discharge: HOME | End: 2018-09-24
Attending: PHYSICIAN ASSISTANT
Payer: MEDICARE

## 2018-09-24 VITALS — BODY MASS INDEX: 27.49 KG/M2 | WEIGHT: 165 LBS | HEIGHT: 65 IN

## 2018-09-24 DIAGNOSIS — R93.89 ABNORMAL CHEST CT: ICD-10-CM

## 2018-09-24 PROCEDURE — 78815 PET IMAGE W/CT SKULL-THIGH: CPT

## 2018-09-24 RX ORDER — SODIUM CHLORIDE 0.9 % (FLUSH) 0.9 %
10 SYRINGE (ML) INJECTION
Status: COMPLETED | OUTPATIENT
Start: 2018-09-24 | End: 2018-09-24

## 2018-09-24 RX ADMIN — Medication 10 ML: at 12:10

## 2018-10-09 RX ORDER — SODIUM CHLORIDE, SODIUM LACTATE, POTASSIUM CHLORIDE, CALCIUM CHLORIDE 600; 310; 30; 20 MG/100ML; MG/100ML; MG/100ML; MG/100ML
125 INJECTION, SOLUTION INTRAVENOUS CONTINUOUS
Status: CANCELLED | OUTPATIENT
Start: 2018-10-09

## 2018-10-09 RX ORDER — SODIUM CHLORIDE, SODIUM LACTATE, POTASSIUM CHLORIDE, CALCIUM CHLORIDE 600; 310; 30; 20 MG/100ML; MG/100ML; MG/100ML; MG/100ML
125 INJECTION, SOLUTION INTRAVENOUS CONTINUOUS
Status: CANCELLED | OUTPATIENT
Start: 2018-10-09 | End: 2018-10-10

## 2018-10-09 RX ORDER — MIDAZOLAM HYDROCHLORIDE 1 MG/ML
1 INJECTION, SOLUTION INTRAMUSCULAR; INTRAVENOUS AS NEEDED
Status: CANCELLED | OUTPATIENT
Start: 2018-10-09

## 2018-10-09 RX ORDER — FENTANYL CITRATE 50 UG/ML
50 INJECTION, SOLUTION INTRAMUSCULAR; INTRAVENOUS AS NEEDED
Status: CANCELLED | OUTPATIENT
Start: 2018-10-09

## 2018-10-09 RX ORDER — OXYCODONE AND ACETAMINOPHEN 5; 325 MG/1; MG/1
1 TABLET ORAL AS NEEDED
Status: CANCELLED | OUTPATIENT
Start: 2018-10-09

## 2018-10-09 RX ORDER — SODIUM CHLORIDE 0.9 % (FLUSH) 0.9 %
5-10 SYRINGE (ML) INJECTION AS NEEDED
Status: CANCELLED | OUTPATIENT
Start: 2018-10-09

## 2018-10-09 RX ORDER — MORPHINE SULFATE 10 MG/ML
2 INJECTION, SOLUTION INTRAMUSCULAR; INTRAVENOUS
Status: CANCELLED | OUTPATIENT
Start: 2018-10-09

## 2018-10-09 RX ORDER — SODIUM CHLORIDE 9 MG/ML
50 INJECTION, SOLUTION INTRAVENOUS CONTINUOUS
Status: CANCELLED | OUTPATIENT
Start: 2018-10-09 | End: 2018-10-10

## 2018-10-09 RX ORDER — ONDANSETRON 2 MG/ML
4 INJECTION INTRAMUSCULAR; INTRAVENOUS AS NEEDED
Status: CANCELLED | OUTPATIENT
Start: 2018-10-09

## 2018-10-09 RX ORDER — FENTANYL CITRATE 50 UG/ML
25 INJECTION, SOLUTION INTRAMUSCULAR; INTRAVENOUS
Status: CANCELLED | OUTPATIENT
Start: 2018-10-09

## 2018-10-09 RX ORDER — LIDOCAINE HYDROCHLORIDE 10 MG/ML
0.1 INJECTION, SOLUTION EPIDURAL; INFILTRATION; INTRACAUDAL; PERINEURAL AS NEEDED
Status: CANCELLED | OUTPATIENT
Start: 2018-10-09

## 2018-10-09 RX ORDER — SODIUM CHLORIDE 0.9 % (FLUSH) 0.9 %
5-10 SYRINGE (ML) INJECTION EVERY 8 HOURS
Status: CANCELLED | OUTPATIENT
Start: 2018-10-09

## 2018-10-09 RX ORDER — DIPHENHYDRAMINE HYDROCHLORIDE 50 MG/ML
12.5 INJECTION, SOLUTION INTRAMUSCULAR; INTRAVENOUS AS NEEDED
Status: CANCELLED | OUTPATIENT
Start: 2018-10-09 | End: 2018-10-09

## 2018-10-09 RX ORDER — MIDAZOLAM HYDROCHLORIDE 1 MG/ML
0.5 INJECTION, SOLUTION INTRAMUSCULAR; INTRAVENOUS
Status: CANCELLED | OUTPATIENT
Start: 2018-10-09

## 2018-10-09 RX ORDER — SODIUM CHLORIDE 9 MG/ML
1000 INJECTION, SOLUTION INTRAVENOUS CONTINUOUS
Status: CANCELLED | OUTPATIENT
Start: 2018-10-09

## 2018-10-10 ENCOUNTER — APPOINTMENT (OUTPATIENT)
Dept: CT IMAGING | Age: 78
End: 2018-10-10
Attending: INTERNAL MEDICINE
Payer: MEDICARE

## 2018-10-10 ENCOUNTER — HOSPITAL ENCOUNTER (OUTPATIENT)
Age: 78
Setting detail: OUTPATIENT SURGERY
Discharge: HOME | End: 2018-10-10
Attending: INTERNAL MEDICINE | Admitting: INTERNAL MEDICINE
Payer: MEDICARE

## 2018-10-10 DIAGNOSIS — R93.89 ABNORMAL CHEST CT: ICD-10-CM

## 2018-10-10 PROCEDURE — 71250 CT THORAX DX C-: CPT

## 2019-02-21 DIAGNOSIS — I10 HTN (HYPERTENSION), BENIGN: ICD-10-CM

## 2019-02-21 NOTE — TELEPHONE ENCOUNTER
Pharmacy is requesting medication refill for a  90day supply    Requested Prescriptions     Pending Prescriptions Disp Refills    hydroCHLOROthiazide (HYDRODIURIL) 25 mg tablet 90 Tab 1     Sig: Take 1 Tab by mouth daily. Takes 1 tab daily.        Pharmacy on file verified  ([facility] [phone])

## 2019-02-22 RX ORDER — HYDROCHLOROTHIAZIDE 25 MG/1
25 TABLET ORAL DAILY
Qty: 90 TAB | Refills: 0 | OUTPATIENT
Start: 2019-02-22

## 2019-02-25 DIAGNOSIS — I10 HTN (HYPERTENSION), BENIGN: ICD-10-CM

## 2019-02-25 RX ORDER — HYDROCHLOROTHIAZIDE 25 MG/1
TABLET ORAL
Qty: 90 TAB | Refills: 0 | Status: SHIPPED | OUTPATIENT
Start: 2019-02-25 | End: 2019-05-25 | Stop reason: SDUPTHER

## 2019-05-25 DIAGNOSIS — I10 HTN (HYPERTENSION), BENIGN: ICD-10-CM

## 2019-05-25 RX ORDER — HYDROCHLOROTHIAZIDE 25 MG/1
TABLET ORAL
Qty: 90 TAB | Refills: 0 | Status: SHIPPED | OUTPATIENT
Start: 2019-05-25 | End: 2019-08-24 | Stop reason: SDUPTHER

## 2019-08-24 DIAGNOSIS — I10 HTN (HYPERTENSION), BENIGN: ICD-10-CM

## 2019-08-24 RX ORDER — HYDROCHLOROTHIAZIDE 25 MG/1
TABLET ORAL
Qty: 90 TAB | Refills: 0 | Status: SHIPPED | OUTPATIENT
Start: 2019-08-24 | End: 2019-11-19 | Stop reason: SDUPTHER

## 2019-11-19 DIAGNOSIS — I10 HTN (HYPERTENSION), BENIGN: ICD-10-CM

## 2019-11-19 RX ORDER — HYDROCHLOROTHIAZIDE 25 MG/1
TABLET ORAL
Qty: 30 TAB | Refills: 0 | Status: SHIPPED | OUTPATIENT
Start: 2019-11-19 | End: 2020-01-03

## 2019-12-15 NOTE — TELEPHONE ENCOUNTER
Ta Jernigan called from [facility] because he needs clarification on the prescription for the Decadron. A call back number is [phone], and you can speak to anyone regarding this. Patient/Caregiver provided printed discharge information.

## 2020-01-01 DIAGNOSIS — I10 HTN (HYPERTENSION), BENIGN: ICD-10-CM

## 2020-01-03 DIAGNOSIS — I10 HTN (HYPERTENSION), BENIGN: ICD-10-CM

## 2020-01-03 RX ORDER — HYDROCHLOROTHIAZIDE 25 MG/1
TABLET ORAL
Qty: 30 TAB | Refills: 0 | Status: SHIPPED | OUTPATIENT
Start: 2020-01-03 | End: 2020-01-03

## 2020-01-03 RX ORDER — HYDROCHLOROTHIAZIDE 25 MG/1
TABLET ORAL
Qty: 30 TAB | Refills: 0 | Status: SHIPPED | OUTPATIENT
Start: 2020-01-03 | End: 2020-12-23 | Stop reason: ALTCHOICE

## 2020-09-10 ENCOUNTER — OFFICE VISIT (OUTPATIENT)
Dept: GASTROENTEROLOGY | Age: 80
End: 2020-09-10
Payer: MEDICARE

## 2020-09-10 VITALS
DIASTOLIC BLOOD PRESSURE: 70 MMHG | HEART RATE: 94 BPM | RESPIRATION RATE: 20 BRPM | HEIGHT: 65 IN | SYSTOLIC BLOOD PRESSURE: 140 MMHG | WEIGHT: 160.2 LBS | TEMPERATURE: 97.3 F | BODY MASS INDEX: 26.69 KG/M2

## 2020-09-10 VITALS
TEMPERATURE: 98.2 F | HEIGHT: 65 IN | WEIGHT: 160.5 LBS | DIASTOLIC BLOOD PRESSURE: 70 MMHG | HEART RATE: 67 BPM | OXYGEN SATURATION: 97 % | SYSTOLIC BLOOD PRESSURE: 130 MMHG | RESPIRATION RATE: 16 BRPM | BODY MASS INDEX: 26.74 KG/M2

## 2020-09-10 DIAGNOSIS — R10.13 EPIGASTRIC PAIN: Primary | ICD-10-CM

## 2020-09-10 DIAGNOSIS — M16.0 OSTEOARTHRITIS OF BOTH HIPS, UNSPECIFIED OSTEOARTHRITIS TYPE: ICD-10-CM

## 2020-09-10 DIAGNOSIS — M05.79 SEROPOSITIVE RHEUMATOID ARTHRITIS OF MULTIPLE SITES (HCC): ICD-10-CM

## 2020-09-10 DIAGNOSIS — K44.9 HIATAL HERNIA: ICD-10-CM

## 2020-09-10 PROBLEM — I10 HTN (HYPERTENSION): Status: ACTIVE | Noted: 2020-09-10

## 2020-09-10 PROBLEM — K57.90 DIVERTICULOSIS: Status: ACTIVE | Noted: 2020-09-10

## 2020-09-10 PROBLEM — M79.604 RIGHT LEG PAIN: Status: ACTIVE | Noted: 2020-09-10

## 2020-09-10 PROBLEM — R10.9 ABDOMINAL PAIN: Status: ACTIVE | Noted: 2020-09-10

## 2020-09-10 PROBLEM — R06.2 WHEEZING: Status: ACTIVE | Noted: 2020-09-10

## 2020-09-10 PROCEDURE — 99204 OFFICE O/P NEW MOD 45 MIN: CPT | Performed by: INTERNAL MEDICINE

## 2020-09-10 RX ORDER — AMLODIPINE BESYLATE 10 MG/1
TABLET ORAL
COMMUNITY
Start: 2020-08-06

## 2020-09-10 RX ORDER — ACETAMINOPHEN 500 MG
TABLET ORAL 2 TIMES DAILY
COMMUNITY

## 2020-09-10 NOTE — PROGRESS NOTES
1. Have you been to the ER, urgent care clinic since your last visit? Hospitalized since your last visit? NO    2. Have you seen or consulted any other health care providers outside of the [address] [place] [address] since your last visit? Include any pap smears or colon screening. YES 2018 COLONOSCOPY IN [facility], DR Demetra Henry. Sakina Perez

## 2020-09-10 NOTE — H&P (VIEW-ONLY)
Cristobal Clinton is a 70 y.o. female who presents today for the following: Chief Complaint Patient presents with  Abdominal Pain  
  hx of diverticulosis No Known Allergies Current Outpatient Medications Medication Sig  
 amLODIPine (NORVASC) 10 mg tablet TAKE 1 TABLET BY MOUTH EVERY DAY  cholecalciferol (VITAMIN D3) (2,000 UNITS /50 MCG) cap capsule Take  by mouth two (2) times a day.  multivitamin (ONE A DAY) tablet Take 1 Tab by mouth daily.  vitamin E (AQUA GEMS) 400 unit capsule Take 400 Units by mouth daily.  ascorbic acid, vitamin C, (VITAMIN C) 500 mg tablet Take 500 mg by mouth daily. Takes 1 tab daily.  hydroCHLOROthiazide (HYDRODIURIL) 25 mg tablet TAKE 1 TABLET BY MOUTH EVERY DAY  glucosamine-chondroitin (ARTHX) 500-400 mg cap Take 1 Cap by mouth daily.  etanercept (ENBREL SURECLICK) 50 mg/mL (9.45 mL) injection 0.98 mL by SubCUTAneous route every seven (7) days. No current facility-administered medications for this visit. Past Medical History:  
Diagnosis Date  Abdominal pain 9/10/2020  Arthritis   
 since age 17  Carpal tunnel syndrome  Diverticulosis 9/10/2020  DJD (degenerative joint disease)   
 hip/spine lumbar spine with spinal stenosis  DJD (degenerative joint disease) of knees 4/9/2013 Ortho, [facility] Dr Trenton Chanel  GERD (gastroesophageal reflux disease)  HTN (hypertension) 9/10/2020  ILD (interstitial lung disease) ([facility] 6/14/2017  Osteopenia of multiple sites Hip; 5/21/15  Post-menopausal   
 Primary osteoarthritis of both hands 5/27/2016  Primary osteoarthritis of both knees 5/27/2016  Pulmonary nodules 6/15/2016  Right leg pain 9/10/2020  Seropositive rheumatoid arthritis ([facility] 5/27/2016  Wheezing 9/10/2020 Past Surgical History:  
Procedure Laterality Date  COLONOSCOPY  2007 DR Kwadwo Florez COLONOSCOPY  2018  DR Jose Guadalupe [facility]  
 ENDOSCOPY, COLON, DIAGNOSTIC  2007 -repeat in 5 years  HX CATARACT REMOVAL Right 10/2015  HX HYSTERECTOMY  's  
 w/partial ovariectomy r/t endometriosis by Dr. Roya Staples  HX LUMBAR FUSION  2003  
 [facility]  IR INJ EPIDURAL LUMBAR SACRAL  10/2004 Family History Problem Relation Age of Onset  Heart Disease Mother 72 PTCA  Elevated Lipids Sister  Hypertension Brother  Arthritis-osteo Brother  Hypertension Maternal Aunt  Stroke Maternal Aunt  Stroke Maternal Uncle  Hypertension Maternal Uncle  Arthritis-osteo Sister  Arthritis-osteo Sister  Thyroid Disease Sister  Arthritis-osteo Father   
     knees Social History Socioeconomic History  Marital status:  Spouse name: Not on file  Number of children: Not on file  Years of education: Not on file  Highest education level: Not on file Occupational History  Not on file Social Needs  Financial resource strain: Not on file  Food insecurity Worry: Not on file Inability: Not on file  Transportation needs Medical: Not on file Non-medical: Not on file Tobacco Use  Smoking status: Former Smoker Packs/day: 0.10 Types: Cigarettes Last attempt to quit: 2014 Years since quittin.4  Smokeless tobacco: Never Used  Tobacco comment: 40 years of .5 pack/day prior to quit 2012, relapsed. Substance and Sexual Activity  Alcohol use: No  
 Drug use: No  
 Sexual activity: Yes  
  Partners: Male Lifestyle  Physical activity Days per week: Not on file Minutes per session: Not on file  Stress: Not on file Relationships  Social connections Talks on phone: Not on file Gets together: Not on file Attends Confucianism service: Not on file Active member of club or organization: Not on file Attends meetings of clubs or organizations: Not on file Relationship status: Not on file  Intimate partner violence Fear of current or ex partner: Not on file Emotionally abused: Not on file Physically abused: Not on file Forced sexual activity: Not on file Other Topics Concern   Service No  
 Blood Transfusions No  
 Caffeine Concern No  
 Occupational Exposure No  
 Hobby Hazards No  
 Sleep Concern No  
 Stress Concern No  
 Weight Concern No  
 Special Diet No  
 Back Care No  
 Exercise Yes  Bike Helmet No  
 Seat Belt Yes  Self-Exams Yes Social History Narrative  Not on file HPI 
68-year-old female with history of rheumatoid arthritis, osteoarthritis, degenerative joint disease, diverticular disease, hypertension, and osteopenia who comes in for evaluation of epigastric abdominal pain. States she intermittently develops pain in the epigastrium and lower chest which is severe at times. She states if she belches it does improve some. She states she has a history of a hiatal hernia. She is present not on any medication for acid reflux. Patient states she also has pain in the left lower quadrant at times. She had an ultrasound of the area done in the past which was unremarkable. Patient states the pain is intermittently severe but always low-grade in the epigastric region. She states that she overeats that makes the pain worse and even causes nausea. He states the symptoms have been present for approximately 1 year. Patient states she does not take any NSAIDs. She takes Tylenol for pain. Patient states she no longer takes her medications for rheumatoid arthritis or NSAIDs because it was causing her kidney problems. Patient states she has had multiple colonoscopies in the past with the last being 2018 no prior EGD. Review of Systems Constitutional: Negative. HENT: Negative. Eyes: Negative. Respiratory: Negative. Cardiovascular: Negative.    
Gastrointestinal: Positive for abdominal pain, heartburn and nausea. Negative for blood in stool, constipation, diarrhea, melena and vomiting. Genitourinary: Negative. Musculoskeletal: Positive for back pain and joint pain. Skin: Negative. Neurological: Negative. Endo/Heme/Allergies: Negative. Psychiatric/Behavioral: Negative. All other systems reviewed and are negative. Visit Vitals /70 (BP 1 Location: Left arm, BP Patient Position: Sitting) Pulse 67 Temp 98.2 °F (36.8 °C) (Skin) Resp 16 Ht 5' 5\" (1.651 m) Wt 72.8 kg (160 lb 8 oz) SpO2 97% Comment: on room air BMI 26.71 kg/m² Physical Exam 
Vitals signs and nursing note reviewed. Constitutional:   
   Appearance: Normal appearance. She is obese. HENT:  
   Head: Normocephalic and atraumatic. Nose: Nose normal.  
   Mouth/Throat:  
   Mouth: Mucous membranes are moist.  
   Pharynx: Oropharynx is clear. Eyes:  
   Conjunctiva/sclera: Conjunctivae normal.  
   Pupils: Pupils are equal, round, and reactive to light. Neck: Musculoskeletal: Normal range of motion and neck supple. Cardiovascular:  
   Rate and Rhythm: Normal rate and regular rhythm. Pulses: Normal pulses. Heart sounds: Normal heart sounds. Pulmonary:  
   Effort: Pulmonary effort is normal.  
   Breath sounds: Normal breath sounds. Abdominal:  
   General: Bowel sounds are normal. There is no distension. Palpations: Abdomen is soft. There is no mass. Tenderness: There is abdominal tenderness. There is no right CVA tenderness, left CVA tenderness, guarding or rebound. Hernia: No hernia is present. Musculoskeletal: Normal range of motion. General: Swelling, tenderness and deformity present. Skin: 
   General: Skin is warm and dry. Neurological:  
   General: No focal deficit present. Mental Status: She is alert and oriented to person, place, and time.   
Psychiatric:     
   Mood and Affect: Mood normal.     
 Behavior: Behavior normal.     
   Thought Content: Thought content normal.     
   Judgment: Judgment normal.  
 
  
 
 
1. Epigastric pain Rule out peptic ulcer disease versus gastritis versus gastroesophageal reflux disease. - UPPER GI ENDOSCOPY,DIAGNOSIS; Future 2. Hiatal hernia 3. [facility] 4. Osteoarthritis of both hips, unspecified osteoarthritis type

## 2020-09-10 NOTE — PROGRESS NOTES
Precious Post is a 70 y.o. female who presents today for the following:  Chief Complaint   Patient presents with    Abdominal Pain     hx of diverticulosis         No Known Allergies    Current Outpatient Medications   Medication Sig    amLODIPine (NORVASC) 10 mg tablet TAKE 1 TABLET BY MOUTH EVERY DAY    cholecalciferol (VITAMIN D3) (2,000 UNITS /50 MCG) cap capsule Take  by mouth two (2) times a day.  multivitamin (ONE A DAY) tablet Take 1 Tab by mouth daily.  vitamin E (AQUA GEMS) 400 unit capsule Take 400 Units by mouth daily.  ascorbic acid, vitamin C, (VITAMIN C) 500 mg tablet Take 500 mg by mouth daily. Takes 1 tab daily.  hydroCHLOROthiazide (HYDRODIURIL) 25 mg tablet TAKE 1 TABLET BY MOUTH EVERY DAY    glucosamine-chondroitin (ARTHX) 500-400 mg cap Take 1 Cap by mouth daily.  etanercept (ENBREL SURECLICK) 50 mg/mL (9.84 mL) injection 0.98 mL by SubCUTAneous route every seven (7) days. No current facility-administered medications for this visit.         Past Medical History:   Diagnosis Date    Abdominal pain 9/10/2020    Arthritis     since age 14    Carpal tunnel syndrome     Diverticulosis 9/10/2020    DJD (degenerative joint disease)     hip/spine lumbar spine with spinal stenosis     DJD (degenerative joint disease) of knees 4/9/2013    Ortho, [facility] Dr Halle Ramirez GERD (gastroesophageal reflux disease)     HTN (hypertension) 9/10/2020    ILD (interstitial lung disease) ([facility] 6/14/2017    Osteopenia of multiple sites     Hip; 5/21/15     Post-menopausal     Primary osteoarthritis of both hands 5/27/2016    Primary osteoarthritis of both knees 5/27/2016    Pulmonary nodules 6/15/2016    Right leg pain 9/10/2020    Seropositive rheumatoid arthritis ([facility] 5/27/2016    Wheezing 9/10/2020       Past Surgical History:   Procedure Laterality Date    COLONOSCOPY  2007    DR Rogerio Nuno    COLONOSCOPY  2018    DR PARISH HARDEN    ENDOSCOPY, COLON, DIAGNOSTIC  2007    -repeat in 5 years    HX CATARACT REMOVAL Right 10/2015    HX HYSTERECTOMY  's    w/partial ovariectomy r/t endometriosis by Dr. Jodee Wright 82  2003    bone stimulator Dr. Hemphill Boys at [address]  10/2004       Family History   Problem Relation Age of Onset    Heart Disease Mother 72        PTCA    Elevated Lipids Sister     Hypertension Brother     Arthritis-osteo Brother     Hypertension Maternal Aunt     Stroke Maternal Aunt     Stroke Maternal Uncle     Hypertension Maternal Uncle     Arthritis-osteo Sister    Reginold Aviles Sister     Thyroid Disease Sister     Arthritis-osteo Father         knees       Social History     Socioeconomic History    Marital status:      Spouse name: Not on file    Number of children: Not on file    Years of education: Not on file    Highest education level: Not on file   Occupational History    Not on file   Social Needs    Financial resource strain: Not on file    Food insecurity     Worry: Not on file     Inability: Not on file   [employer] needs     Medical: Not on file     Non-medical: Not on file   Tobacco Use    Smoking status: Former Smoker     Packs/day: 0.10     Types: Cigarettes     Last attempt to quit: 2014     Years since quittin.4    Smokeless tobacco: Never Used    Tobacco comment: 40 years of .5 pack/day prior to quit 2012, relapsed.    Substance and Sexual Activity    Alcohol use: No    Drug use: No    Sexual activity: Yes     Partners: Male   Lifestyle    Physical activity     Days per week: Not on file     Minutes per session: Not on file    Stress: Not on file   Relationships    Social connections     Talks on phone: Not on file     Gets together: Not on file     Attends Sabianism service: Not on file     Active member of club or organization: Not on file     Attends meetings of clubs or organizations: Not on file Relationship status: Not on file    Intimate partner violence     Fear of current or ex partner: Not on file     Emotionally abused: Not on file     Physically abused: Not on file     Forced sexual activity: Not on file   Other Topics Concern     Service No    Blood Transfusions No    Caffeine Concern No    Occupational Exposure No    Hobby Hazards No    Sleep Concern No    Stress Concern No    Weight Concern No    Special Diet No    Back Care No    Exercise Yes    Bike Helmet No    Seat Belt Yes    Self-Exams Yes   Social History Narrative    Not on file         HPI  61-year-old female with history of rheumatoid arthritis, osteoarthritis, degenerative joint disease, diverticular disease, hypertension, and osteopenia who comes in for evaluation of epigastric abdominal pain. States she intermittently develops pain in the epigastrium and lower chest which is severe at times. She states if she belches it does improve some. She states she has a history of a hiatal hernia. She is present not on any medication for acid reflux. Patient states she also has pain in the left lower quadrant at times. She had an ultrasound of the area done in the past which was unremarkable. Patient states the pain is intermittently severe but always low-grade in the epigastric region. She states that she overeats that makes the pain worse and even causes nausea. He states the symptoms have been present for approximately 1 year. Patient states she does not take any NSAIDs. She takes Tylenol for pain. Patient states she no longer takes her medications for rheumatoid arthritis or NSAIDs because it was causing her kidney problems. Patient states she has had multiple colonoscopies in the past with the last being 2018 no prior EGD. Review of Systems   Constitutional: Negative. HENT: Negative. Eyes: Negative. Respiratory: Negative. Cardiovascular: Negative.     Gastrointestinal: Positive for abdominal pain, heartburn and nausea. Negative for blood in stool, constipation, diarrhea, melena and vomiting. Genitourinary: Negative. Musculoskeletal: Positive for back pain and joint pain. Skin: Negative. Neurological: Negative. Endo/Heme/Allergies: Negative. Psychiatric/Behavioral: Negative. All other systems reviewed and are negative. Visit Vitals  /70 (BP 1 Location: Left arm, BP Patient Position: Sitting)   Pulse 67   Temp 98.2 °F (36.8 °C) (Skin)   Resp 16   Ht 5' 5\" (1.651 m)   Wt 72.8 kg (160 lb 8 oz)   SpO2 97% Comment: on room air   BMI 26.71 kg/m²     Physical Exam  Vitals signs and nursing note reviewed. Constitutional:       Appearance: Normal appearance. She is obese. HENT:      Head: Normocephalic and atraumatic. Nose: Nose normal.      Mouth/Throat:      Mouth: Mucous membranes are moist.      Pharynx: Oropharynx is clear. Eyes:      Conjunctiva/sclera: Conjunctivae normal.      Pupils: Pupils are equal, round, and reactive to light. Neck:      Musculoskeletal: Normal range of motion and neck supple. Cardiovascular:      Rate and Rhythm: Normal rate and regular rhythm. Pulses: Normal pulses. Heart sounds: Normal heart sounds. Pulmonary:      Effort: Pulmonary effort is normal.      Breath sounds: Normal breath sounds. Abdominal:      General: Bowel sounds are normal. There is no distension. Palpations: Abdomen is soft. There is no mass. Tenderness: There is abdominal tenderness. There is no right CVA tenderness, left CVA tenderness, guarding or rebound. Hernia: No hernia is present. Musculoskeletal: Normal range of motion. General: Swelling, tenderness and deformity present. Skin:     General: Skin is warm and dry. Neurological:      General: No focal deficit present. Mental Status: She is alert and oriented to person, place, and time.    Psychiatric:         Mood and Affect: Mood normal. Behavior: Behavior normal.         Thought Content: Thought content normal.         Judgment: Judgment normal.            1. Epigastric pain  Rule out peptic ulcer disease versus gastritis versus gastroesophageal reflux disease. - UPPER GI ENDOSCOPY,DIAGNOSIS; Future    2. Hiatal hernia      3. Seropositive [facility] sites [facility]      4.  Osteoarthritis of both hips, unspecified osteoarthritis type

## 2020-09-11 NOTE — PROGRESS NOTES
EGD IS SCHEDULED FOR 9- AT 9:00AM . NO PA REQUIRED FOR EGD- PER IRAIDA MALCOLM AT [facility]- REF# [id number].

## 2020-09-17 ENCOUNTER — HOSPITAL ENCOUNTER (OUTPATIENT)
Dept: PREADMISSION TESTING | Age: 80
Discharge: HOME | End: 2020-09-17
Payer: MEDICARE

## 2020-09-17 LAB — SARS-COV-2, COV2: NORMAL

## 2020-09-17 PROCEDURE — 87635 SARS-COV-2 COVID-19 AMP PRB: CPT

## 2020-09-19 LAB — SARS-COV-2, COV2NT: NOT DETECTED

## 2020-09-24 ENCOUNTER — ANESTHESIA EVENT (OUTPATIENT)
Dept: ENDOSCOPY | Age: 80
End: 2020-09-24
Payer: MEDICARE

## 2020-09-24 ENCOUNTER — HOSPITAL ENCOUNTER (OUTPATIENT)
Age: 80
Setting detail: OUTPATIENT SURGERY
Discharge: HOME | End: 2020-09-24
Attending: INTERNAL MEDICINE | Admitting: INTERNAL MEDICINE
Payer: MEDICARE

## 2020-09-24 ENCOUNTER — ANESTHESIA (OUTPATIENT)
Dept: ENDOSCOPY | Age: 80
End: 2020-09-24
Payer: MEDICARE

## 2020-09-24 VITALS
HEART RATE: 69 BPM | RESPIRATION RATE: 18 BRPM | TEMPERATURE: 98.2 F | HEIGHT: 63 IN | SYSTOLIC BLOOD PRESSURE: 115 MMHG | BODY MASS INDEX: 28.53 KG/M2 | WEIGHT: 161 LBS | OXYGEN SATURATION: 100 % | DIASTOLIC BLOOD PRESSURE: 77 MMHG

## 2020-09-24 DIAGNOSIS — K25.3 ACUTE GASTRIC ULCER WITHOUT HEMORRHAGE OR PERFORATION: Primary | ICD-10-CM

## 2020-09-24 PROCEDURE — 43239 EGD BIOPSY SINGLE/MULTIPLE: CPT | Performed by: INTERNAL MEDICINE

## 2020-09-24 PROCEDURE — 76040000019: Performed by: INTERNAL MEDICINE

## 2020-09-24 PROCEDURE — 2709999900 HC NON-CHARGEABLE SUPPLY: Performed by: INTERNAL MEDICINE

## 2020-09-24 PROCEDURE — 77030021593 HC FCPS BIOP ENDOSC BSC -A: Performed by: INTERNAL MEDICINE

## 2020-09-24 PROCEDURE — 74011000250 HC RX REV CODE- 250: Performed by: NURSE ANESTHETIST, CERTIFIED REGISTERED

## 2020-09-24 PROCEDURE — 74011250636 HC RX REV CODE- 250/636: Performed by: INTERNAL MEDICINE

## 2020-09-24 PROCEDURE — 74011250636 HC RX REV CODE- 250/636: Performed by: NURSE ANESTHETIST, CERTIFIED REGISTERED

## 2020-09-24 PROCEDURE — 76060000031 HC ANESTHESIA FIRST 0.5 HR: Performed by: INTERNAL MEDICINE

## 2020-09-24 RX ORDER — HYDROMORPHONE HYDROCHLORIDE 1 MG/ML
0.2 INJECTION, SOLUTION INTRAMUSCULAR; INTRAVENOUS; SUBCUTANEOUS AS NEEDED
Status: CANCELLED | OUTPATIENT
Start: 2020-09-24

## 2020-09-24 RX ORDER — GLYCOPYRROLATE 0.2 MG/ML
INJECTION INTRAMUSCULAR; INTRAVENOUS AS NEEDED
Status: DISCONTINUED | OUTPATIENT
Start: 2020-09-24 | End: 2020-09-24 | Stop reason: HOSPADM

## 2020-09-24 RX ORDER — MORPHINE SULFATE 10 MG/ML
2 INJECTION, SOLUTION INTRAMUSCULAR; INTRAVENOUS
Status: CANCELLED | OUTPATIENT
Start: 2020-09-24

## 2020-09-24 RX ORDER — SODIUM CHLORIDE, SODIUM LACTATE, POTASSIUM CHLORIDE, CALCIUM CHLORIDE 600; 310; 30; 20 MG/100ML; MG/100ML; MG/100ML; MG/100ML
INJECTION, SOLUTION INTRAVENOUS
Status: DISCONTINUED | OUTPATIENT
Start: 2020-09-24 | End: 2020-09-24 | Stop reason: HOSPADM

## 2020-09-24 RX ORDER — PANTOPRAZOLE SODIUM 40 MG/1
40 TABLET, DELAYED RELEASE ORAL DAILY
Qty: 30 TAB | Refills: 3 | Status: SHIPPED | OUTPATIENT
Start: 2020-09-24 | End: 2020-12-17

## 2020-09-24 RX ORDER — SODIUM CHLORIDE 0.9 % (FLUSH) 0.9 %
5-40 SYRINGE (ML) INJECTION AS NEEDED
Status: CANCELLED | OUTPATIENT
Start: 2020-09-24

## 2020-09-24 RX ORDER — ALBUTEROL SULFATE 0.83 MG/ML
2.5 SOLUTION RESPIRATORY (INHALATION) AS NEEDED
Status: CANCELLED | OUTPATIENT
Start: 2020-09-24

## 2020-09-24 RX ORDER — SODIUM CHLORIDE 0.9 % (FLUSH) 0.9 %
5-40 SYRINGE (ML) INJECTION EVERY 8 HOURS
Status: CANCELLED | OUTPATIENT
Start: 2020-09-24

## 2020-09-24 RX ORDER — PROPOFOL 10 MG/ML
INJECTION, EMULSION INTRAVENOUS AS NEEDED
Status: DISCONTINUED | OUTPATIENT
Start: 2020-09-24 | End: 2020-09-24 | Stop reason: HOSPADM

## 2020-09-24 RX ORDER — KETAMINE HYDROCHLORIDE 50 MG/ML
INJECTION, SOLUTION INTRAMUSCULAR; INTRAVENOUS AS NEEDED
Status: DISCONTINUED | OUTPATIENT
Start: 2020-09-24 | End: 2020-09-24 | Stop reason: HOSPADM

## 2020-09-24 RX ORDER — FENTANYL CITRATE 50 UG/ML
INJECTION, SOLUTION INTRAMUSCULAR; INTRAVENOUS AS NEEDED
Status: DISCONTINUED | OUTPATIENT
Start: 2020-09-24 | End: 2020-09-24 | Stop reason: HOSPADM

## 2020-09-24 RX ORDER — SODIUM CHLORIDE, SODIUM LACTATE, POTASSIUM CHLORIDE, CALCIUM CHLORIDE 600; 310; 30; 20 MG/100ML; MG/100ML; MG/100ML; MG/100ML
25 INJECTION, SOLUTION INTRAVENOUS CONTINUOUS
Status: DISCONTINUED | OUTPATIENT
Start: 2020-09-24 | End: 2020-09-24

## 2020-09-24 RX ORDER — FENTANYL CITRATE 50 UG/ML
25 INJECTION, SOLUTION INTRAMUSCULAR; INTRAVENOUS
Status: CANCELLED | OUTPATIENT
Start: 2020-09-24

## 2020-09-24 RX ORDER — SODIUM CHLORIDE, SODIUM LACTATE, POTASSIUM CHLORIDE, CALCIUM CHLORIDE 600; 310; 30; 20 MG/100ML; MG/100ML; MG/100ML; MG/100ML
125 INJECTION, SOLUTION INTRAVENOUS CONTINUOUS
Status: CANCELLED | OUTPATIENT
Start: 2020-09-24

## 2020-09-24 RX ORDER — ONDANSETRON 2 MG/ML
4 INJECTION INTRAMUSCULAR; INTRAVENOUS AS NEEDED
Status: CANCELLED | OUTPATIENT
Start: 2020-09-24

## 2020-09-24 RX ADMIN — GLYCOPYRROLATE 0.2 MG: 0.2 INJECTION, SOLUTION INTRAMUSCULAR; INTRAVENOUS at 09:51

## 2020-09-24 RX ADMIN — KETAMINE HYDROCHLORIDE 100 MG: 50 INJECTION INTRAMUSCULAR; INTRAVENOUS at 10:05

## 2020-09-24 RX ADMIN — KETAMINE HYDROCHLORIDE 10 MG: 50 INJECTION INTRAMUSCULAR; INTRAVENOUS at 09:59

## 2020-09-24 RX ADMIN — KETAMINE HYDROCHLORIDE 70 MG: 50 INJECTION INTRAMUSCULAR; INTRAVENOUS at 10:08

## 2020-09-24 RX ADMIN — PROPOFOL 30 MG: 10 INJECTION, EMULSION INTRAVENOUS at 10:06

## 2020-09-24 RX ADMIN — SODIUM CHLORIDE, POTASSIUM CHLORIDE, SODIUM LACTATE AND CALCIUM CHLORIDE: 600; 310; 30; 20 INJECTION, SOLUTION INTRAVENOUS at 09:51

## 2020-09-24 RX ADMIN — KETAMINE HYDROCHLORIDE 20 MG: 50 INJECTION INTRAMUSCULAR; INTRAVENOUS at 10:02

## 2020-09-24 RX ADMIN — SODIUM CHLORIDE, POTASSIUM CHLORIDE, SODIUM LACTATE AND CALCIUM CHLORIDE 25 ML/HR: 600; 310; 30; 20 INJECTION, SOLUTION INTRAVENOUS at 08:40

## 2020-09-24 RX ADMIN — FENTANYL CITRATE 100 MCG: 50 INJECTION, SOLUTION INTRAMUSCULAR; INTRAVENOUS at 09:51

## 2020-09-24 NOTE — INTERVAL H&P NOTE
Update History & Physical 
 
The Patient's History and Physical of September 24, 2020  
 was reviewed with the patient and I examined the patient. There was no change. The surgical site was confirmed by the patient and me. Plan:  The risk, benefits, expected outcome, and alternative to the recommended procedure have been discussed with the patient. Patient understands and wants to proceed with the procedure.  
 
Electronically signed by Sofiya Mckinley MD on 9/24/2020 at 9:35 AM

## 2020-09-24 NOTE — ANESTHESIA PREPROCEDURE EVALUATION
Relevant Problems   No relevant active problems       Anesthetic History   No history of anesthetic complications  Other anesthesia complications          Review of Systems / Medical History  Patient summary reviewed, nursing notes reviewed and pertinent labs reviewed    Pulmonary  Within defined limits                 Neuro/Psych   Within defined limits           Cardiovascular  Within defined limits  Hypertension                   GI/Hepatic/Renal     GERD           Endo/Other        Arthritis     Other Findings            Physical Exam    Airway  Mallampati: II    Neck ROM: normal range of motion        Cardiovascular    Rhythm: regular  Rate: normal         Dental  No notable dental hx       Pulmonary  Breath sounds clear to auscultation               Abdominal  Abdominal exam normal       Other Findings            Anesthetic Plan    ASA: 3  Anesthesia type: total IV anesthesia            Anesthetic plan and risks discussed with: Patient

## 2020-09-24 NOTE — DISCHARGE INSTRUCTIONS

## 2020-09-24 NOTE — ANESTHESIA POSTPROCEDURE EVALUATION
Procedure(s):  ESOPHAGOGASTRODUODENOSCOPY (EGD). total IV anesthesia    Anesthesia Post Evaluation      Multimodal analgesia: multimodal analgesia not used between 6 hours prior to anesthesia start to PACU discharge  Patient location during evaluation: PACU  Patient participation: complete - patient participated  Level of consciousness: sleepy but conscious  Pain score: 0  Pain management: adequate  Airway patency: patent  Anesthetic complications: no  Cardiovascular status: acceptable  Respiratory status: acceptable  Hydration status: acceptable  Post anesthesia nausea and vomiting:  none  Final Post Anesthesia Temperature Assessment:  Normothermia (36.0-37.5 degrees C)  Blood pressure 110/69, pulse 80, temperature 36.8 °C (98.2 °F), resp. rate 16, height 5' 3\" (1.6 m), weight 73 kg (161 lb), SpO2 100 %, unknown if currently breastfeeding.       INITIAL Post-op Vital signs:   Vitals Value Taken Time   /69 9/24/2020 10:20 AM   Temp 36.8 °C (98.2 °F) 9/24/2020 10:20 AM   Pulse 80 9/24/2020 10:20 AM   Resp 16 9/24/2020 10:20 AM   SpO2 100 % 9/24/2020 10:20 AM

## 2020-09-24 NOTE — OP NOTES
EGD Procedure Note        Patient: Karla Dominique MRN: [id number]  SSN: [id number]    YOB: 1940  Age: 70 y.o. Sex: female        Date/Time:  9/24/2020 10:15 AM         IMPRESSION:       1. Prepyloric ulcer  2. Antral gastritis  3. Hiatal hernia (large)  4. Distal esophagitis (grade 2)       RECOMMENDATIONS:    1. Check biopsy results. 2. Daily PPI therapy. 3. Avoid use of NSAIDs. 4. She may need repeat EGD in 8 weeks to check for cure if symptoms persist and or biopsy results do not explain a cause for the ulcer. Procedure: Esophagogastroduodenoscopy with biopsies    Indication: Epigastric abdominal pain    Endoscopist:  Lilia Waters MD    Referring Provider:   Prashanth Keller MD    History: The history and physical exam were reviewed and updated. Endoscope: GIF H190 endoscope    Extent of Exam: second portion of the duodenum    ASA: ASA 3 - Patient with moderate systemic disease with functional limitations    Anethesia/Sedation:  TIVA    Description of the procedure: The procedure was discussed with the patient including risks, benefits, alternatives including risks of iv sedation, bleeding, perforation and aspiration. A safety timeout was performed. The patient was placed in the left lateral decubitus position. A bite block was placed. The patient was using standard protocol. The patients vital signs were monitored at all times including heart rate/rhythm, blood pressure and oxygen saturation. The endoscope was then passed under direct visualization to the second portion of the duodenum. The endoscope was then slowly withdrawn while visualizing the mucosa. In the stomach a retroflexion was performed and gastric fundus and cardia visualized. The patient was then transferred to recovery in stable condition. Findings:   Esophagus: The esophageal mucosa was normal with no ulceration, mass or stricture.   There was increased inflammation of the distal esophagus consistent with a grade 2 esophagitis. Stomach: There was increased inflammation of the distal esophagus with focal areas of intense inflammation. We saw an ulcer in the prepyloric region the 10 o'clock position with a crater of 1 cm in size with raised inflamed borders. 2 biopsies were taken at the ulcer margin. .   Duodenum: The duodenum mucosa was normal with no ulceration, mass, stricture and no evidence of villous atrophy. Therapies: None done during the procedure    Specimens:   ID Type Source Tests Collected by Time Destination   1 : gastric antrum mucosa Preservative Stomach, Antrum  Gregory Jimenez MD 9/24/2020 1008 Pathology               EBL: Minimal    Complications:   None; patient tolerated the procedure well.      Implants: None    Discharge disposition:  Out of the recovery area when discharge criteria met         Edmund Omalley MD  September 24, 2020  10:15 AM

## 2020-09-29 ENCOUNTER — TELEPHONE (OUTPATIENT)
Dept: GASTROENTEROLOGY | Age: 80
End: 2020-09-29

## 2020-09-29 NOTE — TELEPHONE ENCOUNTER
----- Message from Elian Yee MD sent at 9/29/2020 10:07 AM EDT -----  Tell patient that the biopsies taken and her stomach showed gastritis/inflammation. No infection was noted. He should continue present medications. Please give her a follow-up visit if remains symptomatic.

## 2020-09-29 NOTE — TELEPHONE ENCOUNTER
I called patient, explained her test showed she has gastritis in her stomach, no infection. Continue medication, watch diet, and call if need follow up. She said she was doing good now.

## 2020-10-04 ENCOUNTER — TELEPHONE (OUTPATIENT)
Dept: GASTROENTEROLOGY | Age: 80
End: 2020-10-04

## 2020-10-04 NOTE — LETTER
10/26/2020 4:50 PM 
 
Ms. Michelle Rivera [address] [facility] [address] Dear Elzbieta Arciniega, We have made several attempts to reach you by phone to discuss your test results. Sorry we were unable to reach you. Please call our office at [phone] so we may discuss your test results with you. Sincerely, More Yin MD

## 2020-11-02 ENCOUNTER — TELEPHONE (OUTPATIENT)
Dept: GASTROENTEROLOGY | Age: 80
End: 2020-11-02

## 2020-11-02 NOTE — TELEPHONE ENCOUNTER
Patient returned my call, I explained that her test showed she had gastritis, no infection. Continue her pantoprazole, and avoid fried and highly seasoned food. She said she had been doing that, but still having problems with stomach. Would like follow up appt. [address] will call her to schedule. She said ok.

## 2020-12-17 DIAGNOSIS — K25.3 ACUTE GASTRIC ULCER WITHOUT HEMORRHAGE OR PERFORATION: ICD-10-CM

## 2020-12-17 RX ORDER — PANTOPRAZOLE SODIUM 40 MG/1
TABLET, DELAYED RELEASE ORAL
Qty: 90 TAB | Refills: 3 | Status: SHIPPED | OUTPATIENT
Start: 2020-12-17 | End: 2021-12-27

## 2020-12-23 ENCOUNTER — OFFICE VISIT (OUTPATIENT)
Dept: GASTROENTEROLOGY | Age: 80
End: 2020-12-23
Payer: MEDICARE

## 2020-12-23 VITALS
TEMPERATURE: 98 F | HEART RATE: 78 BPM | RESPIRATION RATE: 16 BRPM | BODY MASS INDEX: 29.77 KG/M2 | HEIGHT: 63 IN | OXYGEN SATURATION: 98 % | SYSTOLIC BLOOD PRESSURE: 110 MMHG | WEIGHT: 168 LBS | DIASTOLIC BLOOD PRESSURE: 70 MMHG

## 2020-12-23 DIAGNOSIS — K25.3 ACUTE PREPYLORIC ULCER: Primary | ICD-10-CM

## 2020-12-23 DIAGNOSIS — K44.9 HIATAL HERNIA: ICD-10-CM

## 2020-12-23 DIAGNOSIS — K29.50 ANTRAL GASTRITIS: ICD-10-CM

## 2020-12-23 DIAGNOSIS — K21.00 GASTROESOPHAGEAL REFLUX DISEASE WITH ESOPHAGITIS WITHOUT HEMORRHAGE: ICD-10-CM

## 2020-12-23 PROCEDURE — G8536 NO DOC ELDER MAL SCRN: HCPCS | Performed by: INTERNAL MEDICINE

## 2020-12-23 PROCEDURE — G8432 DEP SCR NOT DOC, RNG: HCPCS | Performed by: INTERNAL MEDICINE

## 2020-12-23 PROCEDURE — G8399 PT W/DXA RESULTS DOCUMENT: HCPCS | Performed by: INTERNAL MEDICINE

## 2020-12-23 PROCEDURE — G8427 DOCREV CUR MEDS BY ELIG CLIN: HCPCS | Performed by: INTERNAL MEDICINE

## 2020-12-23 PROCEDURE — 99214 OFFICE O/P EST MOD 30 MIN: CPT | Performed by: INTERNAL MEDICINE

## 2020-12-23 PROCEDURE — 1090F PRES/ABSN URINE INCON ASSESS: CPT | Performed by: INTERNAL MEDICINE

## 2020-12-23 PROCEDURE — G8419 CALC BMI OUT NRM PARAM NOF/U: HCPCS | Performed by: INTERNAL MEDICINE

## 2020-12-23 PROCEDURE — 1101F PT FALLS ASSESS-DOCD LE1/YR: CPT | Performed by: INTERNAL MEDICINE

## 2020-12-23 RX ORDER — TRAMADOL HYDROCHLORIDE 50 MG/1
50 TABLET ORAL
COMMUNITY

## 2020-12-23 RX ORDER — LORATADINE 10 MG/1
TABLET ORAL
COMMUNITY
Start: 2020-11-19

## 2020-12-23 RX ORDER — PREDNISONE 5 MG/1
TABLET ORAL
COMMUNITY
Start: 2020-12-21 | End: 2021-06-24 | Stop reason: ALTCHOICE

## 2020-12-23 RX ORDER — NAPROXEN 500 MG/1
TABLET ORAL
COMMUNITY
Start: 2020-11-18

## 2020-12-23 NOTE — PROGRESS NOTES
1. Have you been to the ER, urgent care clinic since your last visit? Hospitalized since your last visit? NO    2. Have you seen or consulted any other health care providers outside of the [address] [place] [address] since your last visit? Include any pap smears or colon screening.  NO.

## 2020-12-23 NOTE — PROGRESS NOTES
Christine Oh is a 68 y.o. female who presents today for the following:  Chief Complaint   Patient presents with    GI Problem     Follow up antral gastritis, prepyloric ulcer, HH, esophagitis, had EGD on 9-         No Known Allergies    Current Outpatient Medications   Medication Sig    predniSONE (DELTASONE) 5 mg tablet FOR 7 DAYS    pantoprazole (PROTONIX) 40 mg tablet TAKE 1 TABLET BY MOUTH DAILY    amLODIPine (NORVASC) 10 mg tablet TAKE 1 TABLET BY MOUTH EVERY DAY    cholecalciferol (VITAMIN D3) (2,000 UNITS /50 MCG) cap capsule Take  by mouth two (2) times a day.  multivitamin (ONE A DAY) tablet Take 1 Tab by mouth daily.  glucosamine-chondroitin (ARTHX) 500-400 mg cap Take 1 Cap by mouth daily.  vitamin E (AQUA GEMS) 400 unit capsule Take 400 Units by mouth daily.  ascorbic acid, vitamin C, (VITAMIN C) 500 mg tablet Take 500 mg by mouth daily. Takes 1 tab daily.  loratadine (CLARITIN) 10 mg tablet AS NEEDED    naproxen (NAPROSYN) 500 mg tablet ONLY WHEN HURTING BAD    traMADoL (ULTRAM) 50 mg tablet Take 50 mg by mouth every six (6) hours as needed for Pain. PATIENT SAID THIS MADE HER SICK, SHAKING ALL OVER,. SHE STOPPED IT     No current facility-administered medications for this visit.         Past Medical History:   Diagnosis Date    Abdominal pain 9/10/2020    Antral gastritis     Arthritis     since age 14    Carpal tunnel syndrome     Diverticulosis 9/10/2020    DJD (degenerative joint disease)     hip/spine lumbar spine with spinal stenosis     DJD (degenerative joint disease) of knees 4/9/2013    Ortho, [facility] Dr Mendez Vaca Esophagitis     GERD (gastroesophageal reflux disease)     Hiatal hernia     HTN (hypertension) 9/10/2020    ILD (interstitial lung disease) ([facility] [address] 6/14/2017    Osteopenia of multiple sites     Hip; 5/21/15     Post-menopausal     Prepyloric ulcer     Primary osteoarthritis of both hands 5/27/2016    Primary osteoarthritis of both knees 2016    Pulmonary nodules 6/15/2016    Right leg pain 9/10/2020    Seropositive rheumatoid arthritis ([facility] [address] 2016    Wheezing 9/10/2020       Past Surgical History:   Procedure Laterality Date    COLONOSCOPY      DR Rehan Schreiber    COLONOSCOPY      DR Selwyn Angelucci - [address] ENDOSCOPY VISIT-OUTPATIENT  2020    ENDOSCOPY, COLON, DIAGNOSTIC      -repeat in 5 years    HX CATARACT REMOVAL Right 10/2015    HX HYSTERECTOMY      w/partial ovariectomy r/t endometriosis by Dr. Roya Staples    [address]  2003    bone stimulator Dr[address] Nisa King at [address]  10/2004       Family History   Problem Relation Age of Onset    Heart Disease Mother 72        PTCA    Elevated Lipids Sister     Hypertension Brother     Arthritis-osteo Brother     Hypertension Maternal Aunt     Stroke Maternal Aunt     Stroke Maternal Uncle     Hypertension Maternal Uncle     Arthritis-osteo Sister    [place] Sister     Thyroid Disease Sister     Arthritis-osteo Father         knees       Social History     Socioeconomic History    Marital status:      Spouse name: Not on file    Number of children: Not on file    Years of education: Not on file    Highest education level: Not on file   Occupational History    Not on file   Social Needs    Financial resource strain: Not on file    Food insecurity     Worry: Not on file     Inability: Not on file    Transportation needs     Medical: Not on file     Non-medical: Not on file   Tobacco Use    Smoking status: Former Smoker     Packs/day: 0.10     Types: Cigarettes     Quit date: 2014     Years since quittin.7    Smokeless tobacco: Never Used    Tobacco comment: 40 years of .5 pack/day prior to quit 2012, relapsed.    Substance and Sexual Activity    Alcohol use: No    Drug use: No    Sexual activity: Yes     Partners: Male   Lifestyle    Physical activity     Days per week: Not on file     Minutes per session: Not on file    Stress: Not on file   Relationships    Social connections     Talks on phone: Not on file     Gets together: Not on file     Attends [employer] service: Not on file     Active member of club or organization: Not on file     Attends meetings of clubs or organizations: Not on file     Relationship status: Not on file    Intimate partner violence     Fear of current or ex partner: Not on file     Emotionally abused: Not on file     Physically abused: Not on file     Forced sexual activity: Not on file   Other Topics Concern     Service No    Blood Transfusions No    Caffeine Concern No    Occupational Exposure No    Hobby Hazards No    Sleep Concern No    Stress Concern No    Weight Concern No    Special Diet No    Back Care No    Exercise Yes    Bike Helmet No    Seat Belt Yes    Self-Exams Yes   Social History Narrative    Not on file         HPI  82-year-old female with history of rheumatoid arthritis, osteoarthritis, degenerative joint disease, diverticular disease, hypertension, and osteopenia who comes in for follow-up visit after an EGD 9/24/2020. The test showed a prepyloric ulcer, antral gastritis, a large hiatal hernia, and distal esophagitis (grade 2). Patient states she remains on pantoprazole 40 mg daily. She states when she bends over she becomes short of breath. Also has pain in her flanks it is to her back which has been present for a long time. States she is eating okay but she eats only a small amount at the time. States that she overeats she becomes short of breath. Review of Systems   Constitutional: Negative. HENT: Negative. Negative for nosebleeds. Eyes: Negative. Respiratory: Negative. Cardiovascular: Negative. Gastrointestinal: Positive for abdominal pain and heartburn. Negative for blood in stool, constipation, diarrhea, melena, nausea and vomiting. Genitourinary: Negative. Musculoskeletal: Negative. Skin: Negative. Neurological: Negative. Endo/Heme/Allergies: Negative. Psychiatric/Behavioral: Negative. All other systems reviewed and are negative. Visit Vitals  /70 (BP 1 Location: Left arm, BP Patient Position: Sitting)   Pulse 78   Temp 98 °F (36.7 °C) (Skin)   Resp 16   Ht 5' 3\" (1.6 m)   Wt 76.2 kg (168 lb)   SpO2 98% Comment: room air   BMI 29.76 kg/m²     Physical Exam  Vitals signs and nursing note reviewed. Constitutional:       General: She is not in acute distress. Appearance: Normal appearance. She is obese. She is not ill-appearing. HENT:      Head: Normocephalic and atraumatic. Nose: Nose normal.      Mouth/Throat:      Mouth: Mucous membranes are moist.      Pharynx: Oropharynx is clear. Eyes:      General: No scleral icterus. Conjunctiva/sclera: Conjunctivae normal.      Pupils: Pupils are equal, round, and reactive to light. Neck:      Musculoskeletal: Normal range of motion and neck supple. Cardiovascular:      Rate and Rhythm: Normal rate and regular rhythm. Pulses: Normal pulses. Heart sounds: Normal heart sounds. Pulmonary:      Effort: Pulmonary effort is normal.      Breath sounds: Normal breath sounds. Abdominal:      General: Bowel sounds are normal. There is no distension. Palpations: Abdomen is soft. There is no mass. Tenderness: There is abdominal tenderness. There is guarding. There is no right CVA tenderness, left CVA tenderness or rebound. Hernia: No hernia is present. Musculoskeletal: Normal range of motion. Skin:     General: Skin is warm and dry. Coloration: Skin is not jaundiced. Neurological:      General: No focal deficit present. Mental Status: She is alert and oriented to person, place, and time. Psychiatric:         Mood and Affect: Mood normal.         Behavior: Behavior normal.         Thought Content:  Thought content normal.         Judgment: Judgment normal.            1. Acute prepyloric ulcer  Continue the pantoprazole 40 mg daily. Patient may need a repeat EGD especially if symptoms persist.  2. Antral gastritis      3. Gastroesophageal reflux disease with esophagitis without hemorrhage  Continue the pantoprazole 40 mg daily    4.  Hiatal hernia

## 2021-04-19 ENCOUNTER — OFFICE VISIT (OUTPATIENT)
Dept: GASTROENTEROLOGY | Age: 81
End: 2021-04-19
Payer: MEDICARE

## 2021-04-19 VITALS
DIASTOLIC BLOOD PRESSURE: 60 MMHG | RESPIRATION RATE: 16 BRPM | TEMPERATURE: 97.1 F | HEIGHT: 63 IN | SYSTOLIC BLOOD PRESSURE: 110 MMHG | HEART RATE: 70 BPM | BODY MASS INDEX: 29.77 KG/M2 | WEIGHT: 168 LBS | OXYGEN SATURATION: 99 %

## 2021-04-19 DIAGNOSIS — R14.0 BLOATING: ICD-10-CM

## 2021-04-19 DIAGNOSIS — K25.3 ACUTE PREPYLORIC ULCER: Primary | ICD-10-CM

## 2021-04-19 DIAGNOSIS — M16.0 OSTEOARTHRITIS OF BOTH HIPS, UNSPECIFIED OSTEOARTHRITIS TYPE: ICD-10-CM

## 2021-04-19 DIAGNOSIS — R10.32 LLQ PAIN: ICD-10-CM

## 2021-04-19 DIAGNOSIS — K29.50 ANTRAL GASTRITIS: ICD-10-CM

## 2021-04-19 DIAGNOSIS — K57.90 DIVERTICULOSIS: ICD-10-CM

## 2021-04-19 PROCEDURE — G8754 DIAS BP LESS 90: HCPCS | Performed by: INTERNAL MEDICINE

## 2021-04-19 PROCEDURE — G8752 SYS BP LESS 140: HCPCS | Performed by: INTERNAL MEDICINE

## 2021-04-19 PROCEDURE — G8419 CALC BMI OUT NRM PARAM NOF/U: HCPCS | Performed by: INTERNAL MEDICINE

## 2021-04-19 PROCEDURE — G8510 SCR DEP NEG, NO PLAN REQD: HCPCS | Performed by: INTERNAL MEDICINE

## 2021-04-19 PROCEDURE — 1090F PRES/ABSN URINE INCON ASSESS: CPT | Performed by: INTERNAL MEDICINE

## 2021-04-19 PROCEDURE — G8536 NO DOC ELDER MAL SCRN: HCPCS | Performed by: INTERNAL MEDICINE

## 2021-04-19 PROCEDURE — G8427 DOCREV CUR MEDS BY ELIG CLIN: HCPCS | Performed by: INTERNAL MEDICINE

## 2021-04-19 PROCEDURE — G8399 PT W/DXA RESULTS DOCUMENT: HCPCS | Performed by: INTERNAL MEDICINE

## 2021-04-19 PROCEDURE — 1101F PT FALLS ASSESS-DOCD LE1/YR: CPT | Performed by: INTERNAL MEDICINE

## 2021-04-19 PROCEDURE — 99214 OFFICE O/P EST MOD 30 MIN: CPT | Performed by: INTERNAL MEDICINE

## 2021-04-19 NOTE — PROGRESS NOTES
1. Have you been to the ER, urgent care clinic since your last visit? Hospitalized since your last visit? NO    2. Have you seen or consulted any other health care providers outside of the [address] [place] [address] since your last visit? Include any pap smears or colon screening. NO. Visit Vitals  /60 (BP 1 Location: Left upper arm, BP Patient Position: Sitting, BP Cuff Size: Large adult)   Pulse 70   Temp 97.1 °F (36.2 °C) (Skin)   Resp 16   Ht 5' 3\" (1.6 m)   Wt 76.2 kg (168 lb)   SpO2 99% Comment: room air   BMI 29.76 kg/m²     Chief Complaint   Patient presents with    Follow-up     antral gastritis    Ulcer     prepyloric    GERD    Abdominal Pain     LLQ PAIN GOES AND COMES     .

## 2021-04-19 NOTE — PROGRESS NOTES
Maribel Gregorio is a 68 y.o. female who presents today for the following:  Chief Complaint   Patient presents with    Follow-up     antral gastritis    Ulcer     prepyloric    GERD    Abdominal Pain     LLQ PAIN GOES AND COMES    Bloated         Allergies   Allergen Reactions    Tramadol Nausea and Vomiting     MADE HER REAL SICK. Current Outpatient Medications   Medication Sig    loratadine (CLARITIN) 10 mg tablet AS NEEDED    naproxen (NAPROSYN) 500 mg tablet ONLY WHEN HURTING BAD    pantoprazole (PROTONIX) 40 mg tablet TAKE 1 TABLET BY MOUTH DAILY    amLODIPine (NORVASC) 10 mg tablet TAKE 1 TABLET BY MOUTH EVERY DAY    cholecalciferol (VITAMIN D3) (2,000 UNITS /50 MCG) cap capsule Take  by mouth two (2) times a day.  multivitamin (ONE A DAY) tablet Take 1 Tab by mouth daily.  glucosamine-chondroitin (ARTHX) 500-400 mg cap Take 1 Cap by mouth daily. OUT X 1 MONTH    vitamin E (AQUA GEMS) 400 unit capsule Take 400 Units by mouth daily.  ascorbic acid, vitamin C, (VITAMIN C) 500 mg tablet Take 500 mg by mouth daily. Takes 1 tab daily.  predniSONE (DELTASONE) 5 mg tablet FOR 7 DAYS    traMADoL (ULTRAM) 50 mg tablet Take 50 mg by mouth every six (6) hours as needed for Pain. PATIENT SAID THIS MADE HER SICK, SHAKING ALL OVER,. SHE STOPPED IT     No current facility-administered medications for this visit.         Past Medical History:   Diagnosis Date    Abdominal pain 9/10/2020    Antral gastritis     Arthritis     since age 14    Carpal tunnel syndrome     Diverticulosis 9/10/2020    DJD (degenerative joint disease)     hip/spine lumbar spine with spinal stenosis     DJD (degenerative joint disease) of knees 4/9/2013    Ortho, [facility] Dr Rakan Johnson Esophagitis     GERD (gastroesophageal reflux disease)     Hiatal hernia     HTN (hypertension) 9/10/2020    ILD (interstitial lung disease) ([facility] [address] 6/14/2017    Osteopenia of multiple sites     Hip; 5/21/15     Post-menopausal     Prepyloric ulcer     Primary osteoarthritis of both hands 2016    Primary osteoarthritis of both knees 2016    Pulmonary nodules 6/15/2016    Right leg pain 9/10/2020    Seropositive rheumatoid arthritis ([address] 2016    Wheezing 9/10/2020       Past Surgical History:   Procedure Laterality Date    COLONOSCOPY      DR Jeremy Lovell    COLONOSCOPY      [address] Jasen Kellogg - [address] ENDOSCOPY VISIT-OUTPATIENT  2020    ENDOSCOPY, COLON, DIAGNOSTIC      -repeat in 5 years    HX CATARACT REMOVAL Right 10/2015    HX HYSTERECTOMY  's    w/partial ovariectomy r/t endometriosis by Dr. [address]  2003    bone stimulator Dr. Oksana Pearl at [address]  10/2004       Family History   Problem Relation Age of Onset    Heart Disease Mother 72        PTCA    Elevated Lipids Sister     Hypertension Brother     Arthritis-osteo Brother     Hypertension Maternal Aunt     Stroke Maternal Aunt     Stroke Maternal Uncle     Hypertension Maternal Uncle     Arthritis-osteo Sister     Arthritis-osteo Sister     Thyroid Disease Sister     Arthritis-osteo Father         knees       Social History     Socioeconomic History    Marital status:      Spouse name: Not on file    Number of children: Not on file    Years of education: Not on file    Highest education level: Not on file   Occupational History    Not on file   Social Needs    Financial resource strain: Not on file    Food insecurity     Worry: Not on file     Inability: Not on file    Transportation needs     Medical: Not on file     Non-medical: Not on file   Tobacco Use    Smoking status: Former Smoker     Packs/day: 0.10     Types: Cigarettes     Quit date: 2014     Years since quittin.0    Smokeless tobacco: Never Used    Tobacco comment: 40 years of .5 pack/day prior to quit 2012, relapsed.    Substance and Sexual Activity    Alcohol use: No    Drug use: No    Sexual activity: Yes     Partners: Male   Lifestyle    Physical activity     Days per week: Not on file     Minutes per session: Not on file    Stress: Not on file   Relationships    Social connections     Talks on phone: Not on file     Gets together: Not on file     Attends [employer] service: Not on file     Active member of club or organization: Not on file     Attends meetings of clubs or organizations: Not on file     Relationship status: Not on file    Intimate partner violence     Fear of current or ex partner: Not on file     Emotionally abused: Not on file     Physically abused: Not on file     Forced sexual activity: Not on file   Other Topics Concern     Service No    Blood Transfusions No    Caffeine Concern No    Occupational Exposure No    Hobby Hazards No    Sleep Concern No    Stress Concern No    Weight Concern No    Special Diet No    Back Care No    Exercise Yes    Bike Helmet No    Seat Belt Yes    Self-Exams Yes   Social History Narrative    Not on file         HPI  29-year-old female with history of rheumatoid arthritis, osteoarthritis, degenerative joint disease, diverticular disease, hypertension, osteopenia, prepyloric ulcer, gastroesophageal reflux disease, and a hiatal hernia who comes in for follow-up visit. Patient states she is doing fair. She has a lot of bloating. She eats small meals because she gets full fast.  She drinks a lot of water with her meals. Her weight has remained stable. No nausea vomiting. She has pain in the left lower quadrant intermittently and had an ultrasound done by her PCP which she states was okay. She continues to have a lot of pain in her joints and particularly in her knees hips shoulders and hands. Patient states she has a strong family history of rheumatoid arthritis and other types of arthritis in numerous members of her family. Review of Systems   Constitutional: Negative. HENT: Negative. Negative for nosebleeds. Eyes: Negative. Respiratory: Negative. Cardiovascular: Negative. Gastrointestinal: Positive for abdominal pain. Negative for blood in stool, constipation, diarrhea, heartburn, nausea and vomiting. Genitourinary: Negative. Musculoskeletal: Positive for joint pain. Skin: Negative. Neurological: Negative. Endo/Heme/Allergies: Negative. Psychiatric/Behavioral: Negative. All other systems reviewed and are negative. Visit Vitals  /60 (BP 1 Location: Left upper arm, BP Patient Position: Sitting, BP Cuff Size: Large adult)   Pulse 70   Temp 97.1 °F (36.2 °C) (Skin)   Resp 16   Ht 5' 3\" (1.6 m)   Wt 76.2 kg (168 lb)   SpO2 99% Comment: room air   BMI 29.76 kg/m²     Physical Exam  Vitals signs and nursing note reviewed. Constitutional:       Appearance: Normal appearance. HENT:      Head: Normocephalic and atraumatic. Nose: Nose normal.      Mouth/Throat:      Mouth: Mucous membranes are moist.      Pharynx: Oropharynx is clear. Eyes:      General: No scleral icterus. Conjunctiva/sclera: Conjunctivae normal.      Pupils: Pupils are equal, round, and reactive to light. Neck:      Musculoskeletal: Normal range of motion and neck supple. Cardiovascular:      Rate and Rhythm: Normal rate and regular rhythm. Pulses: Normal pulses. Heart sounds: Normal heart sounds. Pulmonary:      Effort: Pulmonary effort is normal.      Breath sounds: Normal breath sounds. Abdominal:      General: Bowel sounds are normal. There is no distension. Palpations: Abdomen is soft. There is no mass. Tenderness: There is no abdominal tenderness. There is no right CVA tenderness, left CVA tenderness, guarding or rebound. Hernia: No hernia is present. Musculoskeletal: Normal range of motion. General: Swelling and tenderness present. Skin:     General: Skin is warm and dry.       Coloration: Skin is not jaundiced. Neurological:      General: No focal deficit present. Mental Status: She is alert and oriented to person, place, and time. Psychiatric:         Mood and Affect: Mood normal.         Behavior: Behavior normal.         Thought Content: Thought content normal.         Judgment: Judgment normal.            1. Acute prepyloric ulcer  Continue the pantoprazole 40 mg daily    2. Diverticulosis  The underlying cause of this intermittent left lower quadrant pain may very well be diverticular disease. Patient presently has no tenderness so will not initiate treatment at this time. 3. Antral gastritis      4. Osteoarthritis of both hips, unspecified osteoarthritis type      5. Bloating      6. LLQ pain  Probably secondary to intermittent bouts of diverticulitis. Patient is presently asymptomatic.

## 2021-06-22 PROBLEM — D64.9 ANEMIA: Status: ACTIVE | Noted: 2021-06-22

## 2021-06-24 ENCOUNTER — OFFICE VISIT (OUTPATIENT)
Dept: GASTROENTEROLOGY | Age: 81
End: 2021-06-24
Payer: MEDICARE

## 2021-06-24 VITALS
OXYGEN SATURATION: 97 % | SYSTOLIC BLOOD PRESSURE: 110 MMHG | HEART RATE: 73 BPM | HEIGHT: 63 IN | WEIGHT: 162.8 LBS | TEMPERATURE: 97.9 F | RESPIRATION RATE: 14 BRPM | DIASTOLIC BLOOD PRESSURE: 60 MMHG | BODY MASS INDEX: 28.84 KG/M2

## 2021-06-24 DIAGNOSIS — D50.0 IRON DEFICIENCY ANEMIA DUE TO CHRONIC BLOOD LOSS: ICD-10-CM

## 2021-06-24 DIAGNOSIS — K62.5 GASTROINTESTINAL HEMORRHAGE ASSOCIATED WITH ANORECTAL SOURCE: Primary | ICD-10-CM

## 2021-06-24 DIAGNOSIS — M16.0 OSTEOARTHRITIS OF BOTH HIPS, UNSPECIFIED OSTEOARTHRITIS TYPE: ICD-10-CM

## 2021-06-24 DIAGNOSIS — R10.13 EPIGASTRIC PAIN: ICD-10-CM

## 2021-06-24 DIAGNOSIS — Z79.60 LONG-TERM USE OF IMMUNOSUPPRESSANT MEDICATION: ICD-10-CM

## 2021-06-24 DIAGNOSIS — K44.9 HIATAL HERNIA: ICD-10-CM

## 2021-06-24 DIAGNOSIS — M05.79 SEROPOSITIVE RHEUMATOID ARTHRITIS OF MULTIPLE SITES (HCC): ICD-10-CM

## 2021-06-24 PROBLEM — Z92.89 HISTORY OF RECENT BLOOD TRANSFUSION: Status: ACTIVE | Noted: 2021-06-24

## 2021-06-24 PROBLEM — K92.2 GI BLEED: Status: ACTIVE | Noted: 2021-06-24

## 2021-06-24 PROCEDURE — G8536 NO DOC ELDER MAL SCRN: HCPCS | Performed by: INTERNAL MEDICINE

## 2021-06-24 PROCEDURE — G8752 SYS BP LESS 140: HCPCS | Performed by: INTERNAL MEDICINE

## 2021-06-24 PROCEDURE — G8427 DOCREV CUR MEDS BY ELIG CLIN: HCPCS | Performed by: INTERNAL MEDICINE

## 2021-06-24 PROCEDURE — 99214 OFFICE O/P EST MOD 30 MIN: CPT | Performed by: INTERNAL MEDICINE

## 2021-06-24 PROCEDURE — G8399 PT W/DXA RESULTS DOCUMENT: HCPCS | Performed by: INTERNAL MEDICINE

## 2021-06-24 PROCEDURE — 1101F PT FALLS ASSESS-DOCD LE1/YR: CPT | Performed by: INTERNAL MEDICINE

## 2021-06-24 PROCEDURE — G8754 DIAS BP LESS 90: HCPCS | Performed by: INTERNAL MEDICINE

## 2021-06-24 PROCEDURE — G8419 CALC BMI OUT NRM PARAM NOF/U: HCPCS | Performed by: INTERNAL MEDICINE

## 2021-06-24 PROCEDURE — G8510 SCR DEP NEG, NO PLAN REQD: HCPCS | Performed by: INTERNAL MEDICINE

## 2021-06-24 PROCEDURE — 1090F PRES/ABSN URINE INCON ASSESS: CPT | Performed by: INTERNAL MEDICINE

## 2021-06-24 RX ORDER — GABAPENTIN 100 MG/1
100 CAPSULE ORAL 3 TIMES DAILY
COMMUNITY

## 2021-06-24 NOTE — H&P (VIEW-ONLY)
Sandy Mcdaniel is a 68 y.o. female who presents today for the following:  Chief Complaint   Patient presents with    Anemia     Referred by Dr Jude Bean for  HGB 8.5.  on 6-   HAD TO GET BLOOD RECENTYLY AT [facility]. Allergies   Allergen Reactions    Tramadol Nausea and Vomiting     MADE HER REAL SICK. Current Outpatient Medications   Medication Sig    gabapentin (NEURONTIN) 100 mg capsule Take 100 mg by mouth three (3) times daily.  pantoprazole (PROTONIX) 40 mg tablet TAKE 1 TABLET BY MOUTH DAILY    amLODIPine (NORVASC) 10 mg tablet TAKE 1 TABLET BY MOUTH EVERY DAY    cholecalciferol (VITAMIN D3) (2,000 UNITS /50 MCG) cap capsule Take  by mouth two (2) times a day.  multivitamin (ONE A DAY) tablet Take 1 Tab by mouth daily.  glucosamine-chondroitin (ARTHX) 500-400 mg cap Take 1 Cap by mouth daily. OUT X 1 MONTH    vitamin E (AQUA GEMS) 400 unit capsule Take 400 Units by mouth daily.  ascorbic acid, vitamin C, (VITAMIN C) 500 mg tablet Take 500 mg by mouth daily. Takes 1 tab daily.  loratadine (CLARITIN) 10 mg tablet AS NEEDED    naproxen (NAPROSYN) 500 mg tablet ONLY WHEN HURTING BAD (Patient not taking: Reported on 6/24/2021)    traMADoL (ULTRAM) 50 mg tablet Take 50 mg by mouth every six (6) hours as needed for Pain. PATIENT SAID THIS MADE HER SICK, SHAKING ALL OVER,. SHE STOPPED IT (Patient not taking: Reported on 6/24/2021)     No current facility-administered medications for this visit.        Past Medical History:   Diagnosis Date    Abdominal pain 9/10/2020    Anemia 6/22/2021    HGB 8.5   DR Davis Breen    Antral gastritis     Arthritis     since age 14    Arthritis     Blood loss anemia 6/24/2021    Carpal tunnel syndrome     Diverticulosis 9/10/2020    DJD (degenerative joint disease)     hip/spine lumbar spine with spinal stenosis     DJD (degenerative joint disease) of knees 4/9/2013    Ortho, [facility] Dr Andersen Splinter Esophagitis     GERD (gastroesophageal reflux disease)     Hiatal hernia     HTN (hypertension) 9/10/2020    ILD (interstitial lung disease) ([facility] [address] 2017    Osteopenia of multiple sites     Hip; 5/21/15     Post-menopausal     Prepyloric ulcer     Primary osteoarthritis of both hands 2016    Primary osteoarthritis of both knees 2016    Pulmonary nodules 6/15/2016    Right leg pain 9/10/2020    Seropositive rheumatoid arthritis ([facility] [address] 2016    Wheezing 9/10/2020       Past Surgical History:   Procedure Laterality Date    COLONOSCOPY      DR Lashell Lamb    COLONOSCOPY      DR Diamond Anderson - [address] ENDOSCOPY VISIT-OUTPATIENT  2020    ENDOSCOPY, COLON, DIAGNOSTIC      -repeat in 5 years    HX CATARACT REMOVAL Right 10/2015    HX HYSTERECTOMY  's    w/partial ovariectomy r/t endometriosis by Dr. Hodgson [place]    HX [address]  2003    bone stimulator Dr. Barry Navarro at [address]  10/2004       Family History   Problem Relation Age of Onset    Heart Disease Mother 72        PTCA    Elevated Lipids Sister     Hypertension Brother     Arthritis-osteo Brother     Hypertension Maternal Aunt     Stroke Maternal Aunt     Stroke Maternal Uncle     Hypertension Maternal Uncle     Arthritis-osteo Sister    Shell Shape Sister     Thyroid Disease Sister     Arthritis-osteo Father         knees       Social History     Socioeconomic History    Marital status:      Spouse name: Not on file    Number of children: Not on file    Years of education: Not on file    Highest education level: Not on file   Occupational History    Not on file   Tobacco Use    Smoking status: Former Smoker     Packs/day: 0.10     Types: Cigarettes     Quit date: 2014     Years since quittin.2    Smokeless tobacco: Never Used    Tobacco comment: 40 years of .5 pack/day prior to quit 2012, relapsed.    Vaping Use    Vaping Use: Never used Substance and Sexual Activity    Alcohol use: No    Drug use: No    Sexual activity: Yes     Partners: Male   Other Topics Concern     Service No    Blood Transfusions No    Caffeine Concern No    Occupational Exposure No    Hobby Hazards No    Sleep Concern No    Stress Concern No    Weight Concern No    Special Diet No    Back Care No    Exercise Yes    Bike Helmet No    Seat Belt Yes    Self-Exams Yes   Social History Narrative    Not on file     Social Determinants of Health     Financial Resource Strain:     Difficulty of Paying Living Expenses:    Food Insecurity:     Worried About Running Out of Food in the Last Year:     Ran Out of Food in the Last Year:    Transportation Needs:     Lack of Transportation (Medical):  Lack of Transportation (Non-Medical):    Physical Activity:     Days of Exercise per Week:     Minutes of Exercise per Session:    Stress:     Feeling of Stress :    Social Connections:     Frequency of Communication with Friends and Family:     Frequency of Social Gatherings with Friends and Family:     Attends Jain Services:     Active Member of Clubs or Organizations:     Attends Club or Organization Meetings:     Marital Status:    Intimate Partner Violence:     Fear of Current or Ex-Partner:     Emotionally Abused:     Physically Abused:     Sexually Abused:          HPI  68-year-old female with history of rheumatoid arthritis, osteoarthritis, degenerative joint disease, diverticular disease, hypertension, and osteopiña who comes in for follow-up visit for severe anemia with drop in H&H. The patient states she was found to be severely anemic with a hemoglobin of 6+ and received 2 units of packed RBCs. Her repeat blood count was 8.5. She states she had no gross GI bleeding. She had not noticed any black or tarry stools. She has occasional epigastric abdominal pain. She is eating well with only a fair appetite.   Patient states she has had pain in the left lower quadrant intermittently also. She has bad rheumatoid arthritis with pain primarily in the left leg. She states is the first time she has had a drop in her blood count. Stable weight recently. She has intermittently taken naproxen for the arthritic pains. Her most recent medication is gabapentin. Her last colonoscopy was in 2018 by  [facility] and patient states it was unremarkable. She did have an EGD on 9/24/2020 which did show a prepyloric ulcer, antral gastritis, a large hiatal hernia, and distal esophagitis (grade 2). Review of Systems   Constitutional: Negative. HENT: Positive for nosebleeds. Eyes: Negative. Respiratory: Negative. Cardiovascular: Negative. Gastrointestinal: Positive for abdominal pain, heartburn and melena. Negative for blood in stool, constipation, diarrhea, nausea and vomiting. Genitourinary: Negative. Musculoskeletal: Positive for joint pain. Skin: Negative. Neurological: Negative. Endo/Heme/Allergies: Negative. Psychiatric/Behavioral: Negative. All other systems reviewed and are negative. Visit Vitals  /60 (BP 1 Location: Left upper arm, BP Patient Position: Sitting, BP Cuff Size: Adult)   Pulse 73   Temp 97.9 °F (36.6 °C) (Temporal)   Resp 14   Ht 5' 3\" (1.6 m)   Wt 73.8 kg (162 lb 12.8 oz)   SpO2 97% Comment: room air   BMI 28.84 kg/m²     Physical Exam  Vitals and nursing note reviewed. Constitutional:       Appearance: Normal appearance. She is obese. HENT:      Head: Normocephalic and atraumatic. Nose: Nose normal.      Mouth/Throat:      Mouth: Mucous membranes are moist.      Pharynx: Oropharynx is clear. Eyes:      General: No scleral icterus. Conjunctiva/sclera: Conjunctivae normal.      Pupils: Pupils are equal, round, and reactive to light. Cardiovascular:      Rate and Rhythm: Normal rate and regular rhythm. Pulses: Normal pulses. Heart sounds: Normal heart sounds. Pulmonary:      Effort: Pulmonary effort is normal.      Breath sounds: Normal breath sounds. Abdominal:      General: Bowel sounds are normal. There is no distension. Palpations: Abdomen is soft. There is no mass. Tenderness: There is abdominal tenderness. There is no right CVA tenderness, left CVA tenderness, guarding or rebound. Hernia: No hernia is present. Musculoskeletal:         General: Swelling present. Normal range of motion. Cervical back: Normal range of motion and neck supple. Left lower leg: Edema present. Skin:     General: Skin is warm and dry. Coloration: Skin is not jaundiced. Neurological:      General: No focal deficit present. Mental Status: She is alert and oriented to person, place, and time. Psychiatric:         Mood and Affect: Mood normal.         Behavior: Behavior normal.         Thought Content: Thought content normal.         Judgment: Judgment normal.            1. Gastrointestinal hemorrhage associated with anorectal source  Uncertain of cause but must consider rebleeding from the gastric ulcer she had or possibly NSAID induced gastritis or peptic ulcerations. Patient has continued to have blood loss and must consider a repeat colonoscopy. We will try to obtain copy of previous results done at [facility]/U.  - UPPER GI ENDOSCOPY,DIAGNOSIS; Future    2. Iron deficiency anemia due to chronic blood loss  Secondary to GI blood loss  - UPPER GI ENDOSCOPY,DIAGNOSIS; Future    3. Epigastric pain    - UPPER GI ENDOSCOPY,DIAGNOSIS; Future    4. Seropositive rheumatoid arthritis of multiple sites ([facility] [address]      5. Osteoarthritis of both hips, unspecified osteoarthritis type      6. Hiatal hernia    - UPPER GI ENDOSCOPY,DIAGNOSIS; Future    7.  Long-term use of immunosuppressant medication

## 2021-06-24 NOTE — PROGRESS NOTES
EGD IS SCHEDULED FOR 7-1-2021 AT 9AM.  NO COVID TEST-PATIENT IS FULLY VACCINATED.   PENDING Donovan Tong # A EXPIDITED

## 2021-06-24 NOTE — PROGRESS NOTES
1. Have you been to the ER, urgent care clinic since your last visit? Hospitalized since your last visit? Yes PATIENT FIRST  SAT ARTHRITIS PAIN. LAST WEEK WENT TO HOSPITAL IN [place]  HAD TO GET BLOOD. 2. Have you seen or consulted any other health care providers outside of the [address] [place] [address] since your last visit? Include any pap smears or colon screening.   NO     Chief Complaint   Patient presents with    Anemia     Referred by Dr Gwendolyn Michael for  HGB 8.5.  on 6-     Visit Vitals  /60 (BP 1 Location: Left upper arm, BP Patient Position: Sitting, BP Cuff Size: Adult)   Pulse 73   Temp 97.9 °F (36.6 °C) (Temporal)   Resp 14   Ht 5' 3\" (1.6 m)   Wt 73.8 kg (162 lb 12.8 oz)   SpO2 97% Comment: room air   BMI 28.84 kg/m²

## 2021-06-24 NOTE — PROGRESS NOTES
Dipak Ruth is a 68 y.o. female who presents today for the following:  Chief Complaint   Patient presents with    Anemia     Referred by Dr Austin Beebe for  HGB 8.5.  on 6-   HAD TO GET BLOOD RECENTYLY AT [facility]. Allergies   Allergen Reactions    Tramadol Nausea and Vomiting     MADE HER REAL SICK. Current Outpatient Medications   Medication Sig    gabapentin (NEURONTIN) 100 mg capsule Take 100 mg by mouth three (3) times daily.  pantoprazole (PROTONIX) 40 mg tablet TAKE 1 TABLET BY MOUTH DAILY    amLODIPine (NORVASC) 10 mg tablet TAKE 1 TABLET BY MOUTH EVERY DAY    cholecalciferol (VITAMIN D3) (2,000 UNITS /50 MCG) cap capsule Take  by mouth two (2) times a day.  multivitamin (ONE A DAY) tablet Take 1 Tab by mouth daily.  glucosamine-chondroitin (ARTHX) 500-400 mg cap Take 1 Cap by mouth daily. OUT X 1 MONTH    vitamin E (AQUA GEMS) 400 unit capsule Take 400 Units by mouth daily.  ascorbic acid, vitamin C, (VITAMIN C) 500 mg tablet Take 500 mg by mouth daily. Takes 1 tab daily.  loratadine (CLARITIN) 10 mg tablet AS NEEDED    naproxen (NAPROSYN) 500 mg tablet ONLY WHEN HURTING BAD (Patient not taking: Reported on 6/24/2021)    traMADoL (ULTRAM) 50 mg tablet Take 50 mg by mouth every six (6) hours as needed for Pain. PATIENT SAID THIS MADE HER SICK, SHAKING ALL OVER,. SHE STOPPED IT (Patient not taking: Reported on 6/24/2021)     No current facility-administered medications for this visit.        Past Medical History:   Diagnosis Date    Abdominal pain 9/10/2020    Anemia 6/22/2021    HGB 8.5   DR Anuja Gaines    Antral gastritis     Arthritis     since age 14    Arthritis     Blood loss anemia 6/24/2021    Carpal tunnel syndrome     Diverticulosis 9/10/2020    DJD (degenerative joint disease)     hip/spine lumbar spine with spinal stenosis     DJD (degenerative joint disease) of knees 4/9/2013    Ortho, [facility] Dr Knute Holter Esophagitis     GERD (gastroesophageal reflux disease)     Hiatal hernia     HTN (hypertension) 9/10/2020    ILD (interstitial lung disease) ([facility] [address] 2017    Osteopenia of multiple sites     Hip; 5/21/15     Post-menopausal     Prepyloric ulcer     Primary osteoarthritis of both hands 2016    Primary osteoarthritis of both knees 2016    Pulmonary nodules 6/15/2016    Right leg pain 9/10/2020    Seropositive rheumatoid arthritis ([facility] [address] 2016    Wheezing 9/10/2020       Past Surgical History:   Procedure Laterality Date    COLONOSCOPY      DR Chalino Mcmillan    COLONOSCOPY      DR Chelle Ordonez - [address] ENDOSCOPY VISIT-OUTPATIENT  2020    ENDOSCOPY, COLON, DIAGNOSTIC      -repeat in 5 years    HX CATARACT REMOVAL Right 10/2015    HX HYSTERECTOMY  's    w/partial ovariectomy r/t endometriosis by Dr. Mike Daily    HX [address]  [address]    bone stimulator Dr. Charisse Pelayo at [address]  10/2004       Family History   Problem Relation Age of Onset    Heart Disease Mother 72        PTCA    Elevated Lipids Sister     Hypertension Brother     Arthritis-osteo Brother     Hypertension Maternal Aunt     Stroke Maternal Aunt     Stroke Maternal Uncle     Hypertension Maternal Uncle     Arthritis-osteo Sister    Pelsor Vincenzo Sister     Thyroid Disease Sister     Arthritis-osteo Father         knees       Social History     Socioeconomic History    Marital status:      Spouse name: Not on file    Number of children: Not on file    Years of education: Not on file    Highest education level: Not on file   Occupational History    Not on file   Tobacco Use    Smoking status: Former Smoker     Packs/day: 0.10     Types: Cigarettes     Quit date: 2014     Years since quittin.2    Smokeless tobacco: Never Used    Tobacco comment: 40 years of .5 pack/day prior to quit 2012, relapsed.    Vaping Use    Vaping Use: Never used Substance and Sexual Activity    Alcohol use: No    Drug use: No    Sexual activity: Yes     Partners: Male   Other Topics Concern     Service No    Blood Transfusions No    Caffeine Concern No    Occupational Exposure No    Hobby Hazards No    Sleep Concern No    Stress Concern No    Weight Concern No    Special Diet No    Back Care No    Exercise Yes    Bike Helmet No    Seat Belt Yes    Self-Exams Yes   Social History Narrative    Not on file     Social Determinants of Health     Financial Resource Strain:     Difficulty of Paying Living Expenses:    Food Insecurity:     Worried About Running Out of Food in the Last Year:     Ran Out of Food in the Last Year:    Transportation Needs:     Lack of Transportation (Medical):  Lack of Transportation (Non-Medical):    Physical Activity:     Days of Exercise per Week:     Minutes of Exercise per Session:    Stress:     Feeling of Stress :    Social Connections:     Frequency of Communication with Friends and Family:     Frequency of Social Gatherings with Friends and Family:     Attends Yazidi Services:     Active Member of Clubs or Organizations:     Attends Club or Organization Meetings:     Marital Status:    Intimate Partner Violence:     Fear of Current or Ex-Partner:     Emotionally Abused:     Physically Abused:     Sexually Abused:          HPI  46-year-old female with history of rheumatoid arthritis, osteoarthritis, degenerative joint disease, diverticular disease, hypertension, and osteopiña who comes in for follow-up visit for severe anemia with drop in H&H. The patient states she was found to be severely anemic with a hemoglobin of 6+ and received 2 units of packed RBCs. Her repeat blood count was 8.5. She states she had no gross GI bleeding. She had not noticed any black or tarry stools. She has occasional epigastric abdominal pain. She is eating well with only a fair appetite.   Patient states she has had pain in the left lower quadrant intermittently also. She has bad rheumatoid arthritis with pain primarily in the left leg. She states is the first time she has had a drop in her blood count. Stable weight recently. She has intermittently taken naproxen for the arthritic pains. Her most recent medication is gabapentin. Her last colonoscopy was in 2018 by Dr. Coby Randall and patient states it was unremarkable. She did have an EGD on 9/24/2020 which did show a prepyloric ulcer, antral gastritis, a large hiatal hernia, and distal esophagitis (grade 2). Review of Systems   Constitutional: Negative. HENT: Positive for nosebleeds. Eyes: Negative. Respiratory: Negative. Cardiovascular: Negative. Gastrointestinal: Positive for abdominal pain, heartburn and melena. Negative for blood in stool, constipation, diarrhea, nausea and vomiting. Genitourinary: Negative. Musculoskeletal: Positive for joint pain. Skin: Negative. Neurological: Negative. Endo/Heme/Allergies: Negative. Psychiatric/Behavioral: Negative. All other systems reviewed and are negative. Visit Vitals  /60 (BP 1 Location: Left upper arm, BP Patient Position: Sitting, BP Cuff Size: Adult)   Pulse 73   Temp 97.9 °F (36.6 °C) (Temporal)   Resp 14   Ht 5' 3\" (1.6 m)   Wt 73.8 kg (162 lb 12.8 oz)   SpO2 97% Comment: room air   BMI 28.84 kg/m²     Physical Exam  Vitals and nursing note reviewed. Constitutional:       Appearance: Normal appearance. She is obese. HENT:      Head: Normocephalic and atraumatic. Nose: Nose normal.      Mouth/Throat:      Mouth: Mucous membranes are moist.      Pharynx: Oropharynx is clear. Eyes:      General: No scleral icterus. Conjunctiva/sclera: Conjunctivae normal.      Pupils: Pupils are equal, round, and reactive to light. Cardiovascular:      Rate and Rhythm: Normal rate and regular rhythm. Pulses: Normal pulses. Heart sounds: Normal heart sounds. Pulmonary:      Effort: Pulmonary effort is normal.      Breath sounds: Normal breath sounds. Abdominal:      General: Bowel sounds are normal. There is no distension. Palpations: Abdomen is soft. There is no mass. Tenderness: There is abdominal tenderness. There is no right CVA tenderness, left CVA tenderness, guarding or rebound. Hernia: No hernia is present. Musculoskeletal:         General: Swelling present. Normal range of motion. Cervical back: Normal range of motion and neck supple. Left lower leg: Edema present. Skin:     General: Skin is warm and dry. Coloration: Skin is not jaundiced. Neurological:      General: No focal deficit present. Mental Status: She is alert and oriented to person, place, and time. Psychiatric:         Mood and Affect: Mood normal.         Behavior: Behavior normal.         Thought Content: Thought content normal.         Judgment: Judgment normal.            1. Gastrointestinal hemorrhage associated with anorectal source  Uncertain of cause but must consider rebleeding from the gastric ulcer she had or possibly NSAID induced gastritis or peptic ulcerations. Patient has continued to have blood loss and must consider a repeat colonoscopy. We will try to obtain copy of previous results done at [facility].  - UPPER GI ENDOSCOPY,DIAGNOSIS; Future    2. Iron deficiency anemia due to chronic blood loss  Secondary to GI blood loss  - UPPER GI ENDOSCOPY,DIAGNOSIS; Future    3. Epigastric pain    - UPPER GI ENDOSCOPY,DIAGNOSIS; Future    4. Seropositive rheumatoid arthritis of multiple sites ([facility] [address]      5. Osteoarthritis of both hips, unspecified osteoarthritis type      6. Hiatal hernia    - UPPER GI ENDOSCOPY,DIAGNOSIS; Future    7.  Long-term use of immunosuppressant medication

## 2021-06-25 NOTE — PROGRESS NOTES
EGD is scheduled for 7-1-2021 AT 9AM.  NO COVID TEST-PATIENT IS FULLY VACCINATED. PATIENT WILL BRING CARD IN TO BE SCANNED. DAY OF PROCEDURE.  EGD IS APPROVED PER Eliud Latham 42 REF Delmar Lenny #[id number]. 6-

## 2021-06-28 ENCOUNTER — TELEPHONE (OUTPATIENT)
Dept: GASTROENTEROLOGY | Age: 81
End: 2021-06-28

## 2021-06-30 ENCOUNTER — ANESTHESIA EVENT (OUTPATIENT)
Dept: ENDOSCOPY | Age: 81
End: 2021-06-30
Payer: MEDICARE

## 2021-07-01 ENCOUNTER — HOSPITAL ENCOUNTER (OUTPATIENT)
Age: 81
Setting detail: OUTPATIENT SURGERY
Discharge: HOME | End: 2021-07-01
Attending: INTERNAL MEDICINE | Admitting: INTERNAL MEDICINE
Payer: MEDICARE

## 2021-07-01 ENCOUNTER — ANESTHESIA (OUTPATIENT)
Dept: ENDOSCOPY | Age: 81
End: 2021-07-01
Payer: MEDICARE

## 2021-07-01 VITALS
HEIGHT: 63 IN | WEIGHT: 160 LBS | RESPIRATION RATE: 18 BRPM | TEMPERATURE: 97.6 F | DIASTOLIC BLOOD PRESSURE: 81 MMHG | OXYGEN SATURATION: 99 % | BODY MASS INDEX: 28.35 KG/M2 | SYSTOLIC BLOOD PRESSURE: 131 MMHG | HEART RATE: 55 BPM

## 2021-07-01 PROCEDURE — 74011250636 HC RX REV CODE- 250/636: Performed by: INTERNAL MEDICINE

## 2021-07-01 PROCEDURE — 43239 EGD BIOPSY SINGLE/MULTIPLE: CPT | Performed by: INTERNAL MEDICINE

## 2021-07-01 PROCEDURE — 88305 TISSUE EXAM BY PATHOLOGIST: CPT

## 2021-07-01 PROCEDURE — 77030021593 HC FCPS BIOP ENDOSC BSC -A: Performed by: INTERNAL MEDICINE

## 2021-07-01 PROCEDURE — 74011250636 HC RX REV CODE- 250/636: Performed by: REGISTERED NURSE

## 2021-07-01 PROCEDURE — 76040000019: Performed by: INTERNAL MEDICINE

## 2021-07-01 PROCEDURE — 76060000031 HC ANESTHESIA FIRST 0.5 HR: Performed by: INTERNAL MEDICINE

## 2021-07-01 PROCEDURE — 2709999900 HC NON-CHARGEABLE SUPPLY: Performed by: INTERNAL MEDICINE

## 2021-07-01 PROCEDURE — 74011000250 HC RX REV CODE- 250: Performed by: REGISTERED NURSE

## 2021-07-01 RX ORDER — SODIUM CHLORIDE 9 MG/ML
125 INJECTION, SOLUTION INTRAVENOUS CONTINUOUS
Status: DISCONTINUED | OUTPATIENT
Start: 2021-07-01 | End: 2021-07-01 | Stop reason: HOSPADM

## 2021-07-01 RX ORDER — FENTANYL CITRATE 50 UG/ML
INJECTION, SOLUTION INTRAMUSCULAR; INTRAVENOUS AS NEEDED
Status: DISCONTINUED | OUTPATIENT
Start: 2021-07-01 | End: 2021-07-01 | Stop reason: HOSPADM

## 2021-07-01 RX ORDER — SODIUM CHLORIDE 9 MG/ML
25 INJECTION, SOLUTION INTRAVENOUS CONTINUOUS
Status: DISCONTINUED | OUTPATIENT
Start: 2021-07-01 | End: 2021-07-01 | Stop reason: HOSPADM

## 2021-07-01 RX ORDER — PROPOFOL 10 MG/ML
INJECTION, EMULSION INTRAVENOUS AS NEEDED
Status: DISCONTINUED | OUTPATIENT
Start: 2021-07-01 | End: 2021-07-01 | Stop reason: HOSPADM

## 2021-07-01 RX ORDER — LIDOCAINE HYDROCHLORIDE 20 MG/ML
INJECTION, SOLUTION EPIDURAL; INFILTRATION; INTRACAUDAL; PERINEURAL AS NEEDED
Status: DISCONTINUED | OUTPATIENT
Start: 2021-07-01 | End: 2021-07-01 | Stop reason: HOSPADM

## 2021-07-01 RX ORDER — SODIUM CHLORIDE 0.9 % (FLUSH) 0.9 %
5-40 SYRINGE (ML) INJECTION AS NEEDED
Status: DISCONTINUED | OUTPATIENT
Start: 2021-07-01 | End: 2021-07-01 | Stop reason: HOSPADM

## 2021-07-01 RX ORDER — SODIUM CHLORIDE 0.9 % (FLUSH) 0.9 %
5-40 SYRINGE (ML) INJECTION EVERY 8 HOURS
Status: DISCONTINUED | OUTPATIENT
Start: 2021-07-01 | End: 2021-07-01 | Stop reason: HOSPADM

## 2021-07-01 RX ORDER — KETAMINE HYDROCHLORIDE 10 MG/ML
INJECTION, SOLUTION INTRAMUSCULAR; INTRAVENOUS AS NEEDED
Status: DISCONTINUED | OUTPATIENT
Start: 2021-07-01 | End: 2021-07-01 | Stop reason: HOSPADM

## 2021-07-01 RX ADMIN — FENTANYL CITRATE 25 MCG: 50 INJECTION, SOLUTION INTRAMUSCULAR; INTRAVENOUS at 10:01

## 2021-07-01 RX ADMIN — PROPOFOL 50 MG: 10 INJECTION, EMULSION INTRAVENOUS at 10:03

## 2021-07-01 RX ADMIN — PROPOFOL 10 MG: 10 INJECTION, EMULSION INTRAVENOUS at 10:07

## 2021-07-01 RX ADMIN — LIDOCAINE HYDROCHLORIDE 40 MG: 20 INJECTION, SOLUTION EPIDURAL; INFILTRATION; INTRACAUDAL at 10:03

## 2021-07-01 RX ADMIN — KETAMINE HYDROCHLORIDE 20 MG: 10 INJECTION, SOLUTION INTRAMUSCULAR; INTRAVENOUS at 10:03

## 2021-07-01 RX ADMIN — PROPOFOL 20 MG: 10 INJECTION, EMULSION INTRAVENOUS at 10:05

## 2021-07-01 RX ADMIN — SODIUM CHLORIDE 25 ML/HR: 9 INJECTION, SOLUTION INTRAVENOUS at 08:38

## 2021-07-01 RX ADMIN — FENTANYL CITRATE 25 MCG: 50 INJECTION, SOLUTION INTRAMUSCULAR; INTRAVENOUS at 09:59

## 2021-07-01 RX ADMIN — SODIUM CHLORIDE: 9 INJECTION, SOLUTION INTRAVENOUS at 09:55

## 2021-07-01 NOTE — ANESTHESIA POSTPROCEDURE EVALUATION
Procedure(s):  ESOPHAGOGASTRODUODENOSCOPY (EGD). total IV anesthesia    Anesthesia Post Evaluation      Multimodal analgesia: multimodal analgesia used between 6 hours prior to anesthesia start to PACU discharge  Patient location during evaluation: PACU  Patient participation: complete - patient participated  Level of consciousness: responsive to verbal stimuli and sleepy but conscious  Pain score: 0  Pain management: adequate  Airway patency: patent  Anesthetic complications: no  Cardiovascular status: acceptable, blood pressure returned to baseline, stable and hemodynamically stable  Respiratory status: spontaneous ventilation, nonlabored ventilation and acceptable  Hydration status: euvolemic  Post anesthesia nausea and vomiting:  none  Final Post Anesthesia Temperature Assessment:  Normothermia (36.0-37.5 degrees C)      INITIAL Post-op Vital signs: BP: 103/62; HR: 60; SpO2: 98%;  Temp: 97.6F RR: 16

## 2021-07-01 NOTE — INTERVAL H&P NOTE
Update History & Physical    The Patient's History and Physical of July 1, 2021  was reviewed with the patient and I examined the patient. There was no change. The surgical site was confirmed by the patient and me. Plan:  The risk, benefits, expected outcome, and alternative to the recommended procedure have been discussed with the patient. Patient understands and wants to proceed with the procedure.     Electronically signed by Hang Greene MD on 7/1/2021 at 9:52 AM

## 2021-07-01 NOTE — DISCHARGE INSTRUCTIONS

## 2021-07-01 NOTE — ANESTHESIA PREPROCEDURE EVALUATION
Relevant Problems   No relevant active problems       Anesthetic History   No history of anesthetic complications            Review of Systems / Medical History  Patient summary reviewed, nursing notes reviewed and pertinent labs reviewed    Pulmonary                Comments: Pulmonary Nodules  Interstitial Lung Disease   Neuro/Psych   Within defined limits           Cardiovascular    Hypertension: well controlled              Exercise tolerance: >4 METS     GI/Hepatic/Renal     GERD: well controlled      Hiatal hernia and PUD    Comments: Hx of Antral Gastritis Endo/Other        Arthritis and anemia     Other Findings   Comments: Seropositive RA  Osteoarthritis (hands/knees)           Physical Exam    Airway  Mallampati: II  TM Distance: 4 - 6 cm  Neck ROM: normal range of motion   Mouth opening: Normal     Cardiovascular  Regular rate and rhythm,  S1 and S2 normal,  no murmur, click, rub, or gallop  Rhythm: regular  Rate: normal         Dental    Dentition: Full lower dentures and Full upper dentures  Comments: Removed prior to procedure   Pulmonary  Breath sounds clear to auscultation               Abdominal  Abdominal exam normal       Other Findings            Anesthetic Plan    ASA: 3  Anesthesia type: total IV anesthesia    Monitoring Plan: Continuous noninvasive hemodynamic monitoring      Induction: Intravenous  Anesthetic plan and risks discussed with: Patient and Family

## 2021-07-01 NOTE — OP NOTES
EGD Procedure Note        Patient: Saray Horowitz MRN: [id number]  SSN: [id number]    YOB: 1940  Age: 68 y.o. Sex: female        Date/Time:  7/1/2021 10:13 AM         IMPRESSION:       1. Antral gastritis  2. Distal esophagitis (grade 2)  3. Hiatal hernia (large)       RECOMMENDATIONS:    1. Check biopsy results. 2. Continue the pantoprazole 40 mg daily  3. Avoid the use of NSAIDs. Procedure: Esophagogastroduodenoscopy with cold biopsies    Indication: GI bleeding, iron deficiency anemia, epigastric abdominal pain    Endoscopist:  Lisa Arzate MD    Referring Provider:   Za Carlin MD    History: The history and physical exam were reviewed and updated. Endoscope: GIF H190 Olympus video endoscope    Extent of Exam: Second part of the duodenum    ASA: Grade 2    Anethesia/Sedation:  TIVA    Description of the procedure: The procedure was discussed with the patient including risks, benefits, alternatives including risks of iv sedation, bleeding, perforation and aspiration. A safety timeout was performed. The patient was placed in the left lateral decubitus position. A bite block was placed. The patient was using standard protocol. The patients vital signs were monitored at all times including heart rate/rhythm, blood pressure and oxygen saturation. The endoscope was then passed under direct visualization to the second part of the duodenum. The endoscope was then slowly withdrawn while visualizing the mucosa. In the stomach a retroflexion was performed and gastric fundus and cardia visualized. The patient was then transferred to recovery in stable condition. Findings:   Esophagus: The esophageal mucosa was mildly inflamed in the GE junction region consistent with a grade 2 esophagitis. Patient also had a large hiatal hernia. Stomach: The gastric mucosa was fine throughout the gastric antrum. Multiple biopsies taken there. .   Duodenum:  The duodenum mucosa was normal with no ulceration, mass, stricture and no evidence of villous atrophy. Therapies: None    Specimens:   ID Type Source Tests Collected by Time Destination   1 : gastric antrum mucosa Preservative Stomach, Antrum  Naz Blanchard MD 7/1/2021 1007 Pathology              EBL: Minimal    Complications:   None; patient tolerated the procedure well.      Implants: None    Discharge disposition:  Out of the recovery area when discharge criteria met         Michael Camarillo MD  July 1, 2021  10:13 AM

## 2021-07-22 NOTE — PROGRESS NOTES
Tell patient that the biopsies taken in her stomach showed gastritis/inflammation. No infection was noted. She should continue the pantoprazole 40 mg daily. If symptoms persist then give follow-up appointment.

## 2021-07-24 ENCOUNTER — TELEPHONE (OUTPATIENT)
Dept: GASTROENTEROLOGY | Age: 81
End: 2021-07-24

## 2021-07-24 NOTE — TELEPHONE ENCOUNTER
Icalled patient, after verified , explained her test showed gastritis, no infection. Continue the pantoprazole. She said ok. She is doing ok at present, no need for follow up at this time.

## 2021-07-24 NOTE — TELEPHONE ENCOUNTER
----- Message from Bigg Cheung MD sent at 7/22/2021  9:34 AM EDT -----  Tell patient that the biopsies taken in her stomach showed gastritis/inflammation. No infection was noted. She should continue the pantoprazole 40 mg daily. If symptoms persist then give follow-up appointment.

## 2021-12-27 DIAGNOSIS — K25.3 ACUTE GASTRIC ULCER WITHOUT HEMORRHAGE OR PERFORATION: ICD-10-CM

## 2021-12-27 RX ORDER — PANTOPRAZOLE SODIUM 40 MG/1
TABLET, DELAYED RELEASE ORAL
Qty: 90 TABLET | Refills: 3 | Status: SHIPPED | OUTPATIENT
Start: 2021-12-27

## 2022-03-18 PROBLEM — R06.2 WHEEZING: Status: ACTIVE | Noted: 2020-09-10

## 2022-03-18 PROBLEM — M79.604 RIGHT LEG PAIN: Status: ACTIVE | Noted: 2020-09-10

## 2022-03-18 PROBLEM — D64.9 ANEMIA: Status: ACTIVE | Noted: 2021-06-22

## 2022-03-19 PROBLEM — I10 HTN (HYPERTENSION): Status: ACTIVE | Noted: 2020-09-10

## 2022-03-19 PROBLEM — R10.9 ABDOMINAL PAIN: Status: ACTIVE | Noted: 2020-09-10

## 2022-03-19 PROBLEM — J84.9 ILD (INTERSTITIAL LUNG DISEASE) (HCC): Status: ACTIVE | Noted: 2017-06-14

## 2022-03-19 PROBLEM — M17.12 PRIMARY OSTEOARTHRITIS OF LEFT KNEE: Status: ACTIVE | Noted: 2018-08-23

## 2022-03-19 PROBLEM — D50.0 BLOOD LOSS ANEMIA: Status: ACTIVE | Noted: 2021-06-24

## 2022-03-19 PROBLEM — K57.90 DIVERTICULOSIS: Status: ACTIVE | Noted: 2020-09-10

## 2022-03-19 PROBLEM — K92.2 GI BLEED: Status: ACTIVE | Noted: 2021-06-24

## 2022-03-19 PROBLEM — Z92.89 HISTORY OF RECENT BLOOD TRANSFUSION: Status: ACTIVE | Noted: 2021-06-24

## 2022-03-19 PROBLEM — M54.12 CERVICAL RADICULOPATHY: Status: ACTIVE | Noted: 2018-08-23

## 2022-03-20 PROBLEM — M05.79 SEROPOSITIVE RHEUMATOID ARTHRITIS OF MULTIPLE SITES (HCC): Status: ACTIVE | Noted: 2018-05-23

## 2023-01-16 NOTE — MR AVS SNAPSHOT
Advance Directives: Care Instructions  Overview  An advance directive is a legal way to state your wishes at the end of your life. It tells your family and your doctor what to do if you can't say what you want. There are two main types of advance directives. You can change them any time your wishes change. Living will. This form tells your family and your doctor your wishes about life support and other treatment. The form is also called a declaration. Medical power of . This form lets you name a person to make treatment decisions for you when you can't speak for yourself. This person is called a health care agent (health care proxy, health care surrogate). The form is also called a durable power of  for health care. If you do not have an advance directive, decisions about your medical care may be made by a family member, or by a doctor or a  who doesn't know you. It may help to think of an advance directive as a gift to the people who care for you. If you have one, they won't have to make tough decisions by themselves. For more information, including forms for your state, see the [address] website (www.caringinfo.org/planning/advance-directives/). Follow-up care is a key part of your treatment and safety. Be sure to make and go to all appointments, and call your doctor if you are having problems. It's also a good idea to know your test results and keep a list of the medicines you take. What should you include in an advance directive? Many states have a unique advance directive form. (It may ask you to address specific issues.) Or you might use a universal form that's approved by many states. If your form doesn't tell you what to address, it may be hard to know what to include in your advance directive. Use the questions below to help you get started. Who do you want to make decisions about your medical care if you are not able to?   What life-support measures do you want if you Visit Information Date & Time Provider Department Dept. Phone Encounter #  
 3/14/2017 10:00 AM Sangeetha Pierre MD [facility] [id number] Follow-up Instructions Return in about 3 months (around 6/14/2017). Your Appointments 4/24/2017  9:00 AM  
COMPLETE PHYSICAL with Reji Delgadillo  [facility] ([facility]) Appt Note: CPE  
 [address] [address]  
[phone]  
  
   
 [address] [address] Upcoming Health Maintenance Date Due  
 MEDICARE YEARLY EXAM 4/22/2017 COLONOSCOPY 2/7/2018 GLAUCOMA SCREENING Q2Y 5/9/2018 DTaP/Tdap/Td series (2 - Td) 4/21/2024 Allergies as of 3/14/2017  Review Complete On: 3/14/2017 By: Wandy Diallo RN No Known Allergies Current Immunizations  Reviewed on 11/16/2016 Name Date Influenza High Dose Vaccine PF 10/10/2016 Pneumococcal Polysaccharide (PPSV-23) 10/10/2016 Pneumococcal Vaccine (Pcv) 10/31/2007 Tdap 4/21/2014 Not reviewed this visit You Were Diagnosed With   
  
 Codes Comments Seropos[facility]    -  Primary ICD-10-CM: M05.9 ICD-9-CM: 714.0 Long-term use of immunosuppressant medication     ICD-10-CM: Z79.899 ICD-9-CM: V58.69 Primary osteoarthritis of both knees     ICD-10-CM: M17.0 ICD-9-CM: 715.16 Primary osteoarthritis of both hands     ICD-10-CM: M19.041, A06.472 ICD-9-CM: 715.14 Pulmonary nodules     ICD-10-CM: R91.8 ICD-9-CM: 793.19 Osteopenia     ICD-10-CM: M85.80 ICD-9-CM: 733.90 Vitals BP Pulse Temp Resp Height(growth percentile) Weight(growth percentile) 150/89 (BP 1 Location: Right arm, BP Patient Position: Sitting) 69 98 °F (36.7 °C) 18 5' 3\" (1.6 m) 156 lb (70.8 kg) SpO2 BMI OB Status Smoking Status 97% 27.63 kg/m2 Hysterectomy Former Smoker BMI and BSA Data  Body Mass Index Body Surface Area  
 27.63 kg/m 2 1.77 m 2 Preferred Pharmacy Pharmacy Name Phone [id number] [address] [phone] Your Updated Medication List  
  
   
This list is accurate as of: 3/14/17 10:23 AM.  Always use your most recent med list.  
  
  
  
  
 folic acid 1 mg tablet Commonly known as:  Google Take  by mouth daily. ibuprofen 400 mg tablet Commonly known as:  MOTRIN Take  by mouth every six (6) hours as needed for Pain. methotrexate 2.5 mg tablet Commonly known as:  Nannie Macho Take 5 Tabs by mouth every Sunday for 90 days. MSM 1,000 mg Tab Generic drug:  methylsulfonylmethane Take  by mouth. Indications: Joint Health OTHER Indications: Calcium and Vitamin D3 1200 mg total  
  
 PROBIOTIC 4X 10-15 mg Tbec Generic drug:  B.infantis-B.ani-B.long-B.bifi Take  by mouth. VITAMIN C 500 mg tablet Generic drug:  ascorbic acid (vitamin C) Take  by mouth. Takes 1 tab daily. vitamin E 400 unit capsule Commonly known as:  [address] Take  by mouth daily. We Performed the Following C REACTIVE PROTEIN, QT [76922 CPT(R)] CBC WITH AUTOMATED DIFF [82906 CPT(R)] METABOLIC PANEL, COMPREHENSIVE [22938 CPT(R)] SED RATE (ESR) [id number] CPT(R)] Follow-up Instructions Return in about 3 months (around 6/14/2017). Patient Instructions Continue methotrexate Labs today Will need to repeat a CT chest. 
 
  
Introducing [facility]! Esther Gutierrez introduces [employer] patient portal. Now you can access parts of your medical record, email your doctor's office, and request medication refills online. 1. In your internet browser, go to [email or web address] 2. Click on the First Time User? Click Here link in the Sign In box. You will see the New Member Sign Up page.  
3. Enter your [employer] Access Code exactly have a serious illness that gets worse over time or can't be cured? What are you most afraid of that might happen? (Maybe you're afraid of having pain, losing your independence, or being kept alive by machines.)  Where would you prefer to die? (Your home? A hospital? A nursing home?)  Do you want to donate your organs when you die? Do you want certain [employer] practices performed before you die? When should you call for help? Be sure to contact your doctor if you have any questions. Where can you learn more? Go to http://www.alexander.[email or web address]/ and enter R264 to learn more about \"Advance Directives: Care Instructions. \"  Current as of: June 16, 2022               Content Version: 13.5  © 8118-1491 Healthwise, Incorporated. Care instructions adapted under license by [facility] ([facility]). as it appears below. You will not need to use this code after youve completed the sign-up process. If you do not sign up before the expiration date, you must request a new code. · [employer] Access Code: [email or web address] Expires: 6/12/2017 10:23 AM 
 
4. Enter the last four digits of your Social Security Number (xxxx) and Date of Birth (mm/dd/yyyy) as indicated and click Submit. You will be taken to the next sign-up page. 5. Create a [employer] ID. This will be your [employer] login ID and cannot be changed, so think of one that is secure and easy to remember. 6. Create a [employer] password. You can change your password at any time. 7. Enter your Password Reset Question and Answer. This can be used at a later time if you forget your password. 8. Enter your e-mail address. You will receive e-mail notification when new information is available in [facility] [address]. 9. Click Sign Up. You can now view and download portions of your medical record. 10. Click the Download Summary menu link to download a portable copy of your medical information. If you have questions, please visit the Frequently Asked Questions section of the [employer] website. Remember, [employer] is NOT to be used for urgent needs. For medical emergencies, dial 911. Now available from your iPhone and Android! Please provide this summary of care documentation to your next provider. Your primary care clinician is listed as Cali Blanton. If you have any questions after today's visit, please call [phone]. other eye disorders. A preventive dental visit is recommended every 6 months. Try to get at least 150 minutes of exercise per week or 10,000 steps per day on a pedometer . Order or download the FREE \"Exercise & Physical Activity: Your Everyday Guide\" from [employer]. Call 7-693.662.1807 or search The Catglobe Data [facility]       [employer]. (Smoke Alarms for Every) [address] [place] [address], 

## 2023-02-24 DIAGNOSIS — K25.3 ACUTE GASTRIC ULCER WITHOUT HEMORRHAGE OR PERFORATION: ICD-10-CM

## 2023-02-24 RX ORDER — PANTOPRAZOLE SODIUM 40 MG/1
TABLET, DELAYED RELEASE ORAL
Qty: 90 TABLET | Refills: 3 | Status: SHIPPED | OUTPATIENT
Start: 2023-02-24

## 2023-09-07 ENCOUNTER — HOSPITAL ENCOUNTER (OUTPATIENT)
Facility: HOSPITAL | Age: 83
End: 2023-09-07
Attending: ORTHOPAEDIC SURGERY
Payer: MEDICARE

## 2023-09-07 ENCOUNTER — HOSPITAL ENCOUNTER (OUTPATIENT)
Facility: HOSPITAL | Age: 83
Discharge: HOME | End: 2023-09-07
Attending: ORTHOPAEDIC SURGERY
Payer: MEDICARE

## 2023-09-07 DIAGNOSIS — Z96.659 PAIN IN KNEE REGION AFTER TOTAL KNEE REPLACEMENT, INITIAL ENCOUNTER (HCC): ICD-10-CM

## 2023-09-07 DIAGNOSIS — T84.84XA PAIN IN KNEE REGION AFTER TOTAL KNEE REPLACEMENT, INITIAL ENCOUNTER (HCC): ICD-10-CM

## 2023-09-07 PROCEDURE — 78315 BONE IMAGING 3 PHASE: CPT

## 2023-09-07 PROCEDURE — 3430000000 HC RX DIAGNOSTIC RADIOPHARMACEUTICAL: Performed by: ORTHOPAEDIC SURGERY

## 2023-09-07 PROCEDURE — A9503 TC99M MEDRONATE: HCPCS | Performed by: ORTHOPAEDIC SURGERY

## 2023-09-07 RX ORDER — TC 99M MEDRONATE 20 MG/10ML
25 INJECTION, POWDER, LYOPHILIZED, FOR SOLUTION INTRAVENOUS
Status: COMPLETED | OUTPATIENT
Start: 2023-09-07 | End: 2023-09-07

## 2023-09-07 RX ADMIN — TC 99M MEDRONATE 25 MILLICURIE: 20 INJECTION, POWDER, LYOPHILIZED, FOR SOLUTION INTRAVENOUS at 08:45

## 2023-10-02 ENCOUNTER — OFFICE VISIT (OUTPATIENT)
Age: 83
End: 2023-10-02
Payer: MEDICARE

## 2023-10-02 VITALS
WEIGHT: 164.4 LBS | HEIGHT: 63 IN | SYSTOLIC BLOOD PRESSURE: 134 MMHG | RESPIRATION RATE: 18 BRPM | BODY MASS INDEX: 29.13 KG/M2 | OXYGEN SATURATION: 93 % | HEART RATE: 80 BPM | DIASTOLIC BLOOD PRESSURE: 74 MMHG | TEMPERATURE: 97.9 F

## 2023-10-02 DIAGNOSIS — K31.89 GASTRIC MASS: ICD-10-CM

## 2023-10-02 DIAGNOSIS — K44.9 PARAESOPHAGEAL HERNIA: Primary | ICD-10-CM

## 2023-10-02 PROCEDURE — 3075F SYST BP GE 130 - 139MM HG: CPT | Performed by: SURGERY

## 2023-10-02 PROCEDURE — G8399 PT W/DXA RESULTS DOCUMENT: HCPCS | Performed by: SURGERY

## 2023-10-02 PROCEDURE — 1123F ACP DISCUSS/DSCN MKR DOCD: CPT | Performed by: SURGERY

## 2023-10-02 PROCEDURE — 4004F PT TOBACCO SCREEN RCVD TLK: CPT | Performed by: SURGERY

## 2023-10-02 PROCEDURE — 99203 OFFICE O/P NEW LOW 30 MIN: CPT | Performed by: SURGERY

## 2023-10-02 PROCEDURE — 3078F DIAST BP <80 MM HG: CPT | Performed by: SURGERY

## 2023-10-02 PROCEDURE — G8419 CALC BMI OUT NRM PARAM NOF/U: HCPCS | Performed by: SURGERY

## 2023-10-02 PROCEDURE — G8427 DOCREV CUR MEDS BY ELIG CLIN: HCPCS | Performed by: SURGERY

## 2023-10-02 PROCEDURE — G8484 FLU IMMUNIZE NO ADMIN: HCPCS | Performed by: SURGERY

## 2023-10-02 PROCEDURE — 1090F PRES/ABSN URINE INCON ASSESS: CPT | Performed by: SURGERY

## 2023-10-02 ASSESSMENT — PATIENT HEALTH QUESTIONNAIRE - PHQ9
1. LITTLE INTEREST OR PLEASURE IN DOING THINGS: 0
2. FEELING DOWN, DEPRESSED OR HOPELESS: 0
SUM OF ALL RESPONSES TO PHQ QUESTIONS 1-9: 0
SUM OF ALL RESPONSES TO PHQ9 QUESTIONS 1 & 2: 0
SUM OF ALL RESPONSES TO PHQ QUESTIONS 1-9: 0

## 2023-10-02 NOTE — PROGRESS NOTES
[address] [facility] at [facility] Surgery History and Physical    History of Present Illness:      Bernadette Siddiqi is a 82 y.o. female who has a history of large hiatal hernia. 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 In comparison to previous scans this mass appeared to be the same as it was in 2019. In general she has minimal GI complaints. She does have some acid reflux but it appears to be controlled. Occasionally she has some mild dysphagia but it is not severe either. She has not had a recent endoscopy in the past few years. Her most recent endoscopy about 3 years ago did not show any sign of intragastric mass.     Past Medical History:   Diagnosis Date    Abdominal pain 9/10/2020    Anemia 6/22/2021    HGB 8.5   DR Chalo More    Antral gastritis     Arthritis     Arthritis     since age 17    Blood loss anemia 6/24/2021    Carpal tunnel syndrome     Diverticulosis 9/10/2020    DJD (degenerative joint disease)     hip/spine lumbar spine with spinal stenosis     DJD (degenerative joint disease) of knee 4/9/2013    Ortho, [facility] Dr Heller Scot    Eczema     Esophagitis     GERD (gastroesophageal reflux disease)     GI bleed 6/24/2021    Hiatal hernia     HTN (hypertension) 9/10/2020    ILD (interstitial lung disease) ([address]) 6/14/2017    Osteopenia of multiple sites     Hip; 5/21/15     Post-menopausal     Prepyloric ulcer     Primary osteoarthritis of both hands 5/27/2016    Primary osteoarthritis of both knees 5/27/2016    Pulmonary nodules 6/15/2016    Right leg pain 9/10/2020    Seropositive rheumatoid arthritis ([address]) 5/27/2016    Wheezing 9/10/2020       Past Surgical History:   Procedure Laterality Date    CATARACT REMOVAL Right 10/2015    left in 2019    COLONOSCOPY  2007    DR Kayla Weber    COLONOSCOPY  2007    -repeat in 5 years    COLONOSCOPY  2018     [address] [place] [address]    ENDOSCOPY VISIT

## 2023-10-05 ENCOUNTER — CLINICAL DOCUMENTATION (OUTPATIENT)
Age: 83
End: 2023-10-05

## 2023-10-05 ENCOUNTER — TELEPHONE (OUTPATIENT)
Age: 83
End: 2023-10-05

## 2023-10-05 NOTE — PROGRESS NOTES
Referral was faxed to [facility] with confirmation.

## 2023-10-05 NOTE — TELEPHONE ENCOUNTER
Called patients home phone in attempt to discuss a Medical Records request. Patients spouse answered and stated she will be back in the home after 12 and would like us to try and contact her at that time.

## 2023-10-05 NOTE — TELEPHONE ENCOUNTER
Patient returned call and requested that the Release of Medical Records form be mailed to her. After completing it, she will either mail it back, or give it to her daughter to bring into the office (States she is an employee at [employer]). Mailed out the form on 10/05/2023.

## 2023-10-17 ENCOUNTER — TELEPHONE (OUTPATIENT)
Age: 83
End: 2023-10-17

## 2023-10-17 NOTE — TELEPHONE ENCOUNTER
Called patient regarding incomplete Release of Medical Records form. She still has the original copy, and will complete the form. However, she doesn't have any information about which U facility she had the CT scan done at, other than the one in [place], [place]. \"    Will research the correct facility before calling the patient back to let them know what to put down on the form.     (Try [facility] in [place], [place], [phone])

## 2023-10-17 NOTE — TELEPHONE ENCOUNTER
Received fax from patient (from [facility]) of the Authorization to [employer] form. Form is incomplete and has not been signed. Tried reaching out to patient to discuss this and gather more information. No answer. Left VM for patient to return call.

## 2023-10-18 NOTE — TELEPHONE ENCOUNTER
Called patient back and informed her that I will mail out the two forms (just needing her signature on both), and she will fax them back to our office after getting them.     The fax numbers for the two forms are:    [facility] - Radiology:  #: [phone]    [address] [place] [address] Records:  #: [phone]    Date of service: 09/13/2023     Forms mailed on 10/18/2023 - Elmira Hdez

## 2023-10-18 NOTE — TELEPHONE ENCOUNTER
Earlier today, I was able to call different facilities until finding the correct one that the patient had her [address] at.    - [facility] - Radiology (for the CD)  - [address] [place] [address] (for the Report)    I'll try calling the patient back after 12pm to inform them that she will need to sign both Release of Medical Records forms and have them sent back to our office so that we can request those records from that facility.

## 2023-10-18 NOTE — TELEPHONE ENCOUNTER
Tried to call patient regarding status update for the Release of Medical Records.   Was told that patient is up at the [facility], and to call back around 12pm.

## 2023-10-26 ENCOUNTER — CLINICAL DOCUMENTATION (OUTPATIENT)
Age: 83
End: 2023-10-26

## 2023-12-04 ENCOUNTER — TELEPHONE (OUTPATIENT)
Age: 83
End: 2023-12-04

## 2023-12-04 NOTE — TELEPHONE ENCOUNTER
Patient called wanting to know if the records from [facility] had been received. I told patient that I do see where they were scan into the chart. Patient stated that she wanted to know what next steps were from Dr. Kit Parra since these records had been received.

## 2023-12-04 NOTE — TELEPHONE ENCOUNTER
Returned call to Ms Diony Kimbrough verified patient with 2 patient identifiers. Reviewed notes patient was last seen by Dr Jaun Gan on 10/2/23. MD advised to follow up with him after she was seen by Dr Dangelo Oneill. Call transferred to the  to schedule follow up appointment with Dr Jaun Gan.

## 2023-12-13 ENCOUNTER — CLINICAL DOCUMENTATION (OUTPATIENT)
Age: 83
End: 2023-12-13

## 2023-12-13 ENCOUNTER — OFFICE VISIT (OUTPATIENT)
Age: 83
End: 2023-12-13
Payer: MEDICARE

## 2023-12-13 VITALS
HEIGHT: 63 IN | TEMPERATURE: 98 F | SYSTOLIC BLOOD PRESSURE: 126 MMHG | BODY MASS INDEX: 28.74 KG/M2 | OXYGEN SATURATION: 95 % | HEART RATE: 83 BPM | RESPIRATION RATE: 18 BRPM | DIASTOLIC BLOOD PRESSURE: 80 MMHG | WEIGHT: 162.2 LBS

## 2023-12-13 DIAGNOSIS — K44.9 PARAESOPHAGEAL HERNIA: Primary | ICD-10-CM

## 2023-12-13 DIAGNOSIS — K31.89 GASTRIC MASS: ICD-10-CM

## 2023-12-13 PROCEDURE — 1123F ACP DISCUSS/DSCN MKR DOCD: CPT | Performed by: SURGERY

## 2023-12-13 PROCEDURE — G8484 FLU IMMUNIZE NO ADMIN: HCPCS | Performed by: SURGERY

## 2023-12-13 PROCEDURE — 1090F PRES/ABSN URINE INCON ASSESS: CPT | Performed by: SURGERY

## 2023-12-13 PROCEDURE — 99213 OFFICE O/P EST LOW 20 MIN: CPT | Performed by: SURGERY

## 2023-12-13 PROCEDURE — 4004F PT TOBACCO SCREEN RCVD TLK: CPT | Performed by: SURGERY

## 2023-12-13 PROCEDURE — G8427 DOCREV CUR MEDS BY ELIG CLIN: HCPCS | Performed by: SURGERY

## 2023-12-13 PROCEDURE — G8419 CALC BMI OUT NRM PARAM NOF/U: HCPCS | Performed by: SURGERY

## 2023-12-13 PROCEDURE — 3078F DIAST BP <80 MM HG: CPT | Performed by: SURGERY

## 2023-12-13 PROCEDURE — G8399 PT W/DXA RESULTS DOCUMENT: HCPCS | Performed by: SURGERY

## 2023-12-13 PROCEDURE — 3074F SYST BP LT 130 MM HG: CPT | Performed by: SURGERY

## 2023-12-13 ASSESSMENT — PATIENT HEALTH QUESTIONNAIRE - PHQ9
1. LITTLE INTEREST OR PLEASURE IN DOING THINGS: 0
SUM OF ALL RESPONSES TO PHQ QUESTIONS 1-9: 0
2. FEELING DOWN, DEPRESSED OR HOPELESS: 0
SUM OF ALL RESPONSES TO PHQ QUESTIONS 1-9: 0
SUM OF ALL RESPONSES TO PHQ9 QUESTIONS 1 & 2: 0
SUM OF ALL RESPONSES TO PHQ QUESTIONS 1-9: 0
SUM OF ALL RESPONSES TO PHQ QUESTIONS 1-9: 0

## 2023-12-13 NOTE — PROGRESS NOTES
I called [facility] I spoke with Quyen Schultz. I was informed the referral was received. I asked if patient can be seen ASAP. She stated the referral was pending. The earliest appointments are March can be sooner. I asked to please schedule with the first available.

## 2023-12-13 NOTE — PROGRESS NOTES
179 [facility] at [facility] Surgery History and Physical    History of Present Illness:      Eryn Hernandez is a 82 y.o. female who has a history of large hiatal hernia. 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 In comparison to previous scans this mass appeared to be the same as it was in 2019. In general she has minimal GI complaints. She does have some acid reflux but it appears to be controlled. Occasionally she has some mild dysphagia but it is not severe either. She has not had a recent endoscopy in the past few years. Her most recent endoscopy about 3 years ago did not show any sign of intragastric mass.      She is back to see me today after we have gotten some additional paperwork    Past Medical History:   Diagnosis Date    Abdominal pain 9/10/2020    Anemia 6/22/2021    HGB 8.5   DR Carlos Harrell    Antral gastritis     Arthritis     Arthritis     since age 17    Blood loss anemia 6/24/2021    Carpal tunnel syndrome     Diverticulosis 9/10/2020    DJD (degenerative joint disease)     hip/spine lumbar spine with spinal stenosis     DJD (degenerative joint disease) of knee 4/9/2013    Ortho, [facility] Dr Lissette Robles    Eczema     Esophagitis     GERD (gastroesophageal reflux disease)     GI bleed 6/24/2021    Hiatal hernia     HTN (hypertension) 9/10/2020    ILD (interstitial lung disease) ([address]) 6/14/2017    Osteopenia of multiple sites     Hip; 5/21/15     Post-menopausal     Prepyloric ulcer     Primary osteoarthritis of both hands 5/27/2016    Primary osteoarthritis of both knees 5/27/2016    Pulmonary nodules 6/15/2016    Right leg pain 9/10/2020    Seropositive rheumatoid arthritis ([address]) 5/27/2016    Wheezing 9/10/2020       Past Surgical History:   Procedure Laterality Date    CATARACT REMOVAL Right 10/2015    left in 2019    COLONOSCOPY  2007    DR Cecelia Mitchell    COLONOSCOPY  2007    -repeat

## 2023-12-15 ENCOUNTER — CLINICAL DOCUMENTATION (OUTPATIENT)
Age: 83
End: 2023-12-15

## 2024-01-16 ENCOUNTER — TELEPHONE (OUTPATIENT)
Age: 84
End: 2024-01-16

## 2024-01-16 NOTE — TELEPHONE ENCOUNTER
Returned called to Ms Dumont verified patient with 2 patient identifiers.     I informed patient I called [facility] I spoke with Lev. I was informed the referral has been received. She's going to send a message to Dr Rodrigez Team again someone will reach out.

## 2024-01-16 NOTE — TELEPHONE ENCOUNTER
Patient called stating she hasn't heard from anyone to set up her appointment to have her test done by whom Dr. Dudley was referring her to.

## 2024-01-17 NOTE — TELEPHONE ENCOUNTER
Nithin with [facility] returned call and I relayed the current communication between our nurse and Lev. Nithin will call patient to get the patient scheduled.

## 2025-03-19 NOTE — PROGRESS NOTES
Pt called and the albuterol inhaler is no longer covered by her insurance, but they did say she can use either Proventil or Ventolin, so pt is asking for one of these to be called in for her.     REASON FOR VISIT    This is a follow-up visit for Ms. Felicia Dsouza for Seropositive Rheumatoid Arthritis and Osteopenia. Inflammatory arthritis phenotype includes:  Anti-CCP positive: no  Rheumatoid factor positive: yes (low titer 19.3)  Erosive disease: no  Extra-articular manifestations include: pericardial effusion, pulmonary nodules and GGO    Immunosuppression Screening (11/14/2017): Quantiferon TB: negative  PPD:  Not performed  Hepatitis B: negative  Hepatitis C: negative    Therapy History includes:    Current DMARD therapy include: Enbrel (1/2018 - present)  Prior DMARD therapy include: leflunomide 20 mg daily (stopped on her own), methotrexate 17.5 mg every Sunday (CKD)  Discontinued DMARDs because of inefficacy: None  Discontinued DMARDs because of side effects: None    Immunizations:   Immunization History   Administered Date(s) Administered    Influenza High Dose Vaccine PF 10/10/2016    Pneumococcal Polysaccharide (PPSV-23) 10/10/2016    Pneumococcal Vaccine (Pcv) 10/31/2007    Tdap 04/21/2014    Zoster Vaccine, Live 10/10/2016       Active problems include:    Patient Active Problem List   Diagnosis Code    DJD (degenerative joint disease) M19.90    Osteopenia of multiple sites M85.89    Pericardial effusion I31.3    Seropositive rheumatoid arthritis ([facility] [address] M05.9    Primary osteoarthritis of both hands M19.041, M19.042    Primary osteoarthritis of both knees M17.0    Trochanteric bursitis of both hips M70.61, M70.62    Pulmonary nodules R91.8    Abnormal CT scan, chest R93.8    Long-term use of immunosuppressant medication Z79.899    ILD (interstitial lung disease) ([facility]) J84.9       HISTORY OF PRESENT ILLNESS    Ms. Felicia Dsouza returns for a follow-up visit. On her last visit, I saw her for persistent right shoulder pain which I suspected was radiculopathy. I ordered an MRI which showed stenosis and my nurse called her to refer her to [facility] several times but without success.  I also prescribed her decadron without relief. She did physical therapy with some relief. I continued leflunomide and Enbrel. Today, she complains of aching and throbbing pain from her right shoulder and now in her left arm. She denies pain, swelling, or stiffness in her hands. She exercises her hands in the morning, day, and evening. Last blood work was done on 2/14/2018 and did not reveal any significant adverse effects, except Hct 49.2%, creatinine 1.07 mg/dL and eGFR 59. Most recent inflammatory markers from 2/14/2018 revealed a ESR 17 mm/hr (previously 2, 20, 9, 4, 3, 10 mm/hr) and CRP 0.5 mg/L (previously 2.0, 0.9, 1.0, 0.8, 1.3, 0.7 mg/dL). The patient has not had any interval hospital admissions, infections, or surgeries. REVIEW OF SYSTEMS    A comprehensive review of systems was performed and pertinent results are documented in the HPI, review of systems is otherwise non-contributory. PAST MEDICAL HISTORY    She has a past medical history of Carpal tunnel syndrome; DJD (degenerative joint disease); DJD (degenerative joint disease) of knees (4/9/2013); Eczema; GERD (gastroesophageal reflux disease); ILD (interstitial lung disease) ([facility] [address] (6/14/2017); Osteopenia of multiple sites; Post-menopausal; Primary osteoarthritis of both hands (5/27/2016); Primary osteoarthritis of both knees (5/27/2016); Pulmonary nodules (6/15/2016); and Seropositive rheumatoid arthritis ([facility] [address] (5/27/2016). FAMILY HISTORY    Her family history includes Arthritis-osteo in her brother, father, sister, and sister; Elevated Lipids in her sister; Heart Disease (age of onset: 72) in her mother; Hypertension in her brother, maternal aunt, and maternal uncle; Stroke in her maternal aunt and maternal uncle; Thyroid Disease in her sister. SOCIAL HISTORY    She reports that she quit smoking about 4 years ago. Her smoking use included Cigarettes. She smoked 0.10 packs per day.  She has never used smokeless tobacco. She reports that she does not drink alcohol or use illicit drugs. MEDICATIONS    Current Outpatient Prescriptions   Medication Sig Dispense Refill    multivitamin (ONE A DAY) tablet Take 1 Tab by mouth daily.  glucosamine-chondroitin (ARTHX) 500-400 mg cap Take 1 Cap by mouth daily.  omeprazole (PRILOSEC) 40 mg capsule TAKE ONE CAPSULE BY MOUTH EVERY DAY 90 Cap 0    hydroCHLOROthiazide (HYDRODIURIL) 25 mg tablet Take 1 Tab by mouth daily. Takes 1 tab daily. 90 Tab 1    etanercept (ENBREL SURECLICK) 50 mg/mL (0.02 mL) injection 0.98 mL by SubCUTAneous route every seven (7) days. 3.98 mL 6    vitamin E (AQUA GEMS) 400 unit capsule Take 400 Units by mouth daily.  OTHER Take 1,200 mg by mouth daily. Indications: Calcium and Vitamin D3 1200 mg total      ascorbic acid, vitamin C, (VITAMIN C) 500 mg tablet Take 500 mg by mouth daily. Takes 1 tab daily. ALLERGIES    No Known Allergies    PHYSICAL EXAMINATION    Visit Vitals    /80    Pulse 84    Temp 98.2 °F (36.8 °C)    Resp 18    Ht 5' 3\" (1.6 m)    Wt 163 lb (73.9 kg)    BMI 28.87 kg/m2     Body mass index is 28.87 kg/(m^2). General: Patient is alert, oriented x 3, not in acute distress    HEENT:   Sclerae are not injected and appear moist.  Oral mucous membranes are moist, there are no ulcers present. There is no alopecia. Neck is supple     Cardiovascular:  Heart is regular rate and rhythm, no murmurs. Chest:  Lungs are clear to auscultation bilaterally. No rhonchi, wheezes, or crackles. Extremities:  Free of clubbing, cyanosis, edema    Neurological exam:  No focal sensory deficits, muscle strength is full in upper and lower extremities     Skin exam:  There are no rashes, no alopecia, no discoid lesions, no active Raynaud's, no livedo reticularis, no periungual erythema.     Musculoskeletal exam:  A comprehensive musculoskeletal exam was performed for all joints of each upper and lower extremity and assessed for swelling, tenderness and range of motion.  Positive results are documented as below:     Decreased ROM of right shoulder due to pain  Bilateral Michael and Heberden nodes  Bilateral knee crepitus without effusion but active synovitis (left more than right) (RESOLVED)  Bilateral hallux valgus  Hammer toes  Left ankle swelling and pain (RESOLVED)    Joint Count 5/23/2018 2/14/2018 11/14/2017 9/14/2017 6/14/2017 3/14/2017 2/14/2017   Patient pain (0-100) - 0 5 (No Data) 0 0 0   MHAQ 0.75 0 0.5 0 0 0 0   Left shoulder - Tender - 1 - - - - -   Left elbow - Tender - - 1 1 - - -   Left elbow - Swollen - - 1 - - - -   Left wrist- Tender 0 - - - 0 0 0   Left wrist- Swollen 1 1 1 1 1 1 1   Left 1st MCP - Tender - - 1 1 - - -   Left 1st MCP - Swollen 1 1 1 1 1 - -   Left 2nd MCP - Tender - - 1 - - - -   Left 2nd MCP - Swollen - 1 1 1 1 - -   Left 3rd MCP - Tender - - 1 - - - -   Left 3rd MCP - Swollen - 1 1 - 1 - -   Left 4th MCP - Swollen - 1 - - - - -   Left 5th MCP - Tender - - 1 - - - -   Left 5th MCP - Swollen - - 1 - 1 - -   Left thumb IP - Tender - - 1 - - - -   Left thumb IP - Swollen - - - - 1 - -   Left 2nd PIP - Tender - 1 1 - - - -   Left 2nd PIP - Swollen - 1 1 - 1 - -   Left 3rd PIP - Tender - 1 1 - - - -   Left 3rd PIP - Swollen - 1 1 - 1 1 -   Left knee - Tender - - 1 1 - - -   Left knee - Swollen - - 1 1 - - -   Right shoulder - Tender - 1 1 - - - -   Right elbow - Tender - - 1 1 - - -   Right elbow - Swollen - - 1 - - - -   Right wrist- Tender - - 1 - - - -   Right wrist- Swollen 1 1 1 1 1 - 1   Right 1st MCP - Tender - 1 1 1 - - -   Right 1st MCP - Swollen - 1 1 1 1 - 1   Right 2nd MCP - Tender - - 1 - - - -   Right 2nd MCP - Swollen - - 1 - 1 - -   Right 3rd MCP - Tender - - 1 - - - -   Right 3rd MCP - Swollen 1 1 - - 1 - -   Right 4th MCP - Swollen 1 1 - - - - -   Right 5th MCP - Tender - - 1 - - - -   Right 5th MCP - Swollen - - 1 - 1 - 1   Right thumb IP - Tender - - 1 - - - -   Right 2nd PIP - Tender - 1 1 1 - - -   Right 2nd PIP - Swollen 1 1 1 1 1 - 1   Right 3rd PIP - Tender - - 1 - - - -   Right 3rd PIP - Swollen - 1 1 1 1 1 -   Right 4th PIP - Tender - - - - - - -   Right 4th PIP - Swollen - - 1 - - - -   Right 5th PIP - Tender - - 1 - - - -   Right knee - Tender - - 1 - - - -   Right knee - Swollen - - - 1 - - -   Tender Joint Count (Total) 0 6 21 6 0 0 0   Swollen Joint Count (Total) 6 13 17 9 15 3 5   Physician Assessment (0-10) 2 4 4 3 2 1 2   Patient Assessment (0-10) 0.5 0 0.5 (No Data) 0 0.5 0.5   CDAI Total (calculated) 8.5 23 42.5 - 17 4.5 7.5     DATA REVIEW    Laboratory     Recent laboratory results were reviewed, summarized, and discussed with the patient. Imaging    Musculoskeletal Ultrasound    Ultrasound of the right wrist. Indication: joint swelling. (5/27/2016)  Using a [employer] e with 18 Mhz probe, limited views of the right wrist were obtained. This revealed hypoechoic collection without doppler within the midcarpal joint space. The tendons were normal. Bony contours were regular without erosions seen. There were no soft tissue masses noted. Impression: synovial hypertrophy    Radiographs    Cervical Spine 4/24/2018: There is no acute fracture or dislocation, severe disc degeneration and spondylosis seen from C2 to C6. Milder changes seen at C6-C7. Foraminal narrowing is seen bilaterally at C4-5 and 5 6. Bilateral Hand 5/27/2016: Moderate bilateral triscaphe joint osteoarthritis. Moderate left and mild right first MC joint osteoarthritis. Moderate bilateral first MCP joint osteoarthritis. Multifocal IP joint osteoarthritis is bilateral. There are central erosions in the right first through fourth DIP joints and left third and fifth MP joints. Subtle marginal erosions in the left second DIP joint. No fracture or dislocation on plain film. Alignment is within normal limits. Bone mineralization is decreased. No soft tissue calcification.     Bilateral Shoulder 5/27/2016: LEFT: demonstrate mild osteopenia. No acute fracture or dislocation. Age-appropriate AC joint osteoarthritis with marginal osteophytes. Glenohumeral joint is within normal limits for age. No evidence of erosion. RIGHT: demonstrate osteopenia. No acute fracture or dislocation. AC joint osteoarthritis includes marginal osteophytes. Glenohumeral joint osteoarthritis is age-appropriate. Cortical irregularity of the greater tuberosity of the humerus indicates underlying rotator cuff pathology    Left Knee 12/29/2015: no fracture. Joint effusion and prepatellar soft tissue contusion versus bursitis. Increased osteoarthritis. Lumbar Spine 12/29/2015: stable dextroconvex scoliosis without evidence of acute fracture or subluxation. Significant multilevel degenerative disc disease is again noted from L2-S1. The patient is status post laminectomy from L1 through to the sacrum. Multilevel facet degenerative arthritis again noted. Left Knee 8/03/2010: sclerosis and deformity of the lateral tibial plateau with associated lateral compartment osteoarthritis is suggestive of old injury. No acute fracture is seen. There is moderate to severe patellofemoral     CT Imaging    CT Chest without contrast 6/20/2017: The style windows demonstrate no adenopathy. There continues to be a moderate pericardial effusion which is unchanged when compared with 7/22/2016. No thyroid nodules. No axillary adenopathy. No adrenal lesions. There is a small moderate hiatal hernia. Review of bone windows demonstrates no destructive lesions. Lung windows reveal a stable small solid nodule in the right middle lobe laterally. The ill-defined semisolid groundglass opacity anteriorly in left lower lobe is unchanged when compared with the CT of 3/12/2015. This should be evaluated on continue follow-up. Follow-up exam in one year is suggested. No new nodules are noted. The high-resolution images demonstrate mild changes of centrilobular emphysema.  There is no evidence for fibrosis or bronchiectasis. No significant interstitial lung disease. CT Chest without contrast 7/22/2016: Small lingular and right middle lobe nodules and left lower lobe groundglass opacity all unchanged. This represents a 16 month follow-up examination. The small middle lobe nodule and groundglass opacity in the left lower lobe are essentially unchanged in comparison with 3/9/2011. CT Chest with contrast 1/27/2016: nodules in both lungs including a soft more groundglass appearance nodule in the left lower lobe are stable when compared to the previous examination. No new acute finding or new nodule. Small pericardial effusion. Hiatal hernia. CT Chest without contrast 6/15/2015: stable semisolid nodule anteriorly in left lower lobe. The other nodules are stable. Decreasing pericardial effusion. Stable adenopathy. She underwent a fine needle biopsy which showed abundant benign and reactive bronchial epithelial cells and no malignancy. CT Chest without contrast 5/13/2015: a suspicious semisolid nodule in the anterior segment of the left lower lobe just posterior to the fissure with some retraction of the fissure. This measures 1.2 x 1 x 1.4 cm. PET/CT negative for an 4/10/2015. Moderate stable pericardial effusion. Stable enlarged lymph nodes as described. Several other small pulmonary nodules also stable. PET/CT Scan 4/10/2015: showed the vague semisolid nodule in the left lower lobe demonstrates no increased metabolic activity on PET suggesting benign etiology but is nonspecific and neoplasm is not entirely excluded. There are 2 lymph nodes in the right neck which are normal in size and demonstrate slight increased metabolic activity nonspecific most likely reactive. CT Heart without contrast with calcium 3/12/2015: total calcium score of 0.  A low score suggests a low likelihood of coronary disease but does not exclude the possibility of significant coronary artery narrowing. 3 mm right middle lobe lung nodule unchanged. Pericardial effusion. Hiatal hernia. A CT chest without contrast showed a suspicious semisolid nodule in the anterior segment of the left lower lobe just posterior to the fissure with some retraction of the fissure. This measures 1.2 x 1 x 1.4 cm. This could represent a small bronchogenic carcinoma. Moderate pericardial effusion clinical correlation is recommended. 2 enlarged lymph nodes as described. Several other small pulmonary nodules can be evaluated on followup 23X    CT Heart without contrast with calcium 3/09/2011: total calcium score of 0. A low score suggests a low likelihood of coronary disease but does not exclude the possibility of significant coronary artery narrowing. Small pericardial effusion. 3 mm pulmonary nodule right middle lobe. 7 mm density left lower lobe. MR Imaging    MRI Cervical Spine without contrast 5/07/2018: Congenital narrowing of the cervical canal. Loss of normal cervical lordosis. Vertebral body heights are maintained. Degenerative marrow signal changes. The craniocervical junction is intact. The course, caliber, and signal intensity of the spinal cord are normal. The paraspinal soft tissues are within normal limits. C2-C3:  Disc bulge/osteophyte. Facet arthropathy. Mild canal stenosis. Severe left foraminal stenosis. C3-C4:  Mild facet arthropathy. Disc bulge/osteophyte. Mild canal stenosis. Foramina are patent. C4-C5:  Disc bulge/ossify. Facet arthropathy. Moderate canal stenosis. Severe right and moderate left foraminal stenosis. C5-C6:  Disc bulge/osteophyte. Facet arthropathy. Mild to moderate canal stenosis. Foramina are patent. C6-C7:  Disc bulge/osteophyte. Mild facet arthropathy. Mild central canal stenosis. Foramina are patent. C7-T1:  Mild facet arthropathy. Disc bulge. Mild canal stenosis. Foramina are patent. Mild disc protrusion at T1-T2. Canal is patent. Mild right foraminal stenosis.  Small Schmorl's node along the inferior endplate of T2. Degenerative marrow signal changes are most pronounced at C5-C6. DXA    DXA  10/06/2017: (excluded None) showed lumbar spine L1-L4 T score 1.2 (BMD 1.324 g/cm2), left femoral neck T score: -2.3 (0.721 g/cm2), left total hip T score: -2.1 (0.748 g/cm2), right femoral neck T score: -2.1 (0.749g/cm2), right total hip T score: -1.4 (0.834 g/cm2), and distal one third left radius T score N/A (BMD N/A g/cm2). FRAX score N/A % probability in 10 years for major osteoporotic fracture and N/A % 10 year probability of hip fracture. DXA 5/21/2015: lumbar spine L1-L4 T score 1.7 (BMD 1.389 g/cm2), left femoral neck T score: -2.2 (0.728 g/cm2), right femoral neck  T score: -1.4 (0.825 g/cm2), total hip T score: -2.2 (0.728 g/cm2). Echocardiogram    Echocardiogram 6/03/2015: LEFT VENTRICLE: Size was normal. Systolic function was normal. Ejection fraction was estimated in the range of 60 % to 65 %. There were no regional wall motion abnormalities. Wall thickness was mildly increased. DOPPLER: Doppler parameters were consistent with abnormal left ventricular relaxation (grade 1 diastolic dysfunction). RIGHT VENTRICLE: The size was normal. Systolic function was normal. LEFT ATRIUM: Size was normal. LA volume index was 23 ml/m squared. ATRIAL SEPTUM: There was a small atrial septal aneurysm. There was no evidence of shunt on color flow imaging. RIGHT ATRIUM: Size was normal. MITRAL VALVE: Normal valve structure. DOPPLER: There was trivial regurgitation. AORTIC VALVE: Normal valve structure. DOPPLER: Transaortic velocity was within the normal range. There was no stenosis. There was no significant regurgitation. TRICUSPID VALVE: Normal valve structure. DOPPLER: There was mild regurgitation. Right atrial pressure estimate: 5 mmHg. Pulmonary artery systolic pressure: 25 mmHg. There was no pulmonary hypertension. PULMONIC VALVE: Not well visualized.  DOPPLER: The transpulmonic velocity was within the normal range. There was trivial regurgitation. AORTA: The root exhibited normal size. SYSTEMIC VEINS: IVC: The inferior vena cava was normal in size. PERICARDIUM: A small pericardial effusion was identified along the right atrial free wall. There was no evidence of hemodynamic compromise. PFT    PFT 6/20/2017: FVC of 1.78 (>80%), FEV1 of 1.31 (85%), FEV1/FVC of 73.82% and a DLCO of 19.65 (116%). PATHOLOGY    Fine Need Aspirate of LLL 6/15/2015: Abundant benign and reactive bronchial epithelial cells. No cells diagnostic for malignancy    PROCEDURE     Right Shoulder Kenalog 40 mg IA. (2/14/18)  Right Shoulder Kenalog 40 mg IA. (11/14/17)   Bilateral Trochanteric Bursitis Kenalog 40 mg IB. (2/14/17)   Bilateral Knee Kenalog 40 mg IA. (11/16/16)    ASSESSMENT AND PLAN    This is a follow-up visit for Ms. Danie Huber. 1) Seropositive Rheumatoid Arthritis with Interstitial Lung Disease. She is on Enbrel monotherapy. She stopped leflunomide after completing her tablets and has not felt worse. Her CDAI 8.5 (previously 23, 42.5, N/A 17, 4.5, 7.5, 8, 16, 15.5, 19), with 0 tender and 6swollen, consistent with low disease activity. She feels better so I will hold off from resuming lefluomide, but if she flares, I will resume it    2) Long Term Use of Immunosuppressants. The patient remains on immunomodulatory medications (Enbrel) and requires frequent toxicity monitoring by blood work. Respective labs were ordered (CBC and CMP). 3) Bilateral Hands and Knee Osteoarthritis. This was not an active issue today. 4) Bilateral Trochanteric Bursitis. This was not an active issue today. 5) Osteopenia. Her 10/06/2017 DXA showed left femoral neck T score: -2.3 (0.721 g/cm2). I will continue to monitor. 6) Pericardial Effusion. This is chronic and asymptomatic. 7) Pulmonary Nodule and Ground Glass Opacities.  The ill-defined semisolid groundglass opacity anteriorly in left lower lobe is unchanged when compared with the CT of 3/12/2015. CT Chest and PFTs did not show active disease or pulmonary compromise. 8) Chronic Lower Back Pain. She has a spinal cord stimulator. This was not an active issue today. 9) CKD Stage 2. Her creatinine was 1.05 mg/dL and eGFR 60 (previously 1.02, 0.88 mg/dL and eGFR 54, 75). I will repeat today. 10) Cervical Radiculopathy due to Neuroforaminal Stenosis. This is the source of her shoulder pain. I referred her to Dr. Hui Espinosa. She has done physical therapy with some improvement. Decardron did not help. The patient voiced understanding of the aforementioned assessment and plan. Summary of plan was provided in the After Visit Summary patient instructions.      TODAY'S ORDERS    Orders Placed This Encounter    REFERRAL TO ORTHOPEDICS     Future Appointments  Date Time Provider Franny Willis   6/20/2018 10:00 AM [facility] CT ER 2 [facility] . [facility]   8/23/2018 10:00 AM MD Ben Banks MD, [facility] [place]    Adult Rheumatology   Musculoskeletal Ultrasound Certified  [address] [place], [place] [address]   [address] [address]   Phone [phone]  Fax [phone]

## 2025-07-18 ENCOUNTER — HOSPITAL ENCOUNTER (EMERGENCY)
Facility: HOSPITAL | Age: 85
Discharge: HOME | End: 2025-07-18
Payer: MEDICARE

## 2025-07-18 VITALS
HEART RATE: 73 BPM | SYSTOLIC BLOOD PRESSURE: 128 MMHG | TEMPERATURE: 98.1 F | BODY MASS INDEX: 25.66 KG/M2 | OXYGEN SATURATION: 98 % | HEIGHT: 65 IN | WEIGHT: 154 LBS | RESPIRATION RATE: 16 BRPM | DIASTOLIC BLOOD PRESSURE: 102 MMHG

## 2025-07-18 DIAGNOSIS — H61.22 IMPACTED CERUMEN OF LEFT EAR: Primary | ICD-10-CM

## 2025-07-18 PROCEDURE — 99283 EMERGENCY DEPT VISIT LOW MDM: CPT

## 2025-07-18 PROCEDURE — 6370000000 HC RX 637 (ALT 250 FOR IP): Performed by: PHYSICIAN ASSISTANT

## 2025-07-18 PROCEDURE — 69210 REMOVE IMPACTED EAR WAX UNI: CPT

## 2025-07-18 RX ORDER — ASPIRIN 81 MG
5 TABLET, DELAYED RELEASE (ENTERIC COATED) ORAL 2 TIMES DAILY
Qty: 15 ML | Refills: 0 | Status: SHIPPED | OUTPATIENT
Start: 2025-07-18 | End: 2025-08-17

## 2025-07-18 RX ORDER — DOCUSATE SODIUM 100 MG/1
100 CAPSULE, LIQUID FILLED ORAL
Status: COMPLETED | OUTPATIENT
Start: 2025-07-18 | End: 2025-07-18

## 2025-07-18 RX ADMIN — DOCUSATE SODIUM 100 MG: 100 CAPSULE, LIQUID FILLED ORAL at 13:57

## 2025-07-18 RX ADMIN — DOCUSATE SODIUM 100 MG: 100 CAPSULE, LIQUID FILLED ORAL at 14:37

## 2025-07-18 ASSESSMENT — PAIN SCALES - GENERAL: PAINLEVEL_OUTOF10: 4

## 2025-07-18 ASSESSMENT — PAIN DESCRIPTION - LOCATION: LOCATION: EAR

## 2025-07-18 NOTE — ED TRIAGE NOTES
Pt with c/o left ear pain x 2 months, had her ear flushed and didn't provide any relief. Last night she put some drops of oil in her ear which helped a little bit

## 2025-07-18 NOTE — ED PROVIDER NOTES
[facility] EMERGENCY DEPT  EMERGENCY DEPARTMENT HISTORY AND PHYSICAL EXAM      Date of evaluation: 7/18/2025  Patient Name: Tanna Dumont  Birthdate 1940  MRN: [id number]  ED Provider: Merritt Tolentino PA-C   Note Started: 1:32 PM EDT 7/18/25    HISTORY OF PRESENT ILLNESS     Chief Complaint   Patient presents with    Ear Pain       History Provided By: Patient, only     HPI: Tanna Dumont is a 84 y.o. female with a past medical history as listed below who presents to this ED with cc of ear pain. Patient reports a 2 month history of left \"ear fullness and muffled hearing\".  Has been seen by ENT in the past for the same.  Notes that she has an appointment in August but does not want to wait that long.  No fever or chills.  Specifically denies any cough, congestion, sore throat, headaches, light headedness, dizziness, changes in bowel or bladder habits, rashes.  Denies treating symptoms with anything today.  Patient reports otherwise being well and has no further concerns.     PAST MEDICAL HISTORY   Past Medical History:  Past Medical History:   Diagnosis Date    Abdominal pain 9/10/2020    Anemia 6/22/2021    HGB 8.5   DR NATION    Antral gastritis     Arthritis     Arthritis     since age 16    Blood loss anemia 6/24/2021    Carpal tunnel syndrome     Diverticulosis 9/10/2020    DJD (degenerative joint disease)     hip/spine lumbar spine with spinal stenosis     DJD (degenerative joint disease) of knee 4/9/2013    Ortho, [facility] Dr Sanchez    Eczema     Esophagitis     GERD (gastroesophageal reflux disease)     GI bleed 6/24/2021    Hiatal hernia     HTN (hypertension) 9/10/2020    ILD (interstitial lung disease) (HCC) 6/14/2017    Osteopenia of multiple sites     Hip; 5/21/15     Post-menopausal     Prepyloric ulcer     Primary osteoarthritis of both hands 5/27/2016    Primary osteoarthritis of both knees 5/27/2016    Pulmonary nodules 6/15/2016    Right leg pain 9/10/2020    Seropositive rheumatoid arthritis  stable vitals and reassuring exam.  History exam consistent with cerumen impaction.  Will apply otic docusate and irrigate the affected ear with normal saline and a curette. Will also encourage patient to follow up with her ENT specialist as previously scheduled.      Vitals:    Vitals:    07/18/25 1325   BP: (!) 128/102   Pulse: 73   Resp: 16   Temp: 98.1 °F (36.7 °C)   SpO2: 98%   Weight: 69.9 kg (154 lb)   Height: 1.651 m (5' 5\")       ED COURSE  3:49 PM  Patient reassessed and updated on plan.  I am now able to visualize the patient's left TM, which is normal appearing.  Patient endorsing symptom resolution following ear irrigation. She has been encouraged to maintain follow up with her ENT as previously scheduled, or return to ED sooner if worse.  Patient also educated on strict return precautions and conveys good understanding and agreement with care plan as outlined.  They have no additional complaints or concerns and all of their questions have been answered.  Stable for discharge home.       Routine discharge counseling was given including the fact that any worsening, changing or persistent symptoms should prompt an immediate call or follow up with their primary physician or the emergency department. The importance of appropriate follow up was also discussed. More extensive discharge instructions were given in the patient's discharge paperwork.     After completion of evaluation and discussion of results and diagnoses, all the questions were answered. If required, all follow up appointments and treatments were discussed and explained. Understanding was insured prior to discharge.           Smoking Cessation: Not Applicable    Patient was given the following medications:  Medications   docusate sodium (COLACE) capsule 100 mg (100 mg Otic Given 7/18/25 1357)   docusate sodium (COLACE) capsule 100 mg (100 mg Otic Given 7/18/25 1437)       CONSULTS: See ED Course/MDM for further details.  None   PROCEDURES

## 2025-07-23 ENCOUNTER — OFFICE VISIT (OUTPATIENT)
Age: 85
End: 2025-07-23

## 2025-07-23 VITALS
SYSTOLIC BLOOD PRESSURE: 134 MMHG | BODY MASS INDEX: 25.16 KG/M2 | DIASTOLIC BLOOD PRESSURE: 80 MMHG | HEART RATE: 65 BPM | WEIGHT: 151 LBS | RESPIRATION RATE: 16 BRPM | TEMPERATURE: 97.5 F | OXYGEN SATURATION: 98 % | HEIGHT: 65 IN

## 2025-07-23 DIAGNOSIS — H61.23 IMPACTED CERUMEN, BILATERAL: Primary | ICD-10-CM

## 2025-07-23 ASSESSMENT — PATIENT HEALTH QUESTIONNAIRE - PHQ9
SUM OF ALL RESPONSES TO PHQ QUESTIONS 1-9: 0
2. FEELING DOWN, DEPRESSED OR HOPELESS: NOT AT ALL
SUM OF ALL RESPONSES TO PHQ QUESTIONS 1-9: 0
SUM OF ALL RESPONSES TO PHQ QUESTIONS 1-9: 0
1. LITTLE INTEREST OR PLEASURE IN DOING THINGS: NOT AT ALL
SUM OF ALL RESPONSES TO PHQ QUESTIONS 1-9: 0

## 2025-07-23 NOTE — PROGRESS NOTES
[facility] ENT & Allergy  New Patient Visit    Patient: Tanna Dumont  YOB: 1940  MRN: [id number]  Date of Service:  7/23/2025    Subjective:     Chief Complaint: clogged ear sensation    History of Present Illness:   Tanna Dumont is a 84 y.o. female who presents today for the evaluation of clogged ear sensation.  Patient notes history of cerumen impactions requiring in-office cerumen removal  Recently went to ER on 7/18 for left ear fullness and muffled hearing for the past 2 months  In the ER, cerumen was partially removed via irrigation and curette  Patient continues to experience some fullness in left ear as well as the right ear  Denies otalgia, otorrhea, tinnitus, vertigo.  No history of ear surgeries, hearing loss, or recurrent ear infections.    Review of Systems:  Consitutional: positive for fatigue. denies fever, excessive weight gain or loss.  Eyes: denies diplopia, eye pain.  Integumentary: denies new concerning skin lesions.  Ears, Nose, Mouth, Throat: denies except as per HPI.  Endocrine: denies hot or cold intolerance, increased thirst.  Respiratory: denies cough, hemoptysis, wheezing  Gastrointestinal: denies trouble swallowing, nausea, emesis, regurgitation  Musculoskeletal: denies muscle weakness or wasting  Cardiovascular: denies chest pain, shortness of breath  Neurologic: denies seizures, numbness or tingling, syncope  Hematologic: denies easy bleeding or bruising     Past Medical History:  Past Medical History:   Diagnosis Date    Abdominal pain 9/10/2020    Anemia 6/22/2021    HGB 8.5   DR NATION    Antral gastritis     Arthritis     Arthritis     since age 16    Blood loss anemia 6/24/2021    Carpal tunnel syndrome     Diverticulosis 9/10/2020    DJD (degenerative joint disease)     hip/spine lumbar spine with spinal stenosis     DJD (degenerative joint disease) of knee 4/9/2013    Ortho, [facility] Dr Sanchez    Eczema     Esophagitis     GERD (gastroesophageal reflux

## 2025-07-23 NOTE — PROGRESS NOTES
1. Have you been to the ER, urgent care clinic since your last visit?  Hospitalized since your last visit?Yes When: 7/18/2025 Where: [place] Reason for visit: ear pain    2. Have you seen or consulted any other health care providers outside of the [facility] since your last visit?  Include any pap smears or colon screening. No    Chief Complaint   Patient presents with    New Patient     Left Ear wax blockage  Treatment with Debrox. Patient not sure if medication helping.     Health Maintenance Due   Topic Date Due    Shingles vaccine (2 of 3) 01/28/2020    COVID-19 Vaccine (5 - 2024-25 season) 09/01/2024    Depression Screen  12/13/2024    Annual Wellness Visit (Medicare Advantage)  Never done     PHQ-9 Total Score: 0 (7/23/2025  9:42 AM)        7/23/2025     9:00 AM   AMB Abuse Screening   Do you ever feel afraid of your partner? N   Are you in a relationship with someone who physically or mentally threatens you? N   Is it safe for you to go home? Y     Vitals:    07/23/25 0941   BP: 134/80   Pulse: 65   Resp: 16   Temp: 97.5 °F (36.4 °C)   SpO2: 98%

## 2025-07-30 ENCOUNTER — OFFICE VISIT (OUTPATIENT)
Age: 85
End: 2025-07-30

## 2025-07-30 VITALS
DIASTOLIC BLOOD PRESSURE: 72 MMHG | WEIGHT: 152 LBS | BODY MASS INDEX: 25.33 KG/M2 | HEART RATE: 78 BPM | RESPIRATION RATE: 16 BRPM | OXYGEN SATURATION: 95 % | HEIGHT: 65 IN | SYSTOLIC BLOOD PRESSURE: 134 MMHG

## 2025-07-30 DIAGNOSIS — H61.22 IMPACTED CERUMEN, LEFT EAR: Primary | ICD-10-CM

## 2025-07-30 NOTE — PROGRESS NOTES
[facility] ENT & Allergy  Follow-up Visit    Patient: Tanna Dumont  YOB: 1940  MRN: [id number]  Date of Service:  7/30/2025    Subjective:     Chief Complaint: Left ear fullness    History of Present Illness:     Initial HPI: 7/23/2025   Tanna Dumont is a 84 y.o. female who presents today for the evaluation of clogged ear sensation.  Patient notes history of cerumen impactions requiring in-office cerumen removal  Recently went to ER on 7/18 for left ear fullness and muffled hearing for the past 2 months  In the ER, cerumen was partially removed via irrigation and curette  Patient continues to experience some fullness in left ear as well as the right ear  Denies otalgia, otorrhea, tinnitus, vertigo.  No history of ear surgeries, hearing loss, or recurrent ear infections.     Interval History:  7/30/2025  States she is doing well overall today.  Has been using Debrox drops in the left ear for the past 5 days.  Continues to have left ear fullness and muffled hearing, which she attributes to earwax.  Denies otalgia, otorrhea, tinnitus, vertigo.  Denies any issues with the left ear, or any other concerns.      Review of Systems:  Consitutional: denies fever, excessive weight gain or loss.  Eyes: denies diplopia, eye pain.  Integumentary: denies new concerning skin lesions.  Ears, Nose, Mouth, Throat: denies except as per HPI.  Endocrine: denies hot or cold intolerance, increased thirst.  Respiratory: denies cough, hemoptysis, wheezing  Gastrointestinal: denies trouble swallowing, nausea, emesis, regurgitation  Musculoskeletal: denies muscle weakness or wasting  Cardiovascular: denies chest pain, shortness of breath  Neurologic: denies seizures, numbness or tingling, syncope  Hematologic: denies easy bleeding or bruising     Past Medical History:  Past Medical History:   Diagnosis Date    Abdominal pain 9/10/2020    Anemia 6/22/2021    HGB 8.5   DR NATION    Antral gastritis     Arthritis

## 2025-08-06 ENCOUNTER — OFFICE VISIT (OUTPATIENT)
Age: 85
End: 2025-08-06

## 2025-08-06 VITALS
RESPIRATION RATE: 18 BRPM | HEIGHT: 65 IN | BODY MASS INDEX: 25.33 KG/M2 | WEIGHT: 152 LBS | SYSTOLIC BLOOD PRESSURE: 118 MMHG | DIASTOLIC BLOOD PRESSURE: 72 MMHG | OXYGEN SATURATION: 98 % | HEART RATE: 68 BPM

## 2025-08-06 DIAGNOSIS — H61.22 IMPACTED CERUMEN, LEFT EAR: Primary | ICD-10-CM

## 2025-08-06 DIAGNOSIS — H91.90 HEARING LOSS, UNSPECIFIED HEARING LOSS TYPE, UNSPECIFIED LATERALITY: ICD-10-CM

## (undated) DEVICE — BITE BLK ENDOSCP AD 54FR GRN POLYETH ENDOSCP W STRP SLD

## (undated) DEVICE — JELLY,LUBE,STERILE,FLIP TOP,TUBE,4-OZ: Brand: MEDLINE

## (undated) DEVICE — WASH CLOTH INCONT LO LINT PREM 12X13 IN LF DISP

## (undated) DEVICE — SYR 5ML 1/5 GRAD LL NSAF LF --

## (undated) DEVICE — TUBING, SUCTION, 9/32" X 10', STRAIGHT: Brand: MEDLINE

## (undated) DEVICE — SOL IRRIGATION INJ NACL 0.9% 500ML BTL

## (undated) DEVICE — SINGLE USE BIOPSY VALVE MAJ-210: Brand: SINGLE USE BIOPSY VALVE (STERILE)

## (undated) DEVICE — CATHETER IV 20GA L1IN FEP STR HUB INTROCAN SFTY

## (undated) DEVICE — BAG SPEC BIOHZRD 10 X 10 IN --

## (undated) DEVICE — CATH SUC CTRL PRT TRIFLO 14FR --

## (undated) DEVICE — SINGLE-USE BIOPSY FORCEPS: Brand: RADIAL JAW 4

## (undated) DEVICE — FCPS RAD JAW 4 SC 240CM W/NDL --

## (undated) DEVICE — TRAP SUC MUCOUS 70ML -- MEDICHOICE MEDLINE

## (undated) DEVICE — STERILE POLYISOPRENE POWDER-FREE SURGICAL GLOVES: Brand: PROTEXIS

## (undated) DEVICE — SINGLE USE SUCTION VALVE MAJ-209: Brand: SINGLE USE SUCTION VALVE (STERILE)

## (undated) DEVICE — SYRINGE MED 10CC ECC TIP W/O NDL

## (undated) DEVICE — SOLUTION IRRIG 1000ML STRL H2O USP PLAS POUR BTL

## (undated) DEVICE — SYRINGE MED 20ML STD CLR PLAS LUERSLIP TIP N CTRL DISP

## (undated) DEVICE — PREP SKN CHLRAPRP SNGL 1.75ML --

## (undated) DEVICE — PAD,PREPPING,CUFFED,24X48,7",NONSTERILE: Brand: MEDLINE

## (undated) DEVICE — AIRLIFE™ ADULT CUSHION NASAL CANNULA 14 FOOT (4.3) CRUSH-RESISTANT OXYGEN TUBING, AND U/CONNECT-IT ADAPTER: Brand: AIRLIFE™

## (undated) DEVICE — SYR 3ML LL TIP 1/10ML GRAD --

## (undated) DEVICE — NDL FLTR TIP 5 MIC 18GX1.5IN --

## (undated) DEVICE — QUILTED PREMIUM COMFORT UNDERPAD,EXTRA HEAVY: Brand: WINGS

## (undated) DEVICE — KIT COLON W/ 1.1OZ LUB AND 2 END

## (undated) DEVICE — 1200 GUARD II KIT W/5MM TUBE W/O VAC TUBE: Brand: GUARDIAN

## (undated) DEVICE — KENDALL RADIOLUCENT FOAM MONITORING ELECTRODE -RECTANGULAR SHAPE: Brand: KENDALL

## (undated) DEVICE — SOL IRR STRL H2O 1000ML BTL --

## (undated) DEVICE — 1200CC GUARDIAN II: Brand: GUARDIAN

## (undated) DEVICE — BASIN SPNG 32OZ BLU STRL --

## (undated) DEVICE — 3M™ CUROS™ DISINFECTING CAP FOR NEEDLELESS CONNECTORS 270/CARTON 20 CARTONS/CASE CFF1-270: Brand: CUROS™

## (undated) DEVICE — NEONATAL-ADULT SPO2 SENSOR: Brand: NELLCOR

## (undated) DEVICE — SOLIDIFIER FLUID 3000 CC ABSORB

## (undated) DEVICE — KIT IV STRT W CHLORAPREP PD 1ML

## (undated) DEVICE — SPONGE GZ W4XL4IN COT 12 PLY TYP VII WVN C FLD DSGN

## (undated) DEVICE — MEDI-VAC NON-CONDUCTIVE SUCTION TUBING: Brand: CARDINAL HEALTH

## (undated) DEVICE — SET ADMIN 16ML TBNG L100IN 2 Y INJ SITE IV PIGGY BK DISP

## (undated) DEVICE — Z CONVERTED USE 2274305 CUFF BLD PRSS AD L SZ 12 FOR 32-43CM LIMB VLY SFT W/O TUBE

## (undated) DEVICE — SYR LR LCK 1ML GRAD NSAF 30ML --